# Patient Record
Sex: FEMALE | Race: WHITE | NOT HISPANIC OR LATINO | Employment: PART TIME | URBAN - METROPOLITAN AREA
[De-identification: names, ages, dates, MRNs, and addresses within clinical notes are randomized per-mention and may not be internally consistent; named-entity substitution may affect disease eponyms.]

---

## 2017-01-11 ENCOUNTER — ALLIED HEALTH/NURSE VISIT (OUTPATIENT)
Dept: NURSING | Facility: CLINIC | Age: 27
End: 2017-01-11
Payer: COMMERCIAL

## 2017-01-11 VITALS — HEART RATE: 72 BPM | HEIGHT: 67 IN | SYSTOLIC BLOOD PRESSURE: 114 MMHG | DIASTOLIC BLOOD PRESSURE: 68 MMHG

## 2017-01-11 DIAGNOSIS — M54.50 ACUTE MIDLINE LOW BACK PAIN WITHOUT SCIATICA: Primary | ICD-10-CM

## 2017-01-11 DIAGNOSIS — G89.4 CHRONIC PAIN SYNDROME: ICD-10-CM

## 2017-01-11 DIAGNOSIS — Z30.9 CONTRACEPTIVE MANAGEMENT: Primary | ICD-10-CM

## 2017-01-11 LAB — HCG, QUAL URINE: NEGATIVE

## 2017-01-11 PROCEDURE — 96372 THER/PROPH/DIAG INJ SC/IM: CPT

## 2017-01-11 PROCEDURE — 84703 CHORIONIC GONADOTROPIN ASSAY: CPT

## 2017-01-11 NOTE — PROGRESS NOTES
Follow Up Injection    Patient returning during stated date range given at previous visit: No, urine pregnancy test performed, results (neg - injection administered, positive - injection deferred)      If here at the correct interval:   BP Readings from Last 1 Encounters:   01/11/17 114/68     Wt Readings from Last 1 Encounters:   11/08/16 259 lb (117.482 kg)       Last Pap/exam date: 8/2014      Side effects or problems with last injection?  No.  Date range is given to patient for next dose: 3/29/17-4/12/17      See Medication Note for administration information    Staff Sig: Angela Cao MA

## 2017-01-11 NOTE — TELEPHONE ENCOUNTER
Reason for Call:  Medication or medication refill:    Do you use a [facility]?  Name of the pharmacy and phone number for the current request:  PT  [facility]    Name of the medication requested: oxyCODONE-acetaminophen (PERCOCET) 5-325 MG per tablet    Other request: none    Can we leave a detailed message on this number? YES    Phone number patient can be reached at: Home number on file [phone] (home)    Best Time: any    Call taken on 1/11/2017 at 2:13 PM by PAULA CHO

## 2017-01-12 NOTE — TELEPHONE ENCOUNTER
Controlled Substance Refill Request for oxyCODONE-acetaminophen (PERCOCET) 5-325 MG per tablet  Problem List Complete:  No     PROVIDER TO CONSIDER COMPLETION OF PROBLEM LIST AND OVERVIEW/CONTROLLED SUBSTANCE AGREEMENT    Last Written Prescription Date:  12/22/2016  Last Fill Quantity: 60,   # refills: 0    Last Office Visit with [facility] [place]: 11/08/2016    Future Office visit:     Controlled substance agreement on file: Yes:  Date 10/10/2016.     Processing:  Patient will  in clinic   checked in past 6 months?  Yes 08/30/2016

## 2017-01-19 RX ORDER — OXYCODONE AND ACETAMINOPHEN 5; 325 MG/1; MG/1
TABLET ORAL
Qty: 60 TABLET | Refills: 0 | OUTPATIENT
Start: 2017-01-19

## 2017-02-07 ENCOUNTER — OFFICE VISIT (OUTPATIENT)
Dept: FAMILY MEDICINE | Facility: CLINIC | Age: 27
End: 2017-02-07
Payer: COMMERCIAL

## 2017-02-07 VITALS
OXYGEN SATURATION: 98 % | DIASTOLIC BLOOD PRESSURE: 80 MMHG | BODY MASS INDEX: 40.09 KG/M2 | TEMPERATURE: 97.9 F | WEIGHT: 256 LBS | SYSTOLIC BLOOD PRESSURE: 120 MMHG | RESPIRATION RATE: 16 BRPM | HEART RATE: 91 BPM

## 2017-02-07 DIAGNOSIS — G89.4 CHRONIC PAIN SYNDROME: ICD-10-CM

## 2017-02-07 DIAGNOSIS — G89.29 CHRONIC MIDLINE LOW BACK PAIN WITHOUT SCIATICA: Primary | ICD-10-CM

## 2017-02-07 DIAGNOSIS — M54.50 ACUTE MIDLINE LOW BACK PAIN WITHOUT SCIATICA: ICD-10-CM

## 2017-02-07 DIAGNOSIS — M54.50 CHRONIC MIDLINE LOW BACK PAIN WITHOUT SCIATICA: Primary | ICD-10-CM

## 2017-02-07 PROCEDURE — 80307 DRUG TEST PRSMV CHEM ANLYZR: CPT | Mod: 90 | Performed by: FAMILY MEDICINE

## 2017-02-07 PROCEDURE — 99213 OFFICE O/P EST LOW 20 MIN: CPT | Performed by: FAMILY MEDICINE

## 2017-02-07 PROCEDURE — 99000 SPECIMEN HANDLING OFFICE-LAB: CPT | Performed by: FAMILY MEDICINE

## 2017-02-07 RX ORDER — OXYCODONE AND ACETAMINOPHEN 5; 325 MG/1; MG/1
TABLET ORAL
Qty: 60 TABLET | Refills: 0 | Status: SHIPPED | OUTPATIENT
Start: 2017-02-07 | End: 2017-02-22

## 2017-02-07 NOTE — PROGRESS NOTES
SUBJECTIVE:                                                    Erica Gordon is a 26 year old female who presents to clinic today for the following health issues:    Health Maintenance Due   Topic Date Due     URINE DRUG SCREEN Q1 YR  08/09/2003     INFLUENZA VACCINE (SYSTEM ASSIGNED)  09/01/2016     ASTHMA CONTROL TEST Q6 MOS (NO INBASKET)  01/14/2017     Health Maintenance reviewed at today's visit patient asked to schedule/complete:   Asthma:  Completed at today's visit.  chronic pain        Medication Followup of pain meds    Taking Medication as prescribed: yes    Side Effects:  None    Medication Helping Symptoms:  Yes    Pt has an appointment in April at pain clinic.  This was the earliest she could get in.       PROBLEMS TO ADD ON...    Problem list and histories reviewed & adjusted, as indicated.  Additional history: as documented  She is here with her beautiful baby girl, almost age 1.    Cannot   Get into pain clinic until April. She  Was in pt and does the exercises. Is now working part time at holiday. Is supposed to be 28 hours a week, but some times will work 50 hours a week.      Patient Active Problem List   Diagnosis     Fibromyalgia     Depression     CARDIOVASCULAR SCREENING; LDL GOAL LESS THAN 160     Low back pain     Obesity     GERD (gastroesophageal reflux disease)     Health Care Home     Mild persistent asthma     Encounter for routine gynecological examination     Back pain     Blood type A+     Encounter for triage in pregnant patient     Labor and delivery indication for care or intervention     Chronic pain syndrome     Past Surgical History   Procedure Laterality Date     Pe tubes  1993       Social History   Substance Use Topics     Smoking status: Current Every Day Smoker -- 0.25 packs/day for 4 years     Types: Cigarettes     Smokeless tobacco: Never Used      Comment: 1/2 ppd for 13 years     Alcohol Use: No      Comment: occasionally     Family History   Problem Relation Age  of Onset     Alcohol/Drug Mother      Depression Mother      Hypertension Mother      Depression Maternal Grandmother      Respiratory Other      Mat. cousin- cystic fibrosis     Unknown/Adopted Father      Unknown/Adopted Paternal Grandmother      Unknown/Adopted Paternal Grandfather            ROS:  CONSTITUTIONAL:NEGATIVE for fever, chills, change in weight  RESP:NEGATIVE for significant cough or SOB  CV: NEGATIVE for chest pain, palpitations or peripheral edema  MUSCULOSKELETAL: back pain; medicine does ot see mto relieve plain completleyl. Discussed stretches and rom exercises.   NEURO: NEGATIVE for weakness, dizziness or paresthesias    OBJECTIVE:                                                    /80 mmHg  Pulse 91  Temp(Src) 97.9  F (36.6  C)  Resp 16  Wt 256 lb (116.121 kg)  SpO2 98%  Body mass index is 40.09 kg/(m^2).  GENERAL APPEARANCE: healthy, alert and moderate distress  EYES: Eyes grossly normal to inspection, PERRL and conjunctivae and sclerae normal  MS: extremities normal- no gross deformities noted and decreased range of motion of lower back.   SKIN: no suspicious lesions or rashes  Psych-    Dysphoric.     Diagnostic test results:  Diagnostic Test Results:  Do the urine drug test.      ASSESSMENT/PLAN:                                                    1. Chronic midline low back pain without sciatica    - Drug Screen Comprehensive , Urine with Reported Meds (Pain Care Package)    2. Acute midline low back pain without sciatica    - oxyCODONE-acetaminophen (PERCOCET) 5-325 MG per tablet; one tab three times a day.  Dispense: 60 tablet; Refill: 0    3. Chronic pain syndrome    - oxyCODONE-acetaminophen (PERCOCET) 5-325 MG per tablet; one tab three times a day.  Dispense: 60 tablet; Refill: 0      discussed doing stretches before and after work. Discussed get urine test today congratualated on her child.   Follow up with Provider - 2-4 weeks do stretches.      Omar Izaguirre,  MD  [facility]

## 2017-02-07 NOTE — NURSING NOTE
"Chief Complaint   Patient presents with     Recheck Medication       Initial /80 mmHg  Pulse 91  Temp(Src) 97.9  F (36.6  C)  Resp 16  Wt 256 lb (116.121 kg)  SpO2 98% Estimated body mass index is 40.09 kg/(m^2) as calculated from the following:    Height as of 1/11/17: 5' 7\" (1.702 m).    Weight as of this encounter: 256 lb (116.121 kg).  Medication Reconciliation: complete   NISHA Cabrera CMA      "

## 2017-02-07 NOTE — MR AVS SNAPSHOT
"              After Visit Summary   2017    Erica Gordon    MRN: [id number]           Patient Information     Date Of Birth          1990        Visit Information        Provider Department      2017 11:15 AM Omar Izaguirre MD [facility]        Today's Diagnoses     Chronic midline low back pain without sciatica    -  1     Acute midline low back pain without sciatica         Chronic pain syndrome            Follow-ups after your visit        Who to contact     If you have questions or need follow up information about today's clinic visit or your schedule please contact [facility] directly at [phone].  Normal or non-critical lab and imaging results will be communicated to you by MyChart, letter or phone within 4 business days after the clinic has received the results. If you do not hear from us within 7 days, please contact the clinic through [facility] or phone. If you have a critical or abnormal lab result, we will notify you by phone as soon as possible.  Submit refill requests through [employer] or call your pharmacy and they will forward the refill request to us. Please allow 3 business days for your refill to be completed.          Additional Information About Your Visit        MyChart Information     [employer] lets you send messages to your doctor, view your test results, renew your prescriptions, schedule appointments and more. To sign up, go to [email or web address] . Click on \"Log in\" on the left side of the screen, which will take you to the Welcome page. Then click on \"Sign up Now\" on the right side of the page.     You will be asked to enter the access code listed below, as well as some personal information. Please follow the directions to create your username and password.     Your access code is: [email or web address]  Expires: 2017  4:58 PM     Your access code will  in 90 days. If you need help or a new " code, please call your [facility] clinic or [phone].        Care EveryWhere ID     This is your Care EveryWhere ID. This could be used by other organizations to access your [facility] medical records  [id number]        Your Vitals Were     Pulse Temperature Respirations Pulse Oximetry          91 97.9  F (36.6  C) 16 98%         Blood Pressure from Last 3 Encounters:   02/07/17 120/80   01/11/17 114/68   11/08/16 120/70    Weight from Last 3 Encounters:   02/07/17 256 lb (116.121 kg)   11/08/16 259 lb (117.482 kg)   10/10/16 265 lb (120.203 kg)              We Performed the Following     Drug Screen Comprehensive , Urine with Reported Meds (Pain Care Package)          Where to get your medicines      Some of these will need a paper prescription and others can be bought over the counter.  Ask your nurse if you have questions.     Bring a paper prescription for each of these medications    - oxyCODONE-acetaminophen 5-325 MG per tablet       Primary Care Provider Office Phone # Fax #    Omar Izaguirre [phone]       [facility] [address] [facility][1] [id number]        Thank you!     Thank you for choosing [facility]  for your care. Our goal is always to provide you with excellent care. Hearing back from our patients is one way we can continue to improve our services. Please take a few minutes to complete the written survey that you may receive in the mail after your visit with us. Thank you!             Your Updated Medication List - Protect others around you: Learn how to safely use, store and throw away your medicines at www.disposemymeds.org.          This list is accurate as of: 2/7/17  4:58 PM.  Always use your most recent med list.                   Brand Name Dispense Instructions for use    albuterol 108 (90 BASE) MCG/ACT Inhaler    PROAIR HFA/PROVENTIL HFA/VENTOLIN HFA    1 Inhaler    Inhale 2 puffs into the lungs 4 times daily  as needed for shortness of breath / dyspnea       DULoxetine 20 MG EC capsule    CYMBALTA    60 capsule    Take 1 capsule (20 mg) by mouth 2 times daily       escitalopram 10 MG tablet    LEXAPRO    30 tablet    Take 1 tablet (10 mg) by mouth daily       ibuprofen 600 MG tablet    ADVIL/MOTRIN    20 tablet    Take 1 tablet (600 mg) by mouth every 6 hours as needed for moderate pain       medroxyPROGESTERone 150 MG/ML injection    DEPO-PROVERA    1 mL    Inject 1 mL (150 mg) into the muscle every 3 months       oxyCODONE-acetaminophen 5-325 MG per tablet    PERCOCET    60 tablet    one tab three times a day.       tiZANidine 2 MG tablet    ZANAFLEX    20 tablet    Take 1 tablet (2 mg) by mouth 3 times daily as needed for muscle spasms       TYLENOL PO      Take 1,000 mg by mouth as needed for mild pain or fever

## 2017-02-08 ASSESSMENT — ASTHMA QUESTIONNAIRES: ACT_TOTALSCORE: 20

## 2017-02-08 ASSESSMENT — PATIENT HEALTH QUESTIONNAIRE - PHQ9: SUM OF ALL RESPONSES TO PHQ QUESTIONS 1-9: 9

## 2017-02-09 LAB — PAIN DRUG SCR UR W RPTD MEDS: NORMAL

## 2017-02-22 DIAGNOSIS — G89.4 CHRONIC PAIN SYNDROME: ICD-10-CM

## 2017-02-22 DIAGNOSIS — M54.50 ACUTE MIDLINE LOW BACK PAIN WITHOUT SCIATICA: ICD-10-CM

## 2017-02-22 NOTE — TELEPHONE ENCOUNTER
oxyCODONE-acetaminophen (PERCOCET) 5-325 MG per tablet  CSA on file dated 10/10/16    Last  2/22/2016    Problem List Complete:  No     PROVIDER TO CONSIDER COMPLETION OF PROBLEM LIST AND OVERVIEW/CONTROLLED SUBSTANCE AGREEMENT    Last Written Prescription Date:  2/7/2017  Last Fill Quantity: 60,   # refills: 0    [place] Visit [place] [facility] [place]: 2/7/2017    Future Office visit:     Controlled substance agreement on file: Yes:  Date 10/10/2016.     Processing:  Patient will  in clinic  [phone]- Can  at either clinic   checked in past 6 months?  Yes 2/22/2017

## 2017-02-22 NOTE — TELEPHONE ENCOUNTER
Reason for Call:  Medication or medication refill:    Do you use a [facility]?  Name of the pharmacy and phone number for the current request:   either site.  Please specify to pt when u call her.     Name of the medication requested: Oxycodone    Other request: na    Can we leave a detailed message on this number? YES    Phone number patient can be reached at: Home number on file [phone] (home)    Best Time: any    Call taken on 2/22/2017 at 11:42 AM by PAULA CHO

## 2017-02-23 RX ORDER — OXYCODONE AND ACETAMINOPHEN 5; 325 MG/1; MG/1
TABLET ORAL
Qty: 20 TABLET | Refills: 0 | Status: SHIPPED | OUTPATIENT
Start: 2017-02-23 | End: 2017-03-14

## 2017-02-23 NOTE — TELEPHONE ENCOUNTER
Patient is at clinic now for Percocet Rx. Reports she will be out of town and needs short supply at least 15-20 tablets and will schedule appt with PCP. Please advice.

## 2017-02-27 NOTE — TELEPHONE ENCOUNTER
Prolonged recovery on back pain; needs to be seen. Has been referred to pain clinic.     Omar Izaguirre MD

## 2017-03-14 ENCOUNTER — OFFICE VISIT (OUTPATIENT)
Dept: FAMILY MEDICINE | Facility: CLINIC | Age: 27
End: 2017-03-14
Payer: COMMERCIAL

## 2017-03-14 VITALS
TEMPERATURE: 98.3 F | DIASTOLIC BLOOD PRESSURE: 74 MMHG | WEIGHT: 260 LBS | BODY MASS INDEX: 40.72 KG/M2 | SYSTOLIC BLOOD PRESSURE: 120 MMHG | OXYGEN SATURATION: 99 % | RESPIRATION RATE: 18 BRPM | HEART RATE: 96 BPM

## 2017-03-14 DIAGNOSIS — M54.50 ACUTE MIDLINE LOW BACK PAIN WITHOUT SCIATICA: ICD-10-CM

## 2017-03-14 DIAGNOSIS — G89.4 CHRONIC PAIN SYNDROME: ICD-10-CM

## 2017-03-14 PROCEDURE — 99213 OFFICE O/P EST LOW 20 MIN: CPT | Performed by: FAMILY MEDICINE

## 2017-03-14 RX ORDER — OXYCODONE AND ACETAMINOPHEN 5; 325 MG/1; MG/1
TABLET ORAL
Qty: 35 TABLET | Refills: 0 | Status: SHIPPED | OUTPATIENT
Start: 2017-03-14 | End: 2018-01-08

## 2017-03-14 NOTE — LETTER
My Depression Action Plan  Name: Erica Gordon   Date of Birth 1990  Date: 3/14/2017    My doctor: Omar Izaguirre   My clinic: [address]  [facility] [address] [phone]          GREEN    ZONE   Good Control    What it looks like:     Things are going generally well. You have normal up s and down s. You may even feel depressed from time to time, but bad moods usually last less than a day.   What you need to do:  1. Continue to care for yourself (see self care plan)  2. Check your depression survival kit and update it as needed  3. Follow your physician s recommendations including any medication.  4. Do not stop taking medication unless you consult with your physician first.           YELLOW         ZONE Getting Worse    What it looks like:     Depression is starting to interfere with your life.     It may be hard to get out of bed; you may be starting to isolate yourself from others.    Symptoms of depression are starting to last most all day and this has happened for several days.     You may have suicidal thoughts but they are not constant.   What you need to do:     1. Call your care team, your response to treatment will improve if you keep your care team informed of your progress. Yellow periods are signs an adjustment may need to be made.     2. Continue your self-care, even if you have to fake it!    3. Talk to someone in your support network    4. Open up your depression survival kit           RED    ZONE Medical Alert - Get Help    What it looks like:     Depression is seriously interfering with your life.     You may experience these or other symptoms: You can t get out of bed most days, can t work or engage in other necessary activities, you have trouble taking care of basic hygiene, or basic responsibilities, thoughts of suicide or death that will not go away, self-injurious behavior.     What you need  to do:  1. Call your care team and request a same-day appointment. If they are not available (weekends or after hours) call your local crisis line, emergency room or 911.      Electronically signed by: Imelda Cabrera, March 14, 2017    Depression Self Care Plan / Survival Kit    Self-Care for Depression  Here s the deal. Your body and mind are really not as separate as most people think.  What you do and think affects how you feel and how you feel influences what you do and think. This means if you do things that people who feel good do, it will help you feel better.  Sometimes this is all it takes.  There is also a place for medication and therapy depending on how severe your depression is, so be sure to consult with your medical provider and/ or Behavioral Health Consultant if your symptoms are worsening or not improving.     In order to better manage my stress, I will:    Exercise  Get some form of exercise, every day. This will help reduce pain and release endorphins, the  feel good  chemicals in your brain. This is almost as good as taking antidepressants!  This is not the same as joining a gym and then never going! (they count on that by the way ) It can be as simple as just going for a walk or doing some gardening, anything that will get you moving.      Hygiene   Maintain good hygiene (Get out of bed in the morning, Make your bed, Brush your teeth, Take a shower, and Get dressed like you were going to work, even if you are unemployed).  If your clothes don't fit try to get ones that do.    Diet  I will strive to eat foods that are good for me, drink plenty of water, and avoid excessive sugar, caffeine, alcohol, and other mood-altering substances.  Some foods that are helpful in depression are: complex carbohydrates, B vitamins, flaxseed, fish or fish oil, fresh fruits and vegetables.    Psychotherapy  I agree to participate in Individual Therapy (if recommended).    Medication  If prescribed medications, I  agree to take them.  Missing doses can result in serious side effects.  I understand that drinking alcohol, or other illicit drug use, may cause potential side effects.  I will not stop my medication abruptly without first discussing it with my provider.    Staying Connected With Others  I will stay in touch with my friends, family members, and my primary care provider/team.    Use your imagination  Be creative.  We all have a creative side; it doesn t matter if it s oil painting, sand castles, or mud pies! This will also kick up the endorphins.    Witness Beauty  (AKA stop and smell the roses) Take a look outside, even in mid-winter. Notice colors, textures. Watch the squirrels and birds.     Service to others  Be of service to others.  There is always someone else in need.  By helping others we can  get out of ourselves  and remember the really important things.  This also provides opportunities for practicing all the other parts of the program.    Humor  Laugh and be silly!  Adjust your TV habits for less news and crime-drama and more comedy.    Control your stress  Try breathing deep, massage therapy, biofeedback, and meditation. Find time to relax each day.     My support system    Clinic Contact:  Phone number:    Contact 1:  Phone number:    Contact 2:  Phone number:    [place]/:  Phone number:    Therapist:  Phone number:    Local crisis center:    Phone number:    Other community support:  Phone number:

## 2017-03-14 NOTE — LETTER
My Asthma Action Plan  Name: Erica Gordon   YOB: 1990  Date: 3/14/2017   My doctor: Omar Izaguirre MD   My clinic: [facility]        My Control Medicine: { :143136}  My Rescue Medicine: { :654453}  {AAP include Oral Steroid:951241} My Asthma Severity: { :900373}  Avoid your asthma triggers: { :661612}        {Is patient a child or adult?:254017:::0}       GREEN ZONE     Good Control    I feel good    No cough or wheeze    Can work, sleep and play without asthma symptoms       Take your asthma control medicine every day.     1. If exercise triggers your asthma, take your rescue medication    15 minutes before exercise or sports, and    During exercise if you have asthma symptoms  2. Spacer to use with inhaler: If you have a spacer, make sure to use it with your inhaler             YELLOW ZONE     Getting Worse  I have ANY of these:    I do not feel good    Cough or wheeze    Chest feels tight    Wake up at night   1. Keep taking your Green Zone medications  2. Start taking your rescue medicine:    every 20 minutes for up to 1 hour. Then every 4 hours for 24-48 hours.  3. If you stay in the Yellow Zone for more than 12-24 hours, contact your doctor.  4. If you do not return to the Green Zone in 12-24 hours or you get worse, start taking your oral steroid medicine if prescribed by your provider.           RED ZONE     Medical Alert - Get Help  I have ANY of these:    I feel awful    Medicine is not helping    Breathing getting harder    Trouble walking or talking    Nose opens wide to breathe       1. Take your rescue medicine NOW  2. If your provider has prescribed an oral steroid medicine, start taking it NOW  3. Call your doctor NOW  4. If you are still in the Red Zone after 20 minutes and you have not reached your doctor:    Take your rescue medicine again and    Call 911 or go to the emergency room right away    See your regular doctor within 2 weeks of  an Emergency Room or Urgent Care visit for follow-up treatment.        Electronically signed by: Imelda Cabrera, March 14, 2017    Annual Reminders:  Meet with Asthma Educator,  Flu Shot in the Fall, consider Pneumonia Vaccination for patients with asthma (aged 19 and older).    Pharmacy: [facility] [address] [place] [address] [id number] AT [facility] [address]                    Asthma Triggers  How To Control Things That Make Your Asthma Worse    Triggers are things that make your asthma worse.  Look at the list below to help you find your triggers and what you can do about them.  You can help prevent asthma flare-ups by staying away from your triggers.      Trigger                                                          What you can do   Cigarette Smoke  Tobacco smoke can make asthma worse. Do not allow smoking in your home, car or around you.  Be sure no one smokes at a child s day care or school.  If you smoke, ask your health care provider for ways to help you quit.  Ask family members to quit too.  Ask your health care provider for a referral to Quit Plan to help you quit smoking, or call 4-006-233-PLAN.     Colds, Flu, Bronchitis  These are common triggers of asthma. Wash your hands often.  Don t touch your eyes, nose or mouth.  Get a flu shot every year.     Dust Mites  These are tiny bugs that live in cloth or carpet. They are too small to see. Wash sheets and blankets in hot water every week.   Encase pillows and mattress in dust mite proof covers.  Avoid having carpet if you can. If you have carpet, vacuum weekly.   Use a dust mask and HEPA vacuum.   Pollen and Outdoor Mold  Some people are allergic to trees, grass, or weed pollen, or molds. Try to keep your windows closed.  Limit time out doors when pollen count is high.   Ask you health care provider about taking medicine during allergy season.     Animal Dander  Some people are allergic to skin flakes, urine or saliva from pets with fur  or feathers. Keep pets with fur or feathers out of your home.    If you can t keep the pet outdoors, then keep the pet out of your bedroom.  Keep the bedroom door closed.  Keep pets off cloth furniture and away from stuffed toys.     Mice, Rats, and Cockroaches  Some people are allergic to the waste from these pests.   Cover food and garbage.  Clean up spills and food crumbs.  Store grease in the refrigerator.   Keep food out of the bedroom.   Indoor Mold  This can be a trigger if your home has high moisture. Fix leaking faucets, pipes, or other sources of water.   Clean moldy surfaces.  Dehumidify basement if it is damp and smelly.   Smoke, Strong Odors, and Sprays  These can reduce air quality. Stay away from strong odors and sprays, such as perfume, powder, hair spray, paints, smoke incense, paint, cleaning products, candles and new carpet.   Exercise or Sports  Some people with asthma have this trigger. Be active!  Ask your doctor about taking medicine before sports or exercise to prevent symptoms.    Warm up for 5-10 minutes before and after sports or exercise.     Other Triggers of Asthma  Cold air:  Cover your nose and mouth with a scarf.  Sometimes laughing or crying can be a trigger.  Some medicines and food can trigger asthma.

## 2017-03-14 NOTE — PROGRESS NOTES
SUBJECTIVE:                                                    Erica Gordon is a 26 year old female who presents to clinic today for the following health issues:    Health Maintenance Due   Topic Date Due     ASTHMA ACTION PLAN Q1 YR (NO INBASKET)  04/14/2017     DEPRESSION ACTION PLAN Q1 YR (NO INBASKET)  04/14/2017     Health Maintenance reviewed at today's visit patient asked to schedule/complete:   Asthma:  Completed at today's visit.  Depression:  Completed at today's visit.        Medication Followup of pain meds    Taking Medication as prescribed: yes    Side Effects:  None    Medication Helping Symptoms:  Yes    Pt was referred to pain clinic but hasnt made an appointment yet       PROBLEMS TO ADD ON...    Problem list and histories reviewed & adjusted, as indicated.  Additional history: as documented  folow up of back pain has not yet been seen in pain lcinic states does have an appointment. Is working at [employer] and states this is going well. Not missing work with the back pain.     Patient Active Problem List   Diagnosis     Fibromyalgia     Depression     CARDIOVASCULAR SCREENING; LDL GOAL LESS THAN 160     Low back pain     Obesity     GERD (gastroesophageal reflux disease)     Health Care Home     Mild persistent asthma     Encounter for routine gynecological examination     Back pain     Blood type A+     Encounter for triage in pregnant patient     Labor and delivery indication for care or intervention     Chronic pain syndrome     Past Surgical History   Procedure Laterality Date     Pe tubes  1993       Social History   Substance Use Topics     Smoking status: Current Every Day Smoker     Packs/day: 0.25     Years: 4.00     Types: Cigarettes     Smokeless tobacco: Never Used      Comment: 1/2 ppd for 13 years     Alcohol use No      Comment: occasionally     Family History   Problem Relation Age of Onset     Alcohol/Drug Mother      Depression Mother      Hypertension Mother      Depression  Maternal Grandmother      Respiratory Other      Mat. cousin- cystic fibrosis     Unknown/Adopted Father      Unknown/Adopted Paternal Grandmother      Unknown/Adopted Paternal Grandfather            Reviewed and updated as needed this visit by clinical staff       Reviewed and updated as needed this visit by Provider         ROS:  CONSTITUTIONAL:NEGATIVE for fever, chills, change in weight  INTEGUMENTARY/SKIN: NEGATIVE for worrisome rashes, moles or lesions  MUSCULOSKELETAL: back pain  NEURO: NEGATIVE for weakness, dizziness or paresthesias    OBJECTIVE:                                                    /74  Pulse 96  Temp 98.3  F (36.8  C)  Resp 18  Wt 260 lb (117.9 kg)  SpO2 99%  BMI 40.72 kg/m2  Body mass index is 40.72 kg/(m^2).  GENERAL APPEARANCE: healthy, alert and mild distress  EYES: Eyes grossly normal to inspection, PERRL and conjunctivae and sclerae normal  PSYCH: mentation appears normal and affect normal/bright    Diagnostic test results:  Diagnostic Test Results:  none      ASSESSMENT/PLAN:                                                    1. Acute midline low back pain without sciatica    - oxyCODONE-acetaminophen (PERCOCET) 5-325 MG per tablet; one tab three times a day.  Dispense: 35 tablet; Refill: 0    2. Chronic pain syndrome    - oxyCODONE-acetaminophen (PERCOCET) 5-325 MG per tablet; one tab three times a day.  Dispense: 35 tablet; Refill: 0    Encouraged to do rang eof motin iexercises inlcuding left and right tilting. And twist at the waist.   Follow up with Provider - 2-6 weeks also discussed check to move up appointment at the pain clinic.      Omar Izaguirre MD  [facility] [place]

## 2017-03-14 NOTE — MR AVS SNAPSHOT
"              After Visit Summary   3/14/2017    Erica Gordon    MRN: [id number]           Patient Information     Date Of Birth          1990        Visit Information        Provider Department      3/14/2017 9:15 AM Omar Izaguirre MD [facility]        Today's Diagnoses     Acute midline low back pain without sciatica        Chronic pain syndrome           Follow-ups after your visit        Who to contact     If you have questions or need follow up information about today's clinic visit or your schedule please contact [facility] directly at [phone].  Normal or non-critical lab and imaging results will be communicated to you by [employer], letter or phone within 4 business days after the clinic has received the results. If you do not hear from us within 7 days, please contact the clinic through [employer] or phone. If you have a critical or abnormal lab result, we will notify you by phone as soon as possible.  Submit refill requests through [employer] or call your pharmacy and they will forward the refill request to us. Please allow 3 business days for your refill to be completed.          Additional Information About Your Visit        MyChart Information     [employer] lets you send messages to your doctor, view your test results, renew your prescriptions, schedule appointments and more. To sign up, go to [email or web address] . Click on \"Log in\" on the left side of the screen, which will take you to the Welcome page. Then click on \"Sign up Now\" on the right side of the page.     You will be asked to enter the access code listed below, as well as some personal information. Please follow the directions to create your username and password.     Your access code is: [email or web address]  Expires: 2017  5:58 PM     Your access code will  in 90 days. If you need help or a new code, please call your [facility] or [phone].      "   Care EveryWhere ID     This is your Care EveryWhere ID. This could be used by other organizations to access your [place] medical records  [id number]        Your Vitals Were     Pulse Temperature Respirations Pulse Oximetry BMI (Body Mass Index)       96 98.3  F (36.8  C) 18 99% 40.72 kg/m2        Blood Pressure from Last 3 Encounters:   03/14/17 120/74   02/07/17 120/80   01/11/17 114/68    Weight from Last 3 Encounters:   03/14/17 260 lb (117.9 kg)   02/07/17 256 lb (116.1 kg)   11/08/16 259 lb (117.5 kg)              We Performed the Following     Asthma Action Plan (AAP)     DEPRESSION ACTION PLAN (DAP)          Where to get your medicines      Some of these will need a paper prescription and others can be bought over the counter.  Ask your nurse if you have questions.     Bring a paper prescription for each of these medications     oxyCODONE-acetaminophen 5-325 MG per tablet          Primary Care Provider Office Phone # Fax #    Omar Izaguirre [phone]       [facility] [address] [address] [facility] [address]        Thank you!     Thank you for choosing [facility]  for your care. Our goal is always to provide you with excellent care. Hearing back from our patients is one way we can continue to improve our services. Please take a few minutes to complete the written survey that you may receive in the mail after your visit with us. Thank you!             Your Updated Medication List - Protect others around you: Learn how to safely use, store and throw away your medicines at www.disposemymeds.org.          This list is accurate as of: 3/14/17 10:03 AM.  Always use your most recent med list.                   Brand Name Dispense Instructions for use    albuterol 108 (90 BASE) MCG/ACT Inhaler    PROAIR HFA/PROVENTIL HFA/VENTOLIN HFA    1 Inhaler    Inhale 2 puffs into the lungs 4 times daily as needed for shortness of breath / dyspnea        DULoxetine 20 MG EC capsule    CYMBALTA    60 capsule    Take 1 capsule (20 mg) by mouth 2 times daily       escitalopram 10 MG tablet    LEXAPRO    30 tablet    Take 1 tablet (10 mg) by mouth daily       ibuprofen 600 MG tablet    ADVIL/MOTRIN    20 tablet    Take 1 tablet (600 mg) by mouth every 6 hours as needed for moderate pain       medroxyPROGESTERone 150 MG/ML injection    DEPO-PROVERA    1 mL    Inject 1 mL (150 mg) into the muscle every 3 months       oxyCODONE-acetaminophen 5-325 MG per tablet    PERCOCET    35 tablet    one tab three times a day.       tiZANidine 2 MG tablet    ZANAFLEX    20 tablet    Take 1 tablet (2 mg) by mouth 3 times daily as needed for muscle spasms       TYLENOL PO      Take 1,000 mg by mouth as needed for mild pain or fever

## 2017-03-14 NOTE — NURSING NOTE
"Chief Complaint   Patient presents with     Recheck Medication       Initial /74  Pulse 96  Temp 98.3  F (36.8  C)  Resp 18  Wt 260 lb (117.9 kg)  SpO2 99%  BMI 40.72 kg/m2 Estimated body mass index is 40.72 kg/(m^2) as calculated from the following:    Height as of 1/11/17: 5' 7\" (1.702 m).    Weight as of this encounter: 260 lb (117.9 kg).  Medication Reconciliation: johnson Cabrera CMA      "

## 2017-03-15 ASSESSMENT — PATIENT HEALTH QUESTIONNAIRE - PHQ9: SUM OF ALL RESPONSES TO PHQ QUESTIONS 1-9: 10

## 2017-04-11 ENCOUNTER — ALLIED HEALTH/NURSE VISIT (OUTPATIENT)
Dept: NURSING | Facility: CLINIC | Age: 27
End: 2017-04-11
Payer: COMMERCIAL

## 2017-04-11 VITALS — HEART RATE: 80 BPM | BODY MASS INDEX: 39.7 KG/M2 | WEIGHT: 253.5 LBS

## 2017-04-11 PROCEDURE — 96372 THER/PROPH/DIAG INJ SC/IM: CPT

## 2017-04-13 DIAGNOSIS — M54.50 ACUTE MIDLINE LOW BACK PAIN WITHOUT SCIATICA: ICD-10-CM

## 2017-04-13 DIAGNOSIS — G89.4 CHRONIC PAIN SYNDROME: ICD-10-CM

## 2017-04-13 RX ORDER — OXYCODONE AND ACETAMINOPHEN 5; 325 MG/1; MG/1
TABLET ORAL
Qty: 35 TABLET | Refills: 0 | OUTPATIENT
Start: 2017-04-13

## 2017-04-13 NOTE — TELEPHONE ENCOUNTER
Controlled Substance Refill Request for oxyCODONE-acetaminophen (PERCOCET) 5-325 MG per tablet  Problem List Complete:  No     PROVIDER TO CONSIDER COMPLETION OF PROBLEM LIST AND OVERVIEW/CONTROLLED SUBSTANCE AGREEMENT    Last Written Prescription Date:  03/14/2017  Last Fill Quantity: 35,   # refills: 0    Last Office Visit with [facility] care provider: 02/07/2017    Future Office visit:     Controlled substance agreement on file: No.     Processing:  Patient will  in clinic   checked in past 6 months?  Yes 04/13/2017   [place] reviewed:  reviewed- no concerns    [place]  [email or web address]

## 2017-04-13 NOTE — TELEPHONE ENCOUNTER
Reason for Call:  Medication or medication refill:    Do you use a [facility]?  Name of the pharmacy and phone number for the current request:  [facility]     Name of the medication requested: Percocet 5-325 mg    Other request: Please call when ready.  [place]    Can we leave a detailed message on this number? YES    Phone number patient can be reached at: Home number on file [phone] (home)    Best Time: Any    Call taken on 4/13/2017 at 9:50 AM by KENDRA OCHOA

## 2017-06-26 ENCOUNTER — ALLIED HEALTH/NURSE VISIT (OUTPATIENT)
Dept: NURSING | Facility: CLINIC | Age: 27
End: 2017-06-26
Payer: COMMERCIAL

## 2017-06-26 VITALS
SYSTOLIC BLOOD PRESSURE: 118 MMHG | BODY MASS INDEX: 38.37 KG/M2 | HEART RATE: 86 BPM | DIASTOLIC BLOOD PRESSURE: 68 MMHG | WEIGHT: 245 LBS | OXYGEN SATURATION: 98 %

## 2017-06-26 PROCEDURE — 96372 THER/PROPH/DIAG INJ SC/IM: CPT

## 2017-06-26 NOTE — NURSING NOTE
"Chief Complaint   Patient presents with     Imm/Inj       Initial /68  Pulse 86  Wt 245 lb (111.1 kg)  SpO2 98%  BMI 38.37 kg/m2 Estimated body mass index is 38.37 kg/(m^2) as calculated from the following:    Height as of 17: 5' 7\" (1.702 m).    Weight as of this encounter: 245 lb (111.1 kg).  BP completed using cuff size: regular        The following HM Due: NONE      The following patient reported/Care Every where data was sent to:  P ABSTRACT QUALITY INITIATIVES [27801]       Next inj due     Luh Fay CMA                "

## 2017-06-26 NOTE — MR AVS SNAPSHOT
"              After Visit Summary   2017    Erica Gordon    MRN: [id number]           Patient Information     Date Of Birth          1990        Visit Information        Provider Department      2017 9:45 AM [facility]        Today's Diagnoses     Contraception    -  1       Follow-ups after your visit        Who to contact     If you have questions or need follow up information about today's clinic visit or your schedule please contact [facility] directly at [phone].  Normal or non-critical lab and imaging results will be communicated to you by [employer], letter or phone within 4 business days after the clinic has received the results. If you do not hear from us within 7 days, please contact the clinic through [employer] or phone. If you have a critical or abnormal lab result, we will notify you by phone as soon as possible.  Submit refill requests through [employer] or call your pharmacy and they will forward the refill request to us. Please allow 3 business days for your refill to be completed.          Additional Information About Your Visit        MyChart Information     [employer] lets you send messages to your doctor, view your test results, renew your prescriptions, schedule appointments and more. To sign up, go to [email or web address] . Click on \"Log in\" on the left side of the screen, which will take you to the Welcome page. Then click on \"Sign up Now\" on the right side of the page.     You will be asked to enter the access code listed below, as well as some personal information. Please follow the directions to create your username and password.     Your access code is: [email or web address]  Expires: 2017  1:27 PM     Your access code will  in 90 days. If you need help or a new code, please call your [facility] or [phone].        Care EveryWhere ID     This is your Care EveryWhere ID. This could be used by other " organizations to access your [place] medical records  [id number]        Your Vitals Were     Pulse Pulse Oximetry BMI (Body Mass Index)             86 98% 38.37 kg/m2          Blood Pressure from Last 3 Encounters:   06/26/17 118/68   03/14/17 120/74   02/07/17 120/80    Weight from Last 3 Encounters:   06/26/17 245 lb (111.1 kg)   04/11/17 253 lb 8 oz (115 kg)   03/14/17 260 lb (117.9 kg)              We Performed the Following     THER/PROPH/DIAG INJ, SC/IM        Primary Care Provider Office Phone # Fax #    Omar Izaguirre [phone]       [facility] [address]  [facility] [address]        Equal Access to Services     CYRUS ZAMORA : Hadii aad ku hadasho Soomaali, waaxda luqadaha, qaybta kaalmada adeegyada, waxay idiin hayaan josemanuel kharajeffrey gonzales . So [facility] [phone].    ATENCIÓN: Si habla español, tiene a bassett disposición servicios gratuitos de asistencia lingüística. Llame al [phone].    We comply with applicable federal civil rights laws and [place] laws. We do not discriminate on the basis of race, color, national origin, age, disability sex, sexual orientation or gender identity.            Thank you!     Thank you for choosing [facility]  for your care. Our goal is always to provide you with excellent care. Hearing back from our patients is one way we can continue to improve our services. Please take a few minutes to complete the written survey that you may receive in the mail after your visit with us. Thank you!             Your Updated Medication List - Protect others around you: Learn how to safely use, store and throw away your medicines at www.disposemymeds.org.          This list is accurate as of: 6/26/17  1:27 PM.  Always use your most recent med list.                   Brand Name Dispense Instructions for use Diagnosis    albuterol 108 (90 BASE) MCG/ACT Inhaler    PROAIR HFA/PROVENTIL HFA/VENTOLIN HFA    1 Inhaler     Inhale 2 puffs into the lungs 4 times daily as needed for shortness of breath / dyspnea    Intermittent asthma, uncomplicated       DULoxetine 20 MG EC capsule    CYMBALTA    60 capsule    Take 1 capsule (20 mg) by mouth 2 times daily    PTSD (post-traumatic stress disorder)       escitalopram 10 MG tablet    LEXAPRO    30 tablet    Take 1 tablet (10 mg) by mouth daily    Adjustment disorder with depressed mood       ibuprofen 600 MG tablet    ADVIL/MOTRIN    20 tablet    Take 1 tablet (600 mg) by mouth every 6 hours as needed for moderate pain        medroxyPROGESTERone 150 MG/ML injection    DEPO-PROVERA    1 mL    Inject 1 mL (150 mg) into the muscle every 3 months        oxyCODONE-acetaminophen 5-325 MG per tablet    PERCOCET    35 tablet    one tab three times a day.    Acute midline low back pain without sciatica, Chronic pain syndrome       tiZANidine 2 MG tablet    ZANAFLEX    20 tablet    Take 1 tablet (2 mg) by mouth 3 times daily as needed for muscle spasms    Right-sided low back pain without sciatica       TYLENOL PO      Take 1,000 mg by mouth as needed for mild pain or fever

## 2017-06-26 NOTE — PROGRESS NOTES
Follow Up Injection    Patient returning during stated date range given at previous visit: Yes      If here at the correct interval:   BP Readings from Last 1 Encounters:   06/26/17 118/68     Wt Readings from Last 1 Encounters:   06/26/17 245 lb (111.1 kg)       Last Pap/exam date: 8/2014      Side effects or problems with last injection?  No.  Date range is given to patient for next dose: September 11-25    See Medication Note for administration information    Staff Sig: Luh Fay CMA

## 2017-07-10 DIAGNOSIS — J45.20 INTERMITTENT ASTHMA, UNCOMPLICATED: ICD-10-CM

## 2017-07-10 NOTE — TELEPHONE ENCOUNTER
albuterol (PROAIR HFA, PROVENTIL HFA, VENTOLIN HFA) 108 (90 BASE) MCG/ACT inhaler       Last Written Prescription Date: 8/18/16  Last Fill Quantity: 1, # refills: 5    Last Office Visit with G, P or [facility] prescribing provider:  3/14/17   Future Office Visit:       Date of Last Asthma Action Plan Letter:   Asthma Action Plan Q1 Year    Topic Date Due     Asthma Action Plan - yearly  03/14/2018      Asthma Control Test:   ACT Total Scores 2/7/2017   ACT TOTAL SCORE (Goal Greater than or Equal to 20) 20   In the past 12 months, how many times did you visit the emergency room for your asthma without being admitted to the hospital? 0   In the past 12 months, how many times were you hospitalized overnight because of your asthma? 0       Date of Last Spirometry Test:   No results found for this or any previous visit.

## 2017-07-12 RX ORDER — ALBUTEROL SULFATE 90 UG/1
2 AEROSOL, METERED RESPIRATORY (INHALATION) 4 TIMES DAILY PRN
Qty: 1 INHALER | Refills: 2 | Status: SHIPPED | OUTPATIENT
Start: 2017-07-12 | End: 2017-09-21

## 2017-09-21 DIAGNOSIS — J45.20 INTERMITTENT ASTHMA, UNCOMPLICATED: ICD-10-CM

## 2017-09-21 RX ORDER — ALBUTEROL SULFATE 90 UG/1
2 AEROSOL, METERED RESPIRATORY (INHALATION) 4 TIMES DAILY PRN
Qty: 1 INHALER | Refills: 6 | Status: SHIPPED | OUTPATIENT
Start: 2017-09-21 | End: 2018-02-14

## 2017-09-21 NOTE — TELEPHONE ENCOUNTER
albuterol (PROAIR HFA/PROVENTIL HFA/VENTOLIN HFA) 108 (90 BASE) MCG/ACT Inhaler       Last Written Prescription Date: 7/12/17  Last Fill Quantity: 1, # refills: 2    Last Office Visit with G, P or [facility] prescribing provider:  3/14/17   Future Office Visit:    Next 5 appointments (look out 90 days)     Sep 27, 2017  3:30 PM CDT   Nurse Only with [facility] - NURSE   [facility]    [address] [id number] [phone]                   Date of Last Asthma Action Plan Letter:   Asthma Action Plan Q1 Year    Topic Date Due     Asthma Action Plan - yearly  03/14/2018      Asthma Control Test:   ACT Total Scores 2/7/2017   ACT TOTAL SCORE -   ASTHMA ER VISITS -   ASTHMA HOSPITALIZATIONS -   ACT TOTAL SCORE (Goal Greater than or Equal to 20) 20   In the past 12 months, how many times did you visit the emergency room for your asthma without being admitted to the hospital? 0   In the past 12 months, how many times were you hospitalized overnight because of your asthma? 0       Date of Last Spirometry Test:   No results found for this or any previous visit.

## 2017-09-27 ENCOUNTER — ALLIED HEALTH/NURSE VISIT (OUTPATIENT)
Dept: NURSING | Facility: CLINIC | Age: 27
End: 2017-09-27
Payer: COMMERCIAL

## 2017-09-27 VITALS — WEIGHT: 247.5 LBS | SYSTOLIC BLOOD PRESSURE: 120 MMHG | BODY MASS INDEX: 38.76 KG/M2 | DIASTOLIC BLOOD PRESSURE: 88 MMHG

## 2017-09-27 DIAGNOSIS — Z30.9 CONTRACEPTIVE MANAGEMENT: Primary | ICD-10-CM

## 2017-09-27 LAB — BETA HCG QUAL IFA URINE: NEGATIVE

## 2017-09-27 PROCEDURE — 99207 ZZC NO CHARGE NURSE ONLY: CPT

## 2017-09-27 PROCEDURE — 96372 THER/PROPH/DIAG INJ SC/IM: CPT

## 2017-09-27 PROCEDURE — 84703 CHORIONIC GONADOTROPIN ASSAY: CPT | Performed by: INTERNAL MEDICINE

## 2017-09-27 RX ORDER — MEDROXYPROGESTERONE ACETATE 150 MG/ML
150 INJECTION, SUSPENSION INTRAMUSCULAR
Qty: 1 ML | Refills: 0 | OUTPATIENT
Start: 2017-09-27 | End: 2018-01-08

## 2017-10-07 ENCOUNTER — HEALTH MAINTENANCE LETTER (OUTPATIENT)
Age: 27
End: 2017-10-07

## 2017-10-30 ENCOUNTER — TELEPHONE (OUTPATIENT)
Dept: FAMILY MEDICINE | Facility: CLINIC | Age: 27
End: 2017-10-30

## 2017-10-30 NOTE — TELEPHONE ENCOUNTER
Panel Management Review      Patient has the following on her problem list:     Depression / Dysthymia review    Measure:  Needs PHQ-9 score of 4 or less during index window.  Administer PHQ-9 and if score is 5 or more, send encounter to provider for next steps.    5 - 7 month window range:     PHQ-9 SCORE 10/10/2016 2/7/2017 3/14/2017   Total Score - - -   Total Score 20 9 10       If PHQ-9 recheck is 5 or more, route to provider for next steps.    Patient is due for:  PHQ9 and TANMAY 7    Asthma review     ACT Total Scores 2/7/2017   ACT TOTAL SCORE -   ASTHMA ER VISITS -   ASTHMA HOSPITALIZATIONS -   ACT TOTAL SCORE (Goal Greater than or Equal to 20) 20   In the past 12 months, how many times did you visit the emergency room for your asthma without being admitted to the hospital? 0   In the past 12 months, how many times were you hospitalized overnight because of your asthma? 0      1. Is Asthma diagnosis on the Problem List? Yes    2. Is Asthma listed on Health Maintenance? Yes    3. Patient is due for:  ACT          Composite cancer screening  Chart review shows that this patient is due/due soon for the following Pap Smear  Summary:    Patient is due/failing the following:   ACT, PAP, PHQ9 and PHYSICAL    Action needed:   Patient needs office visit for Physical and Pap.    Type of outreach:    Sent letter.    Questions for provider review:    None                                                                                                                                    Yenni Guzman LPN     Chart routed to Care Team .

## 2017-10-30 NOTE — LETTER
October 30, 2017    Erica Gordon  [address] [facility] [address]    Dear Elenita Maldonado cares about your health and your health plan.  I have reviewed your medical conditions, medication list and lab results, and am making recommendations based on this review to better manage your health.    You are in particular need of attention regarding:  -Asthma  -Cervical Cancer Screening  -Wellness (Physical) Visit     I am recommending that you:     -schedule a WELLNESS (Physical) APPOINTMENT with me.   I will check fasting labs the same day - nothing to eat except water and meds for 8-10 hours prior.    -schedule a PAP SMEAR EXAM which is due.  Please disregard this reminder if you have had this exam elsewhere within the last year.  It would be helpful for us to have a copy of your recent pap smear report in our file so that we can best coordinate your care.    If you are under/uninsured, we recommend you contact the [facility]. They offer pap smears at no charge or on a sliding fee charge. You can schedule with them at [phone]. Please have them send us the results.      Please call us at the [place] location:  [phone] or use [employer] to address the above recommendations.     Thank you for trusting [facility].  We appreciate the opportunity to serve you and look forward to supporting your healthcare in the future.    If you have (or plan to have) any of these tests done at a facility other than a [facility] or a [facility], please have the results sent to the [facility] location noted above.      Best Regards,    CHEMA Morataya

## 2018-01-08 ENCOUNTER — OFFICE VISIT (OUTPATIENT)
Dept: INTERNAL MEDICINE | Facility: CLINIC | Age: 28
End: 2018-01-08
Payer: COMMERCIAL

## 2018-01-08 VITALS
WEIGHT: 246.1 LBS | BODY MASS INDEX: 38.63 KG/M2 | DIASTOLIC BLOOD PRESSURE: 70 MMHG | HEIGHT: 67 IN | SYSTOLIC BLOOD PRESSURE: 122 MMHG | TEMPERATURE: 98.1 F | OXYGEN SATURATION: 100 % | HEART RATE: 89 BPM

## 2018-01-08 DIAGNOSIS — Z12.4 SCREENING FOR CERVICAL CANCER: ICD-10-CM

## 2018-01-08 DIAGNOSIS — Z76.0 ENCOUNTER FOR MEDICATION REFILL: ICD-10-CM

## 2018-01-08 DIAGNOSIS — F33.1 MODERATE EPISODE OF RECURRENT MAJOR DEPRESSIVE DISORDER (H): ICD-10-CM

## 2018-01-08 DIAGNOSIS — Z00.00 ROUTINE HISTORY AND PHYSICAL EXAMINATION OF ADULT: Primary | ICD-10-CM

## 2018-01-08 DIAGNOSIS — Z30.42 ENCOUNTER FOR SURVEILLANCE OF INJECTABLE CONTRACEPTIVE: ICD-10-CM

## 2018-01-08 PROCEDURE — 99395 PREV VISIT EST AGE 18-39: CPT | Performed by: INTERNAL MEDICINE

## 2018-01-08 PROCEDURE — G0145 SCR C/V CYTO,THINLAYER,RESCR: HCPCS | Performed by: INTERNAL MEDICINE

## 2018-01-08 RX ORDER — MEDROXYPROGESTERONE ACETATE 150 MG/ML
150 INJECTION, SUSPENSION INTRAMUSCULAR
Qty: 3 ML | Refills: 3
Start: 2018-01-08 | End: 2018-04-30 | Stop reason: ALTCHOICE

## 2018-01-08 ASSESSMENT — PATIENT HEALTH QUESTIONNAIRE - PHQ9: SUM OF ALL RESPONSES TO PHQ QUESTIONS 1-9: 16

## 2018-01-08 NOTE — PROGRESS NOTES
SUBJECTIVE:                                                      HPI: Erica Gordon is a pleasant 27 year old female who presents for a physical.    Patient would like to restart Prozac for depression.    She reports that she is uses in the past with success, though does not recall dose (and we do not have record of this medication in her system).    She also requests refill of Percocet because her prior provider, Dr. Izaguirre, has retired    ROS:  Constitutional: denies unintentional weight loss or gain; denies fevers, chills, or sweats     Cardiovascular: denies chest pain, palpitations, or edema  Respiratory: denies cough, wheezing, shortness of breath, or dyspnea on exertion  Gastrointestinal: denies nausea, vomiting, constipation, diarrhea, or abdominal pain  Genitourinary: denies urinary frequency, urgency, dysuria, or hematuria  Integumentary: denies rash or pruritus  Musculoskeletal: denies back pain, muscle pain, joint pain, or joint swelling  Neurologic: denies focal weakness, numbness, or tingling  Hematologic/Immunologic: denies history of anemia or blood transfusions  Endocrine: denies heat or cold intolerance; denies polyuria, polydipsia  Psychiatric: see PMH below    Past Medical History:   Diagnosis Date     Depression      Fibromyalgia      Mild intermittent asthma      Obesity (BMI 30-39.9)      Past Surgical History:   Procedure Laterality Date     NO HISTORY OF SURGERY       Family History   Problem Relation Age of Onset     Myocardial Infarction Maternal Grandmother      s/p PCI; later in life     Myocardial Infarction Maternal Grandfather      s/p CABG; later in life     DIABETES No family hx of      CEREBROVASCULAR DISEASE No family hx of      Coronary Artery Disease Early Onset No family hx of      Breast Cancer No family hx of      Ovarian Cancer No family hx of      Colon Cancer No family hx of      Social History     Social History     Marital status: Single     Spouse name: N/A      "Number of children: N/A     Years of education: N/A     Occupational History           Social History Main Topics     Smoking status: Current Every Day Smoker     Packs/day: 0.50     Years: 12.00     Types: Cigarettes     Smokeless tobacco: Never Used      Comment: 4-5 cigs/day as of 1/8/18     Alcohol use No     Drug use: No     Sexual activity: Yes     Partners: Male     Birth control/ protection: Injection     Social History Narrative    In a relationship.     3 kids (10, 8, and 2 as of 2018).     No formal exercise.      Allergies   Allergen Reactions     Rocephin [Ceftriaxone Sodium] Anaphylaxis     Penicillins Hives     Current Outpatient Prescriptions   Medication Sig     medroxyPROGESTERone (DEPO-PROVERA) 150 MG/ML injection Inject 1 mL (150 mg) into the muscle every 3 months     albuterol (PROAIR HFA/PROVENTIL HFA/VENTOLIN HFA) 108 (90 BASE) MCG/ACT Inhaler Inhale 2 puffs into the lungs 4 times daily as needed for shortness of breath / dyspnea     Immunization History   Administered Date(s) Administered     HepB 08/30/2000     Hib (PRP-T) 02/13/1990     Historical DTP/aP 1988, 1988, 02/07/1989, 02/13/1990, 08/24/1993     Influenza Vaccine IM 3yrs+ 4 Valent IIV4 09/28/2015     MMR 01/25/1990, 08/30/2000     OPV, trivalent, live 1988, 1988, 02/13/1990, 08/24/1993     TD (ADULT, 7+) 08/30/2000     TDAP Vaccine (Adacel) 02/09/2012     Tdap (Adacel,Boostrix) 05/11/2015     OBJECTIVE:                                                      /70 (BP Location: Left arm, Patient Position: Chair, Cuff Size: Adult Large)  Pulse 89  Temp 98.1  F (36.7  C) (Oral)  Ht 5' 6.9\" (1.699 m)  Wt 246 lb 1.6 oz (111.6 kg)  SpO2 100%  Breastfeeding? No  BMI 38.66 kg/m2  Constitutional: well-appearing  Eyes: normal conjunctivae and lids; pupils equal, round, and reactive to light  Ears, Nose, Mouth, and Throat: normal ears and nose; tympanic membranes visualized and normal; normal " lips, teeth, and gums; no oropharyngeal lesions or ulcers  Neck: supple and symmetric; no lymphadenopathy; no thyromegaly or masses  Respiratory: normal respiratory effort; clear to auscultation bilaterally  Cardiovascular: regular rate and rhythm; pedal pulses palpable; no edema  Breasts: normal appearance; no masses or skin retraction; no nipple discharge or bleeding; no axillary lymphadenopathy  Gastrointestinal: soft, non-tender, non-distended, and bowel sounds present; no organomegaly or masses  Genitourinary: external genitalia, urethral meatus, and vagina normal; cervix visualized and normal in appearance  Musculoskeletal: normal gait and station  Psych: normal judgment and insight; normal mood and affect; recent and remote memory intact; oriented to time, place, and person    PREVENTATIVE HEALTH                                                      Blood pressure: within normal limits   Breast CA screening: not medically indicated at this time   Cervical CA screening: DUE  Colon CA screening: not medically indicated at this time   Lung CA screening: not medically indicated at this time   Dexa: not medically indicated at this time   Screening HCV: n/a   Screening cholesterol: not medically indicated at this time   Screening diabetes: not medically indicated at this time   STD testing: no risk factors present  Depression screening: PHQ-2 assessment completed and reviewed - no intervention indicated at this time  Alcohol misuse screening: alcohol use reviewed - no intervention indicated at this time  Immunizations: reviewed; flu shot DUE - patient declines    ASSESSMENT/PLAN:                                                       (Z00.00) Routine history and physical examination of adult  (primary encounter diagnosis)  Comment: PMH, PSH, FH, SH, medications, allergies, immunizations, and preventative health measures reviewed.   Plan: see below for plans.    (Z12.4) Screening for cervical cancer  Plan: pap smear  obtained today.    (Z30.42) Encounter for surveillance of injectable contraceptive  Plan: Double shot given today.    (F33.1) Moderate episode of recurrent major depressive disorder (H)  Plan:    - RESTART Prozac 20 mg daily (10 mg daily for the first week)..   - follow-up in 6-8 weeks (earlier as needed).     (Z76.0) Encounter for medication refill  Plan:    - refill for Percocet declined (no need for this medication identified or discussed today).   - referral to pain clinic offered - patient declines.    The instructions on the AVS were discussed and explained to the patient. Patient expressed understanding of instructions.    (Chart documentation was completed, in part, with Re[employer] voice-recognition software. Even though reviewed, some grammatical, spelling, and word errors may remain.)    Charlene Culver MD   [address] [address]  T: [phone], F: [phone]

## 2018-01-08 NOTE — LETTER
January 16, 2018      Erica Gordon  [address] [place] [id number]    Dear ,      I am happy to inform you that your recent cervical cancer screening test (PAP smear) was normal.      Preventative screenings such as this help to ensure your health for years to come. You should repeat a pap smear in 3 years, unless otherwise directed.      You will still need to return to the clinic every year for your annual exam and other preventive tests.     Please contact the clinic at [phone] if you have further questions.       Sincerely,      Charlene Culver MD/patrice

## 2018-01-08 NOTE — MR AVS SNAPSHOT
"              After Visit Summary   1/8/2018    Erica Gordon    MRN: [id number]           Patient Information     Date Of Birth          1990        Visit Information        Provider Department      1/8/2018 10:30 AM Charlene Culver MD [facility]        Today's Diagnoses     Moderate episode of recurrent major depressive disorder (H)    -  1    Encounter for surveillance of injectable contraceptive        Screening for cervical cancer          Care Instructions    Depo shot today.    ---    Pap smear results take ~1 week to come back.    ---    START Prozac: 1/2 tabs daily x 1 week, then full tab daily after that.     Follow-up with me in ~6-8 weeks (earlier as needed).    ---    Re: percocet - I do not have much experience with this medication - can always refer to a pain specialist with advanced pain medication training.           Follow-ups after your visit        Who to contact     If you have questions or need follow up information about today's clinic visit or your schedule please contact [facility] directly at [phone].  Normal or non-critical lab and imaging results will be communicated to you by Moogihart, letter or phone within 4 business days after the clinic has received the results. If you do not hear from us within 7 days, please contact the clinic through "[employer]"t or phone. If you have a critical or abnormal lab result, we will notify you by phone as soon as possible.  Submit refill requests through ClientShow or call your pharmacy and they will forward the refill request to us. Please allow 3 business days for your refill to be completed.          Additional Information About Your Visit        MyChart Information     ClientShow lets you send messages to your doctor, view your test results, renew your prescriptions, schedule appointments and more. To sign up, go to [email or web address] . Click on \"Log in\" on the left side of the screen, which " "will take you to the Welcome page. Then click on \"Sign up Now\" on the right side of the page.     You will be asked to enter the access code listed below, as well as some personal information. Please follow the directions to create your username and password.     Your access code is: [email or web address]  Expires: 2018 11:06 AM     Your access code will  in 90 days. If you need help or a new code, please call your [facility] or [phone].        Care EveryWhere ID     This is your Care EveryWhere ID. This could be used by other organizations to access your [place] medical records  [id number]        Your Vitals Were     Pulse Temperature Height Pulse Oximetry Breastfeeding? BMI (Body Mass Index)    89 98.1  F (36.7  C) (Oral) 5' 6.9\" (1.699 m) 100% No 38.66 kg/m2       Blood Pressure from Last 3 Encounters:   18 122/70   17 120/88   17 118/68    Weight from Last 3 Encounters:   18 246 lb 1.6 oz (111.6 kg)   17 247 lb 8 oz (112.3 kg)   17 245 lb (111.1 kg)              We Performed the Following     Pap imaged thin layer screen reflex to HPV if ASCUS - recommend age 25 - 29          Today's Medication Changes          These changes are accurate as of: 18 11:06 AM.  If you have any questions, ask your nurse or doctor.               Start taking these medicines.        Dose/Directions    FLUoxetine 20 MG capsule   Commonly known as:  PROzac   Used for:  Moderate episode of recurrent major depressive disorder (H)   Started by:  Charlene Culver MD        Dose:  20 mg   Take 1 capsule (20 mg) by mouth daily   Quantity:  90 capsule   Refills:  0         These medicines have changed or have updated prescriptions.        Dose/Directions    * medroxyPROGESTERone 150 MG/ML injection   Commonly known as:  DEPO-PROVERA   This may have changed:  Another medication with the same name was added. Make sure you understand how and when to take each.   Used for:  Contraceptive " management        Dose:  150 mg   Inject 1 mL (150 mg) into the muscle every 3 months   Quantity:  1 mL   Refills:  0       * medroxyPROGESTERone 150 MG/ML injection   Commonly known as:  DEPO-PROVERA   This may have changed:  You were already taking a medication with the same name, and this prescription was added. Make sure you understand how and when to take each.   Used for:  Encounter for surveillance of injectable contraceptive   Changed by:  Charlene Culver MD        Dose:  150 mg   Inject 1 mL (150 mg) into the muscle every 3 months   Quantity:  3 mL   Refills:  3       * Notice:  This list has 2 medication(s) that are the same as other medications prescribed for you. Read the directions carefully, and ask your doctor or other care provider to review them with you.         Where to get your medicines      These medications were sent to [facility] [id number] [place] [address] [address] [facility] [address]  [address], [facility] [address]    Hours:  24-hours Phone:  [phone]     FLUoxetine 20 MG capsule         Some of these will need a paper prescription and others can be bought over the counter.  Ask your nurse if you have questions.     You don't need a prescription for these medications     medroxyPROGESTERone 150 MG/ML injection                Primary Care Provider    Omar Izaguirre MD       No address on file        Equal Access to Services     ESTELLA ZAMORA AH: Hadii kellie ortiz hadasho Sojovani, waaxda luqadaha, qaybta kaalmada adeegyada, moses gonzales . So [facility] [phone].    ATENCIÓN: Si habla español, tiene a bassett disposición servicios gratuitos de asistencia lingüística. Llame al [phone].    We comply with applicable federal civil rights laws and [place] laws. We do not discriminate on the basis of race, color, national origin, age, disability, sex, sexual orientation, or gender identity.            Thank you!     Thank you for  choosing [facility]  for your care. Our goal is always to provide you with excellent care. Hearing back from our patients is one way we can continue to improve our services. Please take a few minutes to complete the written survey that you may receive in the mail after your visit with us. Thank you!             Your Updated Medication List - Protect others around you: Learn how to safely use, store and throw away your medicines at www.disposemymeds.org.          This list is accurate as of: 1/8/18 11:06 AM.  Always use your most recent med list.                   Brand Name Dispense Instructions for use Diagnosis    albuterol 108 (90 BASE) MCG/ACT Inhaler    PROAIR HFA/PROVENTIL HFA/VENTOLIN HFA    1 Inhaler    Inhale 2 puffs into the lungs 4 times daily as needed for shortness of breath / dyspnea    Intermittent asthma, uncomplicated       FLUoxetine 20 MG capsule    PROzac    90 capsule    Take 1 capsule (20 mg) by mouth daily    Moderate episode of recurrent major depressive disorder (H)       * medroxyPROGESTERone 150 MG/ML injection    DEPO-PROVERA    1 mL    Inject 1 mL (150 mg) into the muscle every 3 months    Contraceptive management       * medroxyPROGESTERone 150 MG/ML injection    DEPO-PROVERA    3 mL    Inject 1 mL (150 mg) into the muscle every 3 months    Encounter for surveillance of injectable contraceptive       TYLENOL PO      Take 1,000 mg by mouth as needed for mild pain or fever        * Notice:  This list has 2 medication(s) that are the same as other medications prescribed for you. Read the directions carefully, and ask your doctor or other care provider to review them with you.

## 2018-01-08 NOTE — PATIENT INSTRUCTIONS
Depo shot today.    ---    Pap smear results take ~1 week to come back.    ---    START Prozac: 1/2 tabs daily x 1 week, then full tab daily after that.     Follow-up with me in ~6-8 weeks (earlier as needed).    ---    Re: percocet - I do not have much experience with this medication - can always refer to a pain specialist with advanced pain medication training.

## 2018-01-10 LAB
COPATH REPORT: NORMAL
PAP: NORMAL

## 2018-03-22 DIAGNOSIS — F33.1 MODERATE EPISODE OF RECURRENT MAJOR DEPRESSIVE DISORDER (H): ICD-10-CM

## 2018-03-22 NOTE — TELEPHONE ENCOUNTER
"Requested Prescriptions   Pending Prescriptions Disp Refills     FLUoxetine (PROZAC) 20 MG capsule [Pharmacy Med Name: FLUOXETINE 20MG CAPSULES] 90 capsule 0     Sig: TAKE 1 CAPSULE(20 MG) BY MOUTH DAILY    SSRIs Protocol Failed    3/22/2018  3:17 PM       Failed - PHQ-9 score less than 5 in past 6 months    Please review last PHQ-9 score.          Passed - Patient is age 18 or older       Passed - No active pregnancy on record       Passed - No positive pregnancy test in last 12 months       Passed - Recent (6 mo) or future (30 days) visit within the authorizing provider's specialty    Patient had office visit in the last 6 months or has a visit in the next 30 days with authorizing provider or within the authorizing provider's specialty.  See \"Patient Info\" tab in inbasket, or \"Choose Columns\" in Meds & Orders section of the refill encounter.            Last Written Prescription Date:  1/8/18  Last Fill Quantity: 90,  # refills: 0   Last office visit: 1/8/2018 with prescribing provider:  1/8/18   Future Office Visit:    PHQ-9 SCORE 2/7/2017 3/14/2017 1/8/2018   Total Score - - -   Total Score 9 10 16       "

## 2018-03-22 NOTE — TELEPHONE ENCOUNTER
Routing refill request to provider for review/approval because:  Patient needs to be seen because:  Overdue for f/u appt  Elevated PHQ-9 score

## 2018-03-26 DIAGNOSIS — J45.20 INTERMITTENT ASTHMA, UNCOMPLICATED: ICD-10-CM

## 2018-03-26 NOTE — TELEPHONE ENCOUNTER
Spoke with Erica and she stated that she needed to make 3 appointments but she was driving and was unable to make the appointments right now.  Pt stated that she was aware that she needed an appointment, but when she got back to the [facility] she would call and make an OV.     Heidi.GREGORIO Florentino ([facility])

## 2018-03-26 NOTE — TELEPHONE ENCOUNTER
"Requested Prescriptions   Pending Prescriptions Disp Refills     VENTOLIN  (90 BASE) MCG/ACT Inhaler [Pharmacy Med Name: VENTOLIN HFA INH W/DOS CTR 200PUFFS]  Last Written Prescription Date:  2/15/18  Last Fill Quantity: 18 g,  # refills: 1   Last office visit: 3/14/2017 with prescribing provider:  Dmitriy   Future Office Visit:     18 g 0     Sig: INHALE 2 PUFFS INTO THE LUNGS FOUR TIMES DAILY AS NEEDED FOR SHORTNESS OF BREATH OR DIFFICULTY BREATHING    Asthma Maintenance Inhalers - Anticholinergics Failed    3/26/2018  3:15 PM       Failed - Asthma control assessment score within normal limits in last 6 months    Please review ACT score.          Failed - Recent (6 mo) or future (30 days) visit within the authorizing provider's specialty    Patient had office visit in the last 6 months or has a visit in the next 30 days with authorizing provider or within the authorizing provider's specialty.  See \"Patient Info\" tab in inbasket, or \"Choose Columns\" in Meds & Orders section of the refill encounter.           Passed - Patient is age 12 years or older          "

## 2018-03-28 RX ORDER — ALBUTEROL SULFATE 90 UG/1
AEROSOL, METERED RESPIRATORY (INHALATION)
Qty: 18 G | Refills: 0 | Status: SHIPPED | OUTPATIENT
Start: 2018-03-28 | End: 2018-04-05

## 2018-03-28 NOTE — TELEPHONE ENCOUNTER
Routing refill request to provider for review/approval because:  ACT score  1-8-2018=16  Routing refill request to provider for review/approval because:  Patient needs to be seen because it has been more than 1 year since last office visit at [facility].  Will forward request to Dr Culver.

## 2018-04-03 ENCOUNTER — OFFICE VISIT (OUTPATIENT)
Dept: URGENT CARE | Facility: URGENT CARE | Age: 28
End: 2018-04-03
Payer: COMMERCIAL

## 2018-04-03 VITALS
DIASTOLIC BLOOD PRESSURE: 68 MMHG | OXYGEN SATURATION: 96 % | RESPIRATION RATE: 16 BRPM | TEMPERATURE: 98.8 F | SYSTOLIC BLOOD PRESSURE: 98 MMHG | HEART RATE: 73 BPM

## 2018-04-03 DIAGNOSIS — R07.0 THROAT PAIN: Primary | ICD-10-CM

## 2018-04-03 DIAGNOSIS — R09.81 NASAL CONGESTION: ICD-10-CM

## 2018-04-03 DIAGNOSIS — R22.1 THROAT SWELLING: ICD-10-CM

## 2018-04-03 LAB
DEPRECATED S PYO AG THROAT QL EIA: NORMAL
SPECIMEN SOURCE: NORMAL

## 2018-04-03 PROCEDURE — 87880 STREP A ASSAY W/OPTIC: CPT | Performed by: PHYSICIAN ASSISTANT

## 2018-04-03 PROCEDURE — 99214 OFFICE O/P EST MOD 30 MIN: CPT | Performed by: PHYSICIAN ASSISTANT

## 2018-04-03 PROCEDURE — 87081 CULTURE SCREEN ONLY: CPT | Performed by: PHYSICIAN ASSISTANT

## 2018-04-03 RX ORDER — PREDNISONE 20 MG/1
20 TABLET ORAL 2 TIMES DAILY
Qty: 10 TABLET | Refills: 0 | Status: SHIPPED | OUTPATIENT
Start: 2018-04-03 | End: 2018-04-08

## 2018-04-03 RX ORDER — AZITHROMYCIN 250 MG/1
TABLET, FILM COATED ORAL
Qty: 6 TABLET | Refills: 0 | Status: SHIPPED | OUTPATIENT
Start: 2018-04-03 | End: 2018-04-25

## 2018-04-03 NOTE — LETTER
[facility] [place] [facility]  [address] [address]  [phone]      April 3, 2018    RE:  Erica Gordon                                                                                                                                                       [address] [facility] [address]            To whom it may concern:    Erica Gordon is under my professional care for an illness.  She will miss work today.         Sincerely,        Sylvester Castellon [facility]

## 2018-04-03 NOTE — NURSING NOTE
"Chief Complaint   Patient presents with     Urgent Care     Pt c/o dry and sore throat.        Initial BP 98/68  Pulse 73  Temp 98.8  F (37.1  C) (Oral)  Resp 16  SpO2 96% Estimated body mass index is 38.66 kg/(m^2) as calculated from the following:    Height as of 1/8/18: 5' 6.9\" (1.699 m).    Weight as of 1/8/18: 246 lb 1.6 oz (111.6 kg).  Medication Reconciliation: unable or not appropriate to perform    Boo Plascencia CMA  "

## 2018-04-03 NOTE — PROGRESS NOTES
SUBJECTIVE:  Erica Gordon is a 27 year old female with a chief complaint of sore throat.  Onset of symptoms was 3 day(s) ago.    Course of illness: still present.  Severity mild and moderate  Current and Associated symptoms: throat pain, throat swelling and sinus congestion  Treatment measures tried include OTC medications.  Predisposing factors include recent illness.    Past Medical History:   Diagnosis Date     Depression      Fibromyalgia      Mild intermittent asthma      Obesity (BMI 30-39.9)      Allergies   Allergen Reactions     Rocephin [Ceftriaxone Sodium] Anaphylaxis     Penicillins Hives     Social History   Substance Use Topics     Smoking status: Current Every Day Smoker     Packs/day: 0.50     Years: 12.00     Types: Cigarettes     Smokeless tobacco: Never Used      Comment: 4-5 cigs/day as of 1/8/18     Alcohol use No       ROS:  CONSTITUTIONAL:NEGATIVE for fever, chills, change in weight  INTEGUMENTARY/SKIN: NEGATIVE for worrisome rashes, moles or lesions  EYES: NEGATIVE for vision changes or irritation  ENT/MOUTH: POSITIVE for throat pain, throat swelling  RESP:NEGATIVE for significant cough or SOB  CV: NEGATIVE for chest pain, palpitations or peripheral edema  GI: NEGATIVE for nausea, abdominal pain, heartburn, or change in bowel habits  MUSCULOSKELETAL: NEGATIVE for significant arthralgias or myalgia  NEURO: NEGATIVE for weakness, dizziness or paresthesias    OBJECTIVE:   BP 98/68  Pulse 73  Temp 98.8  F (37.1  C) (Oral)  Resp 16  SpO2 96%  GENERAL APPEARANCE: healthy, alert and no distress  EYES: EOMI,  PERRL, conjunctiva clear  HENT: TM's normal bilaterally and tonsillar erythema  NECK: no adenopathy  RESP: lungs clear to auscultation - no rales, rhonchi or wheezes  CV: regular rates and rhythm, normal S1 S2, no murmur noted  ABDOMEN:  soft, nontender, no HSM or masses and bowel sounds normal  SKIN: no suspicious lesions or rashes    Results for orders placed or performed in visit on  04/03/18   Strep, Rapid Screen   Result Value Ref Range    Specimen Description Throat     Rapid Strep A Screen       NEGATIVE: No Group A streptococcal antigen detected by immunoassay, await culture report.         ASSESSMENT:      Throat pain  Throat swelling  Nasal congestion    PLAN:   See orders in epic.   Symptomatic treat with gargles, lozenges, and OTC analgesic as needed. Follow-up with primary clinic if not improving.  Orders Placed This Encounter     azithromycin (ZITHROMAX) 250 MG tablet     predniSONE (DELTASONE) 20 MG tablet       Strep culture pending  Prednisone for throat swelling  zithromax for infection  Follow up with PCP as needed

## 2018-04-03 NOTE — MR AVS SNAPSHOT
"              After Visit Summary   4/3/2018    Erica Gordon    MRN: [id number]           Patient Information     Date Of Birth          1990        Visit Information        Provider Department      4/3/2018 12:15 PM Sylvester Castellon PA-C [facility]        Today's Diagnoses     Throat pain    -  1    Throat swelling        Nasal congestion           Follow-ups after your visit        Your next 10 appointments already scheduled     Apr 05, 2018  2:30 PM CDT   Office Visit with Charlene Culver MD   [facility]    [address] [id number] [phone]           Bring a current list of meds and any records pertaining to this visit. For Physicals, please bring immunization records and any forms needing to be filled out. Please arrive 10 minutes early to complete paperwork.              Who to contact     If you have questions or need follow up information about today's clinic visit or your schedule please contact [facility] directly at [phone].  Normal or non-critical lab and imaging results will be communicated to you by [facility], letter or phone within 4 business days after the clinic has received the results. If you do not hear from us within 7 days, please contact the clinic through [employer] or phone. If you have a critical or abnormal lab result, we will notify you by phone as soon as possible.  Submit refill requests through [facility] or call your pharmacy and they will forward the refill request to us. Please allow 3 business days for your refill to be completed.          Additional Information About Your Visit        MyChart Information     [facility] lets you send messages to your doctor, view your test results, renew your prescriptions, schedule appointments and more. To sign up, go to [email or web address] . Click on \"Log in\" on the left side of the screen, " "which will take you to the Welcome page. Then click on \"Sign up Now\" on the right side of the page.     You will be asked to enter the access code listed below, as well as some personal information. Please follow the directions to create your username and password.     Your access code is: [email or web address]  Expires: 2018 12:06 PM     Your access code will  in 90 days. If you need help or a new code, please call your [facility] or [phone].        Care EveryWhere ID     This is your Care EveryWhere ID. This could be used by other organizations to access your [place] medical records  [id number]        Your Vitals Were     Pulse Temperature Respirations Pulse Oximetry          73 98.8  F (37.1  C) (Oral) 16 96%         Blood Pressure from Last 3 Encounters:   18 98/68   18 122/70   17 120/88    Weight from Last 3 Encounters:   18 246 lb 1.6 oz (111.6 kg)   17 247 lb 8 oz (112.3 kg)   17 245 lb (111.1 kg)              We Performed the Following     Beta strep group A culture     Strep, Rapid Screen          Today's Medication Changes          These changes are accurate as of 4/3/18  1:57 PM.  If you have any questions, ask your nurse or doctor.               Start taking these medicines.        Dose/Directions    azithromycin 250 MG tablet   Commonly known as:  ZITHROMAX   Used for:  Throat swelling, Nasal congestion   Started by:  Sylvester Castellon PA-C        2 tabs po qd day 1, then 1 tab po qd days 2-5   Quantity:  6 tablet   Refills:  0       predniSONE 20 MG tablet   Commonly known as:  DELTASONE   Used for:  Throat pain, Throat swelling   Started by:  Sylvester Castellon PA-C        Dose:  20 mg   Take 1 tablet (20 mg) by mouth 2 times daily for 5 days   Quantity:  10 tablet   Refills:  0            Where to get your medicines      These medications were sent to [facility] - [place], [place] - [address] [place] [facility] [address] [facility] [address]    Hours:  24-hours Phone:  [phone]     azithromycin 250 MG tablet    predniSONE 20 MG tablet                Primary Care Provider Office Phone # Fax #    Charlene Culver [phone] [address] [facility] [address]        Equal Access to Services     [facility]: Hadii aad ku hadasho Soomaali, waaxda luqadaha, qaybta kaalmada adeegyada, waxay idiin hayaan adeeg kharash laJessicaaan . So [facility] [phone].    ATENCIÓN: Si habla español, tiene a bassett disposición servicios gratuitos de asistencia lingüística. Llame al [phone].    We comply with applicable federal civil rights laws and [place] laws. We do not discriminate on the basis of race, color, national origin, age, disability, sex, sexual orientation, or gender identity.            Thank you!     Thank you for choosing [facility]  for your care. Our goal is always to provide you with excellent care. Hearing back from our patients is one way we can continue to improve our services. Please take a few minutes to complete the written survey that you may receive in the mail after your visit with us. Thank you!             Your Updated Medication List - Protect others around you: Learn how to safely use, store and throw away your medicines at www.disposemymeds.org.          This list is accurate as of 4/3/18  1:57 PM.  Always use your most recent med list.                   Brand Name Dispense Instructions for use Diagnosis    azithromycin 250 MG tablet    ZITHROMAX    6 tablet    2 tabs po qd day 1, then 1 tab po qd days 2-5    Throat swelling, Nasal congestion       FLUoxetine 20 MG capsule    PROzac    30 capsule    Take 1 capsule (20 mg) by mouth daily    Moderate episode of recurrent major depressive disorder (H)       medroxyPROGESTERone 150 MG/ML injection    DEPO-PROVERA    3 mL    Inject 1 mL (150 mg) into the muscle every 3 months    Encounter for surveillance of  injectable contraceptive       predniSONE 20 MG tablet    DELTASONE    10 tablet    Take 1 tablet (20 mg) by mouth 2 times daily for 5 days    Throat pain, Throat swelling       VENTOLIN  (90 BASE) MCG/ACT Inhaler   Generic drug:  albuterol     18 g    INHALE 2 PUFFS INTO THE LUNGS FOUR TIMES DAILY AS NEEDED FOR SHORTNESS OF BREATH OR DIFFICULTY BREATHING    Intermittent asthma, uncomplicated

## 2018-04-04 LAB
BACTERIA SPEC CULT: NORMAL
SPECIMEN SOURCE: NORMAL

## 2018-04-05 ENCOUNTER — OFFICE VISIT (OUTPATIENT)
Dept: INTERNAL MEDICINE | Facility: CLINIC | Age: 28
End: 2018-04-05
Payer: COMMERCIAL

## 2018-04-05 VITALS
RESPIRATION RATE: 18 BRPM | TEMPERATURE: 99.1 F | HEIGHT: 67 IN | SYSTOLIC BLOOD PRESSURE: 124 MMHG | HEART RATE: 85 BPM | BODY MASS INDEX: 41.47 KG/M2 | OXYGEN SATURATION: 98 % | WEIGHT: 264.2 LBS | DIASTOLIC BLOOD PRESSURE: 60 MMHG

## 2018-04-05 DIAGNOSIS — F32.A ANXIETY AND DEPRESSION: Primary | ICD-10-CM

## 2018-04-05 DIAGNOSIS — E66.01 MORBID OBESITY (H): ICD-10-CM

## 2018-04-05 DIAGNOSIS — R63.5 UNINTENDED WEIGHT GAIN: ICD-10-CM

## 2018-04-05 DIAGNOSIS — F41.9 ANXIETY AND DEPRESSION: Primary | ICD-10-CM

## 2018-04-05 DIAGNOSIS — M79.7 FIBROMYALGIA: ICD-10-CM

## 2018-04-05 DIAGNOSIS — Z30.42 DEPO-PROVERA CONTRACEPTIVE STATUS: ICD-10-CM

## 2018-04-05 DIAGNOSIS — J45.30 MILD PERSISTENT ASTHMA WITHOUT COMPLICATION: ICD-10-CM

## 2018-04-05 DIAGNOSIS — Z76.89 REFERRAL OF PATIENT: ICD-10-CM

## 2018-04-05 PROCEDURE — 99214 OFFICE O/P EST MOD 30 MIN: CPT | Performed by: INTERNAL MEDICINE

## 2018-04-05 RX ORDER — DULOXETIN HYDROCHLORIDE 30 MG/1
30 CAPSULE, DELAYED RELEASE ORAL 2 TIMES DAILY
Qty: 90 CAPSULE | Refills: 0 | Status: SHIPPED | OUTPATIENT
Start: 2018-04-05 | End: 2018-04-30

## 2018-04-05 RX ORDER — ALBUTEROL SULFATE 90 UG/1
1-2 AEROSOL, METERED RESPIRATORY (INHALATION) EVERY 6 HOURS PRN
Qty: 18 G | Refills: 0 | Status: SHIPPED | OUTPATIENT
Start: 2018-04-05 | End: 2018-05-04

## 2018-04-05 ASSESSMENT — PAIN SCALES - GENERAL: PAINLEVEL: NO PAIN (0)

## 2018-04-05 NOTE — PATIENT INSTRUCTIONS
Recommend IUD (Mirena vs. Paraguard).    Referral to ob/gyn placed - please schedule and discuss coverage of IUD with insurance.     ---    Continue prozac 20mg daily.     START Cymbalta 30mg daily.    Follow-up with me in ~6-8 weeks if possible (earlier as needed).    ---    Refill of albuterol provided.

## 2018-04-05 NOTE — PROGRESS NOTES
SUBJECTIVE:                                                      HPI: Erica Gordon is a pleasant 27 year old female who presents for follow-up of anxiety and depression:    Was started on Prozac 20mg daily several months ago. Tolerating well - no adverse side effects.    Has noticed improvement in both anxiety and depression, but depression more so.    She is concerned that her anxiety continues to be significant. She is unwilling to increase the dose of Prozac at this time.    She has tried Buspar in the past with adverse side effects.     PMH significant for FM - not being treated currently.     Discussed adding Cymbalta to regimen to help address both mood and FM - patient agreeable.     ---    Patient is also concerned about recent, significant weight gain (>20lbs). She is now MO (BMI ~41).     This is likely due to her Depo shots, which she started receiving several months ago.    Patient is a current smoker, so estrogen containing contraception is NOT recommended.     Patient is interested in getting an IUD for birth control.  - discussed methods of insertion  - briefly discussed potential risks and benefits of both methods              - risks of IUD include, but not limited to:                          - pain/discomfort of placement    - irregular bleeding/spotting    - expulsion, uterine perforation    - increased risk of PID    - increased risk of extrauterine pregnancy              - benefits of include: no regular attention needed, high efficacy against pregnancy, and lighter periods  - discussed length of efficacy ( 3 v. 5. 10 years)  - discussed continued need for STD protection if that is a concern  - patient would like to proceed with IUD placement              - referred to ob/gyn    ---    Finally, she needs a refill of albuterol.     ---    The medication, allergy, and problem lists have been reviewed and updated as appropriate.       OBJECTIVE:                                                 "      /60 (BP Location: Left arm, Patient Position: Chair, Cuff Size: Adult Large)  Pulse 85  Temp 99.1  F (37.3  C) (Oral)  Resp 18  Ht 5' 6.9\" (1.699 m)  Wt 264 lb 3.2 oz (119.8 kg)  SpO2 98%  Breastfeeding? No  BMI 41.5 kg/m2  Constitutional: well-appearing  Psych: normal judgment and insight; normal mood and affect; recent and remote memory intact      ASSESSMENT/PLAN:                                                      (F41.8) Anxiety and depression  (primary encounter diagnosis)  (M79.7) Fibromyalgia  Comment:    - both anxiety and depression improved, but anxiety more so.    - patient does not want to increase dose of Prozac.  Plan:    - CONTINUE Prozac 20mg daily.   - START Cymbalta 30mg daily (this should also help with FM).    - follow-up in 6-8 weeks (earlier as needed).     (R63.5) Unintended weight gain  (E66.01) Morbid obesity (H)  (Z30.40) Depo-Provera contraceptive status  (Z76.89) Referral of patient  Comment:    - suspect significant, recent weight gain due to recently starting Depo.   - no estrogen containing contraception in the setting of current tobacco use.    - IUD discussed - see above.   Plan: referred to ob/gyn for IUD.     (J45.30) Mild persistent asthma without complication  Plan: refill of albuterol provided.     The instructions on the AVS were discussed and explained to the patient. Patient expressed understanding of instructions.    (Chart documentation was completed, in part, with Xtium voice-recognition software. Even though reviewed, some grammatical, spelling, and word errors may remain.)    Charlene Culver MD   [address] [address]  T: [phone], F: [phone]    "

## 2018-04-05 NOTE — MR AVS SNAPSHOT
After Visit Summary   4/5/2018    Erica Gordon    MRN: [id number]           Patient Information     Date Of Birth          1990        Visit Information        Provider Department      4/5/2018 2:30 PM Charlene Culver MD [facility]        Today's Diagnoses     Referral of patient    -  1    Fibromyalgia        Anxiety and depression        Moderate episode of recurrent major depressive disorder (H)        Intermittent asthma, uncomplicated          Care Instructions    Recommend IUD (Mirena vs. Paraguard).    Referral to ob/gyn placed - please schedule and discuss coverage of IUD with insurance.     ---    Continue prozac 20mg daily.     START Cymbalta 30mg daily.    Follow-up with me in ~6-8 weeks if possible (earlier as needed).    ---    Refill of albuterol provided.           Follow-ups after your visit        Additional Services     OB/GYN REFERRAL       Your provider has referred you to:  FMG: [facility] [phone]   [email or web address]  FMG: [facility] [phone]  [email or web address]    Please be aware that coverage of these services is subject to the terms and limitations of your health insurance plan.  Call member services at your health plan with any benefit or coverage questions.      Please bring the following with you to your appointment:    (1) Any X-Rays, CTs or MRIs which have been performed.  Contact the facility where they were done to arrange for  prior to your scheduled appointment.   (2) List of current medications   (3) This referral request   (4) Any documents/labs given to you for this referral                  Who to contact     If you have questions or need follow up information about today's clinic visit or your schedule please contact [facility] directly at [phone].  Normal or  "non-critical lab and imaging results will be communicated to you by MyChart, letter or phone within 4 business days after the clinic has received the results. If you do not hear from us within 7 days, please contact the clinic through [employer] or phone. If you have a critical or abnormal lab result, we will notify you by phone as soon as possible.  Submit refill requests through [employer] or call your pharmacy and they will forward the refill request to us. Please allow 3 business days for your refill to be completed.          Additional Information About Your Visit        [employer] Information     [employer] lets you send messages to your doctor, view your test results, renew your prescriptions, schedule appointments and more. To sign up, go to [email or web address] . Click on \"Log in\" on the left side of the screen, which will take you to the Welcome page. Then click on \"Sign up Now\" on the right side of the page.     You will be asked to enter the access code listed below, as well as some personal information. Please follow the directions to create your username and password.     Your access code is: [email or web address]  Expires: 2018 12:06 PM     Your access code will  in 90 days. If you need help or a new code, please call your [place] clinic or [phone].        Care EveryWhere ID     This is your Care EveryWhere ID. This could be used by other organizations to access your [place] medical records  [id number]        Your Vitals Were     Pulse Temperature Respirations Height Pulse Oximetry Breastfeeding?    85 99.1  F (37.3  C) (Oral) 18 5' 6.9\" (1.699 m) 98% No    BMI (Body Mass Index)                   41.5 kg/m2            Blood Pressure from Last 3 Encounters:   18 124/60   18 98/68   18 122/70    Weight from Last 3 Encounters:   18 264 lb 3.2 oz (119.8 kg)   18 246 lb 1.6 oz (111.6 kg)   17 247 lb 8 oz (112.3 kg)              We Performed the Following     OB/GYN " REFERRAL          Today's Medication Changes          These changes are accurate as of 4/5/18  2:56 PM.  If you have any questions, ask your nurse or doctor.               Start taking these medicines.        Dose/Directions    DULoxetine 30 MG EC capsule   Commonly known as:  CYMBALTA   Used for:  Fibromyalgia, Anxiety and depression   Started by:  Charlene Culver MD        Dose:  30 mg   Take 1 capsule (30 mg) by mouth 2 times daily   Quantity:  90 capsule   Refills:  0         These medicines have changed or have updated prescriptions.        Dose/Directions    albuterol 108 (90 BASE) MCG/ACT Inhaler   Commonly known as:  VENTOLIN HFA   This may have changed:  See the new instructions.   Used for:  Intermittent asthma, uncomplicated   Changed by:  Charlene Culver MD        Dose:  1-2 puff   Inhale 1-2 puffs into the lungs every 6 hours as needed for shortness of breath / dyspnea or wheezing   Quantity:  18 g   Refills:  0            Where to get your medicines      These medications were sent to [facility] [address] [place] [address] [address] [place] [address] [facility] [address]    Hours:  24-hours Phone:  [phone]     albuterol 108 (90 BASE) MCG/ACT Inhaler    DULoxetine 30 MG EC capsule    FLUoxetine 20 MG capsule                Primary Care Provider Office Phone # Fax #    Charlene Culver -[phone]       [address] [place] [address]        Equal Access to Services     [facility]: Hadii kellie ku hadasho Soomaali, waaxda luqadaha, qaybta kaalmada adeegyada, [facility]. So [facility] [phone].    ATENCIÓN: Si habla gabe, tiene a bassett disposición servicios gratuitos de asistencia lingüística. Llame al [phone].    We comply with applicable federal civil rights laws and [place] laws. We do not discriminate on the basis of race, color, national origin, age, disability, sex, sexual orientation,  or gender identity.            Thank you!     Thank you for choosing [facility]  for your care. Our goal is always to provide you with excellent care. Hearing back from our patients is one way we can continue to improve our services. Please take a few minutes to complete the written survey that you may receive in the mail after your visit with us. Thank you!             Your Updated Medication List - Protect others around you: Learn how to safely use, store and throw away your medicines at www.disposemymeds.org.          This list is accurate as of 4/5/18  2:56 PM.  Always use your most recent med list.                   Brand Name Dispense Instructions for use Diagnosis    albuterol 108 (90 BASE) MCG/ACT Inhaler    VENTOLIN HFA    18 g    Inhale 1-2 puffs into the lungs every 6 hours as needed for shortness of breath / dyspnea or wheezing    Intermittent asthma, uncomplicated       azithromycin 250 MG tablet    ZITHROMAX    6 tablet    2 tabs po qd day 1, then 1 tab po qd days 2-5    Throat swelling, Nasal congestion       DULoxetine 30 MG EC capsule    CYMBALTA    90 capsule    Take 1 capsule (30 mg) by mouth 2 times daily    Fibromyalgia, Anxiety and depression       FLUoxetine 20 MG capsule    PROzac    90 capsule    Take 1 capsule (20 mg) by mouth daily    Moderate episode of recurrent major depressive disorder (H)       medroxyPROGESTERone 150 MG/ML injection    DEPO-PROVERA    3 mL    Inject 1 mL (150 mg) into the muscle every 3 months    Encounter for surveillance of injectable contraceptive       predniSONE 20 MG tablet    DELTASONE    10 tablet    Take 1 tablet (20 mg) by mouth 2 times daily for 5 days    Throat pain, Throat swelling

## 2018-04-06 ASSESSMENT — ANXIETY QUESTIONNAIRES
1. FEELING NERVOUS, ANXIOUS, OR ON EDGE: NEARLY EVERY DAY
3. WORRYING TOO MUCH ABOUT DIFFERENT THINGS: MORE THAN HALF THE DAYS
5. BEING SO RESTLESS THAT IT IS HARD TO SIT STILL: NEARLY EVERY DAY
7. FEELING AFRAID AS IF SOMETHING AWFUL MIGHT HAPPEN: MORE THAN HALF THE DAYS
2. NOT BEING ABLE TO STOP OR CONTROL WORRYING: MORE THAN HALF THE DAYS
GAD7 TOTAL SCORE: 17
6. BECOMING EASILY ANNOYED OR IRRITABLE: MORE THAN HALF THE DAYS

## 2018-04-06 ASSESSMENT — PATIENT HEALTH QUESTIONNAIRE - PHQ9: 5. POOR APPETITE OR OVEREATING: NEARLY EVERY DAY

## 2018-04-07 ASSESSMENT — ANXIETY QUESTIONNAIRES: GAD7 TOTAL SCORE: 17

## 2018-04-07 ASSESSMENT — PATIENT HEALTH QUESTIONNAIRE - PHQ9: SUM OF ALL RESPONSES TO PHQ QUESTIONS 1-9: 19

## 2018-04-09 ENCOUNTER — TELEPHONE (OUTPATIENT)
Dept: INTERNAL MEDICINE | Facility: CLINIC | Age: 28
End: 2018-04-09

## 2018-04-09 NOTE — TELEPHONE ENCOUNTER
Pt called.  She stated that ins won't cover the Duloxetine.  She is wondering if there is another medication that she can try.

## 2018-04-09 NOTE — TELEPHONE ENCOUNTER
Prior Authorization Retail Medication Request    Medication/Dose: Duloxetine HCI  ICD code (if different than what is on RX):  F41.8, M79.7  Previously Tried and Failed:    Rationale:      Insurance Name:  [facility] # [phone]  Insurance ID:  [id number]      Pharmacy Information (if different than what is on RX)  Name:  [facility] [address] [place]  Phone:  [phone]

## 2018-04-12 NOTE — TELEPHONE ENCOUNTER
Central Prior Authorization Team   Phone: [phone]      PA Initiation    Medication: Duloxetine HCI-Initiated  Insurance Company: [employer] - Phone [phone] Fax [phone]  Pharmacy Filling the Rx: [facility] [address] [place], [place] - [address] [place] [facility] [address]  Filling Pharmacy Phone: [phone]  Filling Pharmacy Fax:    Start Date: 4/12/2018

## 2018-04-12 NOTE — TELEPHONE ENCOUNTER
Prior Authorization Approval    Authorization Effective Date: 4/11/2018  Authorization Expiration Date: 4/12/2019  Medication: Duloxetine HCI-APPROVED  Approved Dose/Quantity:   Reference #:     Insurance Company: [employer] - Phone [phone] Fax [phone]  Expected CoPay: $0.00     CoPay Card Available:      Foundation Assistance Needed:    Which Pharmacy is filling the prescription (Not needed for infusion/clinic administered): [facility] [address] [place] [address] [address] AT [facility] [place] [place] [address]  Pharmacy Notified: Yes  Patient Notified: Yes

## 2018-04-16 DIAGNOSIS — J45.20 INTERMITTENT ASTHMA, UNCOMPLICATED: ICD-10-CM

## 2018-04-17 RX ORDER — ALBUTEROL SULFATE 90 UG/1
AEROSOL, METERED RESPIRATORY (INHALATION)
Qty: 18 G | Refills: 0 | OUTPATIENT
Start: 2018-04-17

## 2018-04-17 NOTE — TELEPHONE ENCOUNTER
"Requested Prescriptions   Pending Prescriptions Disp Refills     VENTOLIN  (90 Base) MCG/ACT Inhaler [Pharmacy Med Name: VENTOLIN HFA INH W/DOS CTR 200PUFFS] 18 g 0     Sig: INHALE 2 PUFFS INTO THE LUNGS FOUR TIMES DAILY AS NEEDED FOR SHORTNESS OF BREATH OR DIFFICULTY BREATHING    Asthma Maintenance Inhalers - Anticholinergics Failed    4/16/2018  1:00 PM       Failed - Asthma control assessment score within normal limits in last 6 months    Please review ACT score.          Passed - Patient is age 12 years or older       Passed - Recent (6 mo) or future (30 days) visit within the authorizing provider's specialty    Patient had office visit in the last 6 months or has a visit in the next 30 days with authorizing provider or within the authorizing provider's specialty.  See \"Patient Info\" tab in inbasket, or \"Choose Columns\" in Meds & Orders section of the refill encounter.            Last Written Prescription Date:  4/5/18  Last Fill Quantity: 18,  # refills: 0   Last office visit: 3/14/2017 with prescribing provider:  3/14/17   Future Office Visit:      "

## 2018-04-25 ENCOUNTER — OFFICE VISIT (OUTPATIENT)
Dept: OBGYN | Facility: CLINIC | Age: 28
End: 2018-04-25
Payer: COMMERCIAL

## 2018-04-25 VITALS
SYSTOLIC BLOOD PRESSURE: 126 MMHG | HEIGHT: 67 IN | DIASTOLIC BLOOD PRESSURE: 86 MMHG | WEIGHT: 266 LBS | BODY MASS INDEX: 41.75 KG/M2

## 2018-04-25 DIAGNOSIS — Z30.430 ENCOUNTER FOR IUD INSERTION: Primary | ICD-10-CM

## 2018-04-25 LAB — BETA HCG QUAL IFA URINE: NEGATIVE

## 2018-04-25 PROCEDURE — 84703 CHORIONIC GONADOTROPIN ASSAY: CPT | Performed by: ADVANCED PRACTICE MIDWIFE

## 2018-04-25 PROCEDURE — 58300 INSERT INTRAUTERINE DEVICE: CPT | Performed by: ADVANCED PRACTICE MIDWIFE

## 2018-04-25 NOTE — MR AVS SNAPSHOT
"              After Visit Summary   4/25/2018    Erica Gordon    MRN: [id number]           Patient Information     Date Of Birth          1990        Visit Information        Provider Department      4/25/2018 1:30 PM Neisha Escoto APRN CNM [facility]        Today's Diagnoses     Encounter for IUD insertion    -  1       Follow-ups after your visit        Follow-up notes from your care team     Return in about 4 weeks (around 5/23/2018), or if symptoms worsen or fail to improve.      Your next 10 appointments already scheduled     Apr 30, 2018  3:00 PM CDT   SHORT with Charlene Culver MD   [facility] ([facility])    [address] [place] [id number] [phone]              Who to contact     If you have questions or need follow up information about today's clinic visit or your schedule please contact [facility] directly at [phone].  Normal or non-critical lab and imaging results will be communicated to you by [employer], letter or phone within 4 business days after the clinic has received the results. If you do not hear from us within 7 days, please contact the clinic through MyChart or phone. If you have a critical or abnormal lab result, we will notify you by phone as soon as possible.  Submit refill requests through [employer] or call your pharmacy and they will forward the refill request to us. Please allow 3 business days for your refill to be completed.          Additional Information About Your Visit        [employer] Information     [employer] lets you send messages to your doctor, view your test results, renew your prescriptions, schedule appointments and more. To sign up, go to [email or web address] . Click on \"Log in\" on the left side of the screen, which will take you to the Welcome page. Then click on \"Sign up Now\" on the right side of the page.     You will be asked to " "enter the access code listed below, as well as some personal information. Please follow the directions to create your username and password.     Your access code is: [email or web address]  Expires: 2018  2:42 PM     Your access code will  in 90 days. If you need help or a new code, please call your [place] clinic or [phone].        Care EveryWhere ID     This is your Care EveryWhere ID. This could be used by other organizations to access your [place] medical records  [id number]        Your Vitals Were     Height Last Period Breastfeeding? BMI (Body Mass Index)          5' 6.9\" (1.699 m) (LMP Unknown) No 41.79 kg/m2         Blood Pressure from Last 3 Encounters:   18 126/86   18 124/60   18 98/68    Weight from Last 3 Encounters:   18 266 lb (120.7 kg)   18 264 lb 3.2 oz (119.8 kg)   18 246 lb 1.6 oz (111.6 kg)              We Performed the Following     Beta HCG Qual, Urine - [facility] [place] ([id number])     HC LEVONORGESTREL IU 52MG 5 YR     INSERTION INTRAUTERINE DEVICE          Today's Medication Changes          These changes are accurate as of 18  2:42 PM.  If you have any questions, ask your nurse or doctor.               Start taking these medicines.        Dose/Directions    levonorgestrel 20 MCG/24HR IUD   Commonly known as:  MIRENA   Used for:  Encounter for IUD insertion   Started by:  Neisha Escoto APRN CNM        Dose:  1 each   1 each (20 mcg) by Intrauterine route continuous   Refills:  0            Where to get your medicines      Some of these will need a paper prescription and others can be bought over the counter.  Ask your nurse if you have questions.     You don't need a prescription for these medications     levonorgestrel 20 MCG/24HR IUD                Primary Care Provider Office Phone # Fax #    Charlene Culver [phone]       [address] [place] [address]        Equal Access to Services     CYRUS ZAMORA AH: " Hadii kellie ortiz haddennyo Soomaali, waaxda luqadaha, qaybta kaalmada felisha, moses teresitain hayaan mariacodey tanner lamehreennabeel elizabeth. So [facility] [phone].    ATENCIÓN: Si madison bernal, tiene a bassett disposición servicios gratuitos de asistencia lingüística. Llame al [phone].    We comply with applicable federal civil rights laws and [place] laws. We do not discriminate on the basis of race, color, national origin, age, disability, sex, sexual orientation, or gender identity.            Thank you!     Thank you for choosing [facility]  for your care. Our goal is always to provide you with excellent care. Hearing back from our patients is one way we can continue to improve our services. Please take a few minutes to complete the written survey that you may receive in the mail after your visit with us. Thank you!             Your Updated Medication List - Protect others around you: Learn how to safely use, store and throw away your medicines at www.disposemymeds.org.          This list is accurate as of 4/25/18  2:42 PM.  Always use your most recent med list.                   Brand Name Dispense Instructions for use Diagnosis    albuterol 108 (90 Base) MCG/ACT Inhaler    VENTOLIN HFA    18 g    Inhale 1-2 puffs into the lungs every 6 hours as needed for shortness of breath / dyspnea or wheezing    Mild persistent asthma without complication       DULoxetine 30 MG EC capsule    CYMBALTA    90 capsule    Take 1 capsule (30 mg) by mouth 2 times daily    Fibromyalgia, Anxiety and depression       FLUoxetine 20 MG capsule    PROzac    90 capsule    Take 1 capsule (20 mg) by mouth daily    Anxiety and depression       levonorgestrel 20 MCG/24HR IUD    MIRENA     1 each (20 mcg) by Intrauterine route continuous    Encounter for IUD insertion       medroxyPROGESTERone 150 MG/ML injection    DEPO-PROVERA    3 mL    Inject 1 mL (150 mg) into the muscle every 3 months    Encounter for surveillance of injectable  contraceptive

## 2018-04-25 NOTE — NURSING NOTE
"Chief Complaint   Patient presents with     Consult     Contraception       Initial /86  Ht 5' 6.9\" (1.699 m)  Wt 266 lb (120.7 kg)  LMP  (LMP Unknown)  Breastfeeding? No  BMI 41.79 kg/m2 Estimated body mass index is 41.79 kg/(m^2) as calculated from the following:    Height as of this encounter: 5' 6.9\" (1.699 m).    Weight as of this encounter: 266 lb (120.7 kg).  Medication Reconciliation: complete     Marija Soni, GREGORIO      "

## 2018-04-25 NOTE — PROGRESS NOTES
"SUBJECTIVE:  Erica Gordon is a 27 year old woman here for contraceptive management.  HPI: Was on depo, doesn't like side effects including weight gain.  Seen on  by Dr. Culver, reviewed IUD risks and benefits.      MENSTRUAL HISTORY  ==================    No LMP recorded (lmp unknown). Patient has had an injection.    Periods are rare on Depo,  Dysmenorrhea none. Cyclic symptoms include  weight gain. Additional symptoms include NONE    SEXUAL HISTORY  ==============  Current contraception: depoprovera  ACHES reviewed and WNL: yes  Number of partners in last year: 1  Pain with intercourse:No  Bleeding with intercourse:No  History of STD/ Vaginal infections:No STD history  Urinary symptoms:no    HISTORIES  =========  Medical, surgical, and family histories as well as medications and allergies reviewed and updated in this encounter.    EXAM  =========  /86  Ht 5' 6.9\" (1.699 m)  Wt 266 lb (120.7 kg)  LMP  (LMP Unknown)  Breastfeeding? No  BMI 41.79 kg/m2      INDICATIONS:                                                      Is a pregnancy test required: Yes.  Was it positive or negative?  Negative  Was a consent obtained?  Yes    Erica Gordon is a 27 year old female,   who presents for insertion of an IUD. The risks, benefits and alternatives of IUD insertion were discussed in detail previously. She also has reviewed the product brochure.  She has elected to go ahead with the insertion  today and her questions were answered. Consent was signed. Her LMP began on uncertain, no menses for 2 years and was normal in duration and amount of flow. A pregnancy test was performed today:  Yes    Today's PHQ-2 Score:   PHQ-2 (  [employer]) 2016   Q1: Little interest or pleasure in doing things 0   Q2: Feeling down, depressed or hopeless 0   PHQ-2 Score 0       PROCEDURE:                                                      The pelvic exam revealed normal external genitalia. On bimanual exam " the uterus was Anteverted and normal in size with no tenderness present. A speculum was inserted into the vagina and the cervix was visualized. The cervix was prepped with Betadine . The anterior lip of the cervix was grasped with a single toothed tenaculum. The uterus sounded to 7.5 cm. A Mirena IUD was then inserted without difficulty. The string was cut to 3 cm.  The patient experienced a mild amount of cramping.    POST PROCEDURE:                                                      She  tolerated the procedure well. There were no complications. Patient was discharged in stable condition.    Return to clinic in 5 weeks for IUD check.  Call if severe cramping, fever, abnormal bleeding, abnormal discharge or pelvic pain develop.  Patient was counseled about the chance of irregular bleeding.      ASSESSMENT/PLAN    (Z30.430) Encounter for IUD insertion  (primary encounter diagnosis)  Comment: tolerated well  Plan: Beta HCG Qual, Urine - [facility] [place]         ([id number]), HC LEVONORGESTREL IU 52MG 5 YR,         levonorgestrel (MIRENA) 20 MCG/24HR IUD,         INSERTION INTRAUTERINE DEVICE            PATIENT EDUCATION  =================  1.  Discussed with patient risks/ benefits and treatment options of prescribed medications or other treatment modalities.  2.  Discussed STD/ safe sex precautions.  3.  RTC in one year for annual exam or with concerns.        IRWIN Govea, ALEXEI

## 2018-04-30 ENCOUNTER — OFFICE VISIT (OUTPATIENT)
Dept: INTERNAL MEDICINE | Facility: CLINIC | Age: 28
End: 2018-04-30
Payer: COMMERCIAL

## 2018-04-30 VITALS
BODY MASS INDEX: 42.06 KG/M2 | HEIGHT: 67 IN | RESPIRATION RATE: 20 BRPM | TEMPERATURE: 99.1 F | WEIGHT: 268 LBS | OXYGEN SATURATION: 98 % | DIASTOLIC BLOOD PRESSURE: 66 MMHG | SYSTOLIC BLOOD PRESSURE: 122 MMHG | HEART RATE: 90 BPM

## 2018-04-30 DIAGNOSIS — F41.9 ANXIETY AND DEPRESSION: Primary | ICD-10-CM

## 2018-04-30 DIAGNOSIS — F32.A ANXIETY AND DEPRESSION: Primary | ICD-10-CM

## 2018-04-30 PROBLEM — E66.01 MORBID OBESITY (H): Status: ACTIVE | Noted: 2018-04-30

## 2018-04-30 PROCEDURE — 99213 OFFICE O/P EST LOW 20 MIN: CPT | Performed by: INTERNAL MEDICINE

## 2018-04-30 RX ORDER — FLUOXETINE 40 MG/1
40 CAPSULE ORAL DAILY
Qty: 30 CAPSULE | Refills: 1 | Status: SHIPPED | OUTPATIENT
Start: 2018-04-30 | End: 2018-06-05

## 2018-04-30 ASSESSMENT — ANXIETY QUESTIONNAIRES
1. FEELING NERVOUS, ANXIOUS, OR ON EDGE: NEARLY EVERY DAY
GAD7 TOTAL SCORE: 20
2. NOT BEING ABLE TO STOP OR CONTROL WORRYING: NEARLY EVERY DAY
5. BEING SO RESTLESS THAT IT IS HARD TO SIT STILL: NEARLY EVERY DAY
7. FEELING AFRAID AS IF SOMETHING AWFUL MIGHT HAPPEN: MORE THAN HALF THE DAYS
6. BECOMING EASILY ANNOYED OR IRRITABLE: NEARLY EVERY DAY
3. WORRYING TOO MUCH ABOUT DIFFERENT THINGS: NEARLY EVERY DAY

## 2018-04-30 ASSESSMENT — PAIN SCALES - GENERAL: PAINLEVEL: EXTREME PAIN (8)

## 2018-04-30 ASSESSMENT — PATIENT HEALTH QUESTIONNAIRE - PHQ9: 5. POOR APPETITE OR OVEREATING: NEARLY EVERY DAY

## 2018-04-30 NOTE — PATIENT INSTRUCTIONS
STOP Cymbalta.    INCREASE Prozac from 20 to 40mg.    If no improvement with this, we can consider adding Wellbutrin.    Follow-up with me in ~6 weeks (earlier as needed).

## 2018-04-30 NOTE — MR AVS SNAPSHOT
"              After Visit Summary   4/30/2018    Erica Gordon    MRN: [id number]           Patient Information     Date Of Birth          1990        Visit Information        Provider Department      4/30/2018 3:00 PM Charlene Culver MD [facility]        Today's Diagnoses     Anxiety and depression    -  1      Care Instructions    STOP Cymbalta.    INCREASE Prozac from 20 to 40mg.    If no improvement with this, we can consider adding Wellbutrin.    Follow-up with me in ~6 weeks (earlier as needed).           Follow-ups after your visit        Who to contact     If you have questions or need follow up information about today's clinic visit or your schedule please contact [facility] directly at [phone].  Normal or non-critical lab and imaging results will be communicated to you by MyChart, letter or phone within 4 business days after the clinic has received the results. If you do not hear from us within 7 days, please contact the clinic through MyChart or phone. If you have a critical or abnormal lab result, we will notify you by phone as soon as possible.  Submit refill requests through [employer] or call your pharmacy and they will forward the refill request to us. Please allow 3 business days for your refill to be completed.          Additional Information About Your Visit        MyChart Information     [employer] lets you send messages to your doctor, view your test results, renew your prescriptions, schedule appointments and more. To sign up, go to [email or web address] . Click on \"Log in\" on the left side of the screen, which will take you to the Welcome page. Then click on \"Sign up Now\" on the right side of the page.     You will be asked to enter the access code listed below, as well as some personal information. Please follow the directions to create your username and password.     Your access code is: [email or web address]  Expires: 7/24/2018  2:42 PM   " "  Your access code will  in 90 days. If you need help or a new code, please call your [place] clinic or [phone].        Care EveryWhere ID     This is your Care EveryWhere ID. This could be used by other organizations to access your [place] medical records  [id number]        Your Vitals Were     Pulse Temperature Respirations Height Last Period Pulse Oximetry    90 99.1  F (37.3  C) (Oral) 20 5' 6.9\" (1.699 m) (LMP Unknown) 98%    BMI (Body Mass Index)                   42.1 kg/m2            Blood Pressure from Last 3 Encounters:   18 122/66   18 126/86   18 124/60    Weight from Last 3 Encounters:   18 268 lb (121.6 kg)   18 266 lb (120.7 kg)   18 264 lb 3.2 oz (119.8 kg)              Today, you had the following     No orders found for display         Today's Medication Changes          These changes are accurate as of 18  3:14 PM.  If you have any questions, ask your nurse or doctor.               Start taking these medicines.        Dose/Directions    FLUoxetine 40 MG capsule   Commonly known as:  PROzac   Used for:  Anxiety and depression   Started by:  Charlene Culver MD        Dose:  40 mg   Take 1 capsule (40 mg) by mouth daily   Quantity:  30 capsule   Refills:  1         Stop taking these medicines if you haven't already. Please contact your care team if you have questions.     medroxyPROGESTERone 150 MG/ML injection   Commonly known as:  DEPO-PROVERA   Stopped by:  Charlene Culver MD                Where to get your medicines      These medications were sent to [id number] - [facility] [address] AT [facility] & [address], [facility] [address]     Phone:  [phone]     FLUoxetine 40 MG capsule                Primary Care Provider Office Phone # Fax #    Charlene Culver [phone]       [address]  [facility] [place] [address]        Equal Access to Services     CYRUS ZAMORA AH: " Hadii kellie Christian, waaxda luqadaha, qaybta kaalbriseyda baeza, moses trisha trudynabeel sifuentescodey eduardsmith lamehreennabeel elizabeth. So [facility] [phone].    ATENCIÓN: Si musala gabe, tiene a bassett disposición servicios gratuitos de asistencia lingüística. Llame al [phone].    We comply with applicable federal civil rights laws and [place] laws. We do not discriminate on the basis of race, color, national origin, age, disability, sex, sexual orientation, or gender identity.            Thank you!     Thank you for choosing [facility]  for your care. Our goal is always to provide you with excellent care. Hearing back from our patients is one way we can continue to improve our services. Please take a few minutes to complete the written survey that you may receive in the mail after your visit with us. Thank you!             Your Updated Medication List - Protect others around you: Learn how to safely use, store and throw away your medicines at www.disposemymeds.org.          This list is accurate as of 4/30/18  3:14 PM.  Always use your most recent med list.                   Brand Name Dispense Instructions for use Diagnosis    albuterol 108 (90 Base) MCG/ACT Inhaler    VENTOLIN HFA    18 g    Inhale 1-2 puffs into the lungs every 6 hours as needed for shortness of breath / dyspnea or wheezing    Mild persistent asthma without complication       FLUoxetine 40 MG capsule    PROzac    30 capsule    Take 1 capsule (40 mg) by mouth daily    Anxiety and depression       levonorgestrel 20 MCG/24HR IUD    MIRENA     1 each (20 mcg) by Intrauterine route continuous    Encounter for IUD insertion

## 2018-04-30 NOTE — PROGRESS NOTES
"  SUBJECTIVE:                                                      HPI: Erica Gordon is a pleasant 27 year old female who presents for follow-up:    Was seen earlier this month for depression and anxiety. At that time depression was well-controlled with Prozac 20mg daily but anxiety was suboptimally controlled. Patient did not want to increase Prozac at that time. Cymbalta 30mg was added to address anxiety as well as fibromyalgia, which she also has and which was going untreated.    Patient has tried Buspar before and not tolerated it.     Since starting the Cymbalta patient reports worsening anxiety and fibromyalgia pain.     She is not interested in meeting with a psychiatrist to discuss alternatives.    She is not interested in meeting with a therapist at this time.    Suggested increasing Prozac now that we have fewer options. Patient initially declines and requests a prescription for Xanax. Discussed the addictive and impairing nature of Xanax with patient. Also Xanax does address underlying chemistry as well as daily controller medications. Xanax prescription declined after which patient was agreeable to trying an increased dose of Prozac.     The medication, allergy, and problem lists have been reviewed and updated as appropriate.       OBJECTIVE:                                                      /66 (BP Location: Left arm, Patient Position: Chair, Cuff Size: Adult Large)  Pulse 90  Temp 99.1  F (37.3  C) (Oral)  Resp 20  Ht 5' 6.9\" (1.699 m)  Wt 268 lb (121.6 kg)  LMP  (LMP Unknown)  SpO2 98%  BMI 42.1 kg/m2  Constitutional: well-appearing  Psych: normal judgment and insight; anxious mood and affect; recent and remote memory intact      ASSESSMENT/PLAN:                                                      (F41.8) Anxiety and depression  (primary encounter diagnosis)  Comment: worsening anxiety and fibromyalgia pain with addition of Cymbalta several weeks ago.  Plan:    - STOP Cymbalta " (no taper needed as patient has only been on it for several weeks).   - INCREASE Prozac from 20 to 40 mg daily.   - follow-up in 6 weeks (earlier as needed).   - could consider addition of Wellbutrin to regimen if needed.     The instructions on the AVS were discussed and explained to the patient. Patient expressed understanding of instructions.    (Chart documentation was completed, in part, with [employer] voice-recognition software. Even though reviewed, some grammatical, spelling, and word errors may remain.)    Charlene Culver MD   [address] [address]  T: [phone], F: [phone]

## 2018-05-01 ASSESSMENT — ANXIETY QUESTIONNAIRES: GAD7 TOTAL SCORE: 20

## 2018-05-04 DIAGNOSIS — J45.30 MILD PERSISTENT ASTHMA WITHOUT COMPLICATION: ICD-10-CM

## 2018-05-04 NOTE — TELEPHONE ENCOUNTER
"Requested Prescriptions   Pending Prescriptions Disp Refills     VENTOLIN  (90 Base) MCG/ACT Inhaler [Pharmacy Med Name: VENTOLIN HFA INH W/DOS CTR 200PUFFS] 18 g 0     Sig: INHALE 1 TO 2 PUFFS INTO THE LUNGS EVERY 6 HOURS AS NEEDED FOR SHORTNESS OF BREATH OR DIFFICULT BREATHING OR WHEEZING    Asthma Maintenance Inhalers - Anticholinergics Failed    5/4/2018  1:43 PM       Failed - Asthma control assessment score within normal limits in last 6 months    Please review ACT score.          Passed - Patient is age 12 years or older       Passed - Recent (6 mo) or future (30 days) visit within the authorizing provider's specialty    Patient had office visit in the last 6 months or has a visit in the next 30 days with authorizing provider or within the authorizing provider's specialty.  See \"Patient Info\" tab in inbasket, or \"Choose Columns\" in Meds & Orders section of the refill encounter.            Last Written Prescription Date:  4/5/18  Last Fill Quantity: 18g,  # refills: 0   Last office visit: 4/30/2018 with prescribing provider:  4/30/18   Future Office Visit:      ACT Total Scores 4/14/2016 7/14/2016 2/7/2017   ACT TOTAL SCORE - - -   ASTHMA ER VISITS - - -   ASTHMA HOSPITALIZATIONS - - -   ACT TOTAL SCORE (Goal Greater than or Equal to 20) 19 20 20   In the past 12 months, how many times did you visit the emergency room for your asthma without being admitted to the hospital? 0 0 0   In the past 12 months, how many times were you hospitalized overnight because of your asthma? 0 0 0         "

## 2018-05-04 NOTE — TELEPHONE ENCOUNTER
Routing refill request to provider for review/approval because:  Last ACT done >1 year ago  Assess frequency of use

## 2018-05-05 RX ORDER — ALBUTEROL SULFATE 90 UG/1
AEROSOL, METERED RESPIRATORY (INHALATION)
Qty: 18 G | Refills: 0 | Status: SHIPPED | OUTPATIENT
Start: 2018-05-05 | End: 2018-05-23

## 2018-05-23 DIAGNOSIS — J45.30 MILD PERSISTENT ASTHMA WITHOUT COMPLICATION: ICD-10-CM

## 2018-05-23 RX ORDER — ALBUTEROL SULFATE 90 UG/1
AEROSOL, METERED RESPIRATORY (INHALATION)
Qty: 18 G | Refills: 0 | Status: SHIPPED | OUTPATIENT
Start: 2018-05-23 | End: 2018-06-21

## 2018-05-23 NOTE — TELEPHONE ENCOUNTER
ACT Total Scores 4/14/2016 7/14/2016 2/7/2017   ACT TOTAL SCORE - - -   ASTHMA ER VISITS - - -   ASTHMA HOSPITALIZATIONS - - -   ACT TOTAL SCORE (Goal Greater than or Equal to 20) 19 20 20   In the past 12 months, how many times did you visit the emergency room for your asthma without being admitted to the hospital? 0 0 0   In the past 12 months, how many times were you hospitalized overnight because of your asthma? 0 0 0     Routing refill request to provider for review/approval because:  not current:  ACT

## 2018-05-23 NOTE — TELEPHONE ENCOUNTER
"Requested Prescriptions   Pending Prescriptions Disp Refills     VENTOLIN  (90 Base) MCG/ACT Inhaler [Pharmacy Med Name: VENTOLIN HFA INH W/DOS CTR 200PUFFS]  Last Written Prescription Date:  05/05/2018  Last Fill Quantity: 18g ,  # refills: 0  Last office visit: 4/30/2018 with prescribing provider:    Future Office Visit:     18 g 0     Sig: INHALE 1 TO 2 PUFFS INTO THE LUNGS EVERY 6 HOURS AS NEEDED FOR SHORTNESS OF BREATH OR DIFFICULT BREATHING OR WHEEZING    Asthma Maintenance Inhalers - Anticholinergics Failed    5/23/2018 12:23 PM       Failed - Asthma control assessment score within normal limits in last 6 months    Please review ACT score.          Passed - Patient is age 12 years or older       Passed - Recent (6 mo) or future (30 days) visit within the authorizing provider's specialty    Patient had office visit in the last 6 months or has a visit in the next 30 days with authorizing provider or within the authorizing provider's specialty.  See \"Patient Info\" tab in inbasket, or \"Choose Columns\" in Meds & Orders section of the refill encounter.              "

## 2018-05-25 ENCOUNTER — TELEPHONE (OUTPATIENT)
Dept: INTERNAL MEDICINE | Facility: CLINIC | Age: 28
End: 2018-05-25

## 2018-06-05 DIAGNOSIS — F41.9 ANXIETY AND DEPRESSION: ICD-10-CM

## 2018-06-05 DIAGNOSIS — F32.A ANXIETY AND DEPRESSION: ICD-10-CM

## 2018-06-05 RX ORDER — FLUOXETINE 40 MG/1
CAPSULE ORAL
Qty: 30 CAPSULE | Refills: 0 | Status: SHIPPED | OUTPATIENT
Start: 2018-06-05 | End: 2018-06-25

## 2018-06-05 NOTE — TELEPHONE ENCOUNTER
"Requested Prescriptions   Pending Prescriptions Disp Refills     FLUoxetine (PROZAC) 40 MG capsule [Pharmacy Med Name: FLUOXETINE 40MG CAPSULES] 30 capsule 0     Sig: TAKE 1 CAPSULE(40 MG) BY MOUTH DAILY    SSRIs Protocol Passed    6/5/2018 11:14 AM       Passed - Recent (12 mo) or future (30 days) visit within the authorizing provider's specialty    Patient had office visit in the last 12 months or has a visit in the next 30 days with authorizing provider or within the authorizing provider's specialty.  See \"Patient Info\" tab in inbasket, or \"Choose Columns\" in Meds & Orders section of the refill encounter.           Passed - Patient is age 18 or older       Passed - No active pregnancy on record       Passed - No positive pregnancy test in last 12 months        PHQ-9 SCORE 3/14/2017 1/8/2018 4/6/2018   Total Score - - -   Total Score 10 16 19     Routing refill request to provider for review/approval because:   out of range:  phq9 >4        "

## 2018-06-15 DIAGNOSIS — J45.30 MILD PERSISTENT ASTHMA WITHOUT COMPLICATION: ICD-10-CM

## 2018-06-15 RX ORDER — ALBUTEROL SULFATE 90 UG/1
AEROSOL, METERED RESPIRATORY (INHALATION)
Qty: 18 G | Refills: 0 | OUTPATIENT
Start: 2018-06-15

## 2018-06-15 NOTE — TELEPHONE ENCOUNTER
"Requested Prescriptions   Pending Prescriptions Disp Refills     VENTOLIN  (90 Base) MCG/ACT Inhaler [Pharmacy Med Name: VENTOLIN HFA INH W/DOS CTR [id number]] 18 g 0          Last Written Prescription Date:  05/23/18  Last Fill Quantity: 18g,  # refills: 0   Last office visit: 4/30/2018 with prescribing provider:  04/30/18   Future Office Visit:  0  ACT Total Scores 4/14/2016 7/14/2016 2/7/2017   ACT TOTAL SCORE - - -   ASTHMA ER VISITS - - -   ASTHMA HOSPITALIZATIONS - - -   ACT TOTAL SCORE (Goal Greater than or Equal to 20) 19 20 20   In the past 12 months, how many times did you visit the emergency room for your asthma without being admitted to the hospital? 0 0 0   In the past 12 months, how many times were you hospitalized overnight because of your asthma? 0 0 0    Sig: INHALE 1 TO 2 PUFFS INTO THE LUNGS EVERY 6 HOURS AS NEEDED FOR SHORTNESS OF BREATH OR DIFFICULT BREATHING OR WHEEZING    Asthma Maintenance Inhalers - Anticholinergics Failed    6/15/2018  4:07 PM       Failed - Asthma control assessment score within normal limits in last 6 months    Please review ACT score.          Passed - Patient is age 12 years or older       Passed - Recent (6 mo) or future (30 days) visit within the authorizing provider's specialty    Patient had office visit in the last 6 months or has a visit in the next 30 days with authorizing provider or within the authorizing provider's specialty.  See \"Patient Info\" tab in inbasket, or \"Choose Columns\" in Meds & Orders section of the refill encounter.            "

## 2018-06-16 NOTE — TELEPHONE ENCOUNTER
Patient going through albuterol inhalers too quickly. Indicates poor control of asthma. We should discuss better treatment options.

## 2018-06-19 NOTE — TELEPHONE ENCOUNTER
Unfortunately, this frequency of use is still too often. Albuterol is only meant as a rescue medication - used, at most, once a week (and hopefully less than that).     OV still recommended.

## 2018-06-19 NOTE — TELEPHONE ENCOUNTER
Informed pt of msg below    Pt said that the most recent RF request came thru because she just lost her most recent inhaler. She uses the albuterol inhaler once to twice a day. Should pt still be seen? Please advise.

## 2018-06-21 RX ORDER — ALBUTEROL SULFATE 90 UG/1
1-2 AEROSOL, METERED RESPIRATORY (INHALATION) EVERY 4 HOURS PRN
Qty: 18 G | Refills: 0 | Status: SHIPPED | OUTPATIENT
Start: 2018-06-21 | End: 2019-04-04

## 2018-06-21 NOTE — TELEPHONE ENCOUNTER
Patient stated understanding.  Scheduled office visit with PCP on 6/25/18.  Denies SOB, breathing difficulty, or wheezing.  Requesting refill of inhaler for over weekend until seen on Monday.

## 2018-06-21 NOTE — TELEPHONE ENCOUNTER
Left message to call back as CTC is not valid.   Marija Kearns, GREGORIO on 6/21/2018 at 10:50 AM

## 2018-06-25 ENCOUNTER — OFFICE VISIT (OUTPATIENT)
Dept: INTERNAL MEDICINE | Facility: CLINIC | Age: 28
End: 2018-06-25
Payer: COMMERCIAL

## 2018-06-25 ENCOUNTER — OFFICE VISIT (OUTPATIENT)
Dept: OBGYN | Facility: CLINIC | Age: 28
End: 2018-06-25
Payer: COMMERCIAL

## 2018-06-25 ENCOUNTER — TELEPHONE (OUTPATIENT)
Dept: OBGYN | Facility: CLINIC | Age: 28
End: 2018-06-25

## 2018-06-25 VITALS
TEMPERATURE: 98.6 F | DIASTOLIC BLOOD PRESSURE: 84 MMHG | BODY MASS INDEX: 43.55 KG/M2 | HEART RATE: 78 BPM | RESPIRATION RATE: 20 BRPM | OXYGEN SATURATION: 98 % | WEIGHT: 277.2 LBS | SYSTOLIC BLOOD PRESSURE: 120 MMHG

## 2018-06-25 VITALS
HEIGHT: 67 IN | SYSTOLIC BLOOD PRESSURE: 130 MMHG | BODY MASS INDEX: 43.16 KG/M2 | WEIGHT: 275 LBS | DIASTOLIC BLOOD PRESSURE: 92 MMHG

## 2018-06-25 DIAGNOSIS — J45.40 MODERATE PERSISTENT ASTHMA WITHOUT COMPLICATION: Primary | ICD-10-CM

## 2018-06-25 DIAGNOSIS — F33.2 SEVERE EPISODE OF RECURRENT MAJOR DEPRESSIVE DISORDER, WITHOUT PSYCHOTIC FEATURES (H): ICD-10-CM

## 2018-06-25 DIAGNOSIS — F41.0 PANIC ATTACK: ICD-10-CM

## 2018-06-25 DIAGNOSIS — T83.32XA INTRAUTERINE CONTRACEPTIVE DEVICE THREADS LOST, INITIAL ENCOUNTER: Primary | ICD-10-CM

## 2018-06-25 PROCEDURE — 99213 OFFICE O/P EST LOW 20 MIN: CPT | Performed by: ADVANCED PRACTICE MIDWIFE

## 2018-06-25 PROCEDURE — 99214 OFFICE O/P EST MOD 30 MIN: CPT | Performed by: INTERNAL MEDICINE

## 2018-06-25 RX ORDER — ALPRAZOLAM 0.5 MG
0.5 TABLET ORAL DAILY PRN
Qty: 30 TABLET | Refills: 0 | Status: SHIPPED | OUTPATIENT
Start: 2018-06-25 | End: 2018-09-27

## 2018-06-25 RX ORDER — FLUOXETINE 40 MG/1
80 CAPSULE ORAL DAILY
Qty: 60 CAPSULE | Refills: 1 | Status: SHIPPED | OUTPATIENT
Start: 2018-06-25 | End: 2018-08-12

## 2018-06-25 RX ORDER — QUETIAPINE FUMARATE 25 MG/1
25 TABLET, FILM COATED ORAL
Qty: 30 TABLET | Refills: 1 | Status: SHIPPED | OUTPATIENT
Start: 2018-06-25 | End: 2018-08-12

## 2018-06-25 RX ORDER — ALPRAZOLAM 0.5 MG
0.5 TABLET ORAL DAILY PRN
Qty: 30 TABLET | Refills: 0 | Status: SHIPPED | OUTPATIENT
Start: 2018-06-25 | End: 2018-06-25

## 2018-06-25 NOTE — NURSING NOTE
"Chief Complaint   Patient presents with     Follow Up For     Recheck IUD     Unable to feel her IUD strings.    Initial BP (!) 130/92  Ht 5' 6.9\" (1.699 m)  Wt 275 lb (124.7 kg)  BMI 43.2 kg/m2 Estimated body mass index is 43.2 kg/(m^2) as calculated from the following:    Height as of this encounter: 5' 6.9\" (1.699 m).    Weight as of this encounter: 275 lb (124.7 kg).  BP completed using cuff size: large        Marija Soni CMA             "

## 2018-06-25 NOTE — PROGRESS NOTES
SUBJECTIVE:                                                      HPI: Erica Gordon is a pleasant 27 year old female who presents for asthma follow-up:    Patient was asked to come in because of her frequent refills of albuterol (~1 refill/month).    Her ACT score is 11/25 indicating poor control of asthma.    She has never been on a controller medication for her asthma.    Patient continues to smoke.    Patient also reports significantly worsening depression over the last several weeks. Has been crying a lot. Having difficulty getting out of bed. Under- or overeating.  Having difficulty sleeping.  No motivation to do anything. Not enjoying anything. Feeling guilty about not wanting to get out of bed, being a mother. Associated panic attacks.    PHQ-9 score is 26/27. Passive SI, but no active thoughts or plans.    Currently on Prozac 40 mg daily. Reports being on higher doses of Prozac in the past.    Reports history of suicidal thoughts with Wellbutrin. Grandmother is on Zoloft, but is still very goldman on this medication.    Patient has used Seroquel in the past for sleep with good effect.    Patient has used Xanax in the past for panic attacks with good effect.    Interested in seeing a therapist.    The medication, allergy, and problem lists have been reviewed and updated as appropriate.       OBJECTIVE:                                                      /84  Pulse 78  Temp 98.6  F (37  C) (Oral)  Resp 20  Wt 277 lb 3.2 oz (125.7 kg)  SpO2 98%  BMI 43.55 kg/m2  Constitutional: well-appearing  Psych: normal judgment and insight; depressed mood and flattened affect; recent and remote memory intact      ASSESSMENT/PLAN:                                                      (J45.40) Moderate persistent asthma without complication  (primary encounter diagnosis)  Plan:    - START beclomethasone 80mcg - 2 puffs twice a day EVERY DAY/CONSISTENTLY.   - albuterol to be used SPARINGLY as needed.   - as  always, patient encouraged to stop smoking.   - follow-up in 6-8 weeks (earlier as needed).     (F33.2) Severe episode of recurrent major depressive disorder, without psychotic features (H)  (F41.0) Panic attack  Plan:    - INCREASE Prozac from 40 to 80mg daily.   - START Seroquel 12.5-25mg qhs for sleep.   - Xanax 0.5mg SPARINGLY as needed for panic attacks.   - referred for counseling - will be contacted to schedule.    - follow-up in 6-8 weeks (earlier as needed).     The instructions on the AVS were discussed and explained to the patient. Patient expressed understanding of instructions.    (Chart documentation was completed, in part, with [employer] voice-recognition software. Even though reviewed, some grammatical, spelling, and word errors may remain.)    Charlene Culver MD   [address] [address]  T: [phone], F: [phone]

## 2018-06-25 NOTE — PATIENT INSTRUCTIONS
I referred you to therapy - they will call to schedule.    START higher dose of Prozac: 80mg daily (new prescription sent in)    START Seroquel 25mg at night (maybe start with a half tab to make sure you don't get too sleepy).    Xanax SPARINGLY as needed for extreme anxiety/panic attacks. Do not drive after taking this. Warning: this is habit-forming/addictive.    ---    For asthma:    Beclomethasone inhaler - 2 puffs TWICE a day EVERY DAY.    Use albuterol inhaler sparingly.    ---    Follow-up in 6-8 weeks (earlier as needed).

## 2018-06-25 NOTE — MR AVS SNAPSHOT
After Visit Summary   6/25/2018    Erica Gordon    MRN: [id number]           Patient Information     Date Of Birth          1990        Visit Information        Provider Department      6/25/2018 4:00 PM Kirstin Harry CNM [facility]        Today's Diagnoses     Intrauterine contraceptive device threads lost, initial encounter    -  1       Follow-ups after your visit        Your next 10 appointments already scheduled     Jul 06, 2018  2:00 PM CDT   US PELVIC COMPLETE W TRANSVAGINAL with [id number]   [facility] ([facility]    [address] [address] [phone]           Please bring a list of your medicines (including vitamins, minerals and over-the-counter drugs). Also, tell your doctor about any allergies you may have. Wear comfortable clothes and leave your valuables at home.  Adults: Drink six 8-ounce glasses of fluid one hour before your exam. Do NOT empty your bladder.  If you need to empty your bladder before your exam, try to release only a little bit of urine. Then, drink another 8oz glass of fluid.  Children: Children who are potty trained should drink at least 4 cups (32 oz) of liquid 45 minutes to one hour prior to the exam. The child s bladder must be full in order to achieve a diagnostic exam. If your child is very uncomfortable or has an urgent need to pee, please notify a technologist; they will try to find out how much longer the wait may be and provide instructions to help relieve the pressure. Occasionally it is medically necessary to insert a urinary catheter to fill the bladder.  Please call the Imaging Department at your exam site with any questions.              Future tests that were ordered for you today     Open Future Orders        Priority Expected Expires Ordered    US Pelvic Complete w Transvaginal Routine 6/27/2018 6/25/2019 6/25/2018            Who to  "contact     If you have questions or need follow up information about today's clinic visit or your schedule please contact [facility] directly at [phone].  Normal or non-critical lab and imaging results will be communicated to you by MyChart, letter or phone within 4 business days after the clinic has received the results. If you do not hear from us within 7 days, please contact the clinic through MyChart or phone. If you have a critical or abnormal lab result, we will notify you by phone as soon as possible.  Submit refill requests through [employer] or call your pharmacy and they will forward the refill request to us. Please allow 3 business days for your refill to be completed.          Additional Information About Your Visit        Care EveryWhere ID     This is your Care EveryWhere ID. This could be used by other organizations to access your [place] medical records  [id number]        Your Vitals Were     Height BMI (Body Mass Index)                5' 6.9\" (1.699 m) 43.2 kg/m2           Blood Pressure from Last 3 Encounters:   06/25/18 (!) 130/92   06/25/18 120/84   04/30/18 122/66    Weight from Last 3 Encounters:   06/25/18 275 lb (124.7 kg)   06/25/18 277 lb 3.2 oz (125.7 kg)   04/30/18 268 lb (121.6 kg)                 Today's Medication Changes          These changes are accurate as of 6/25/18  4:41 PM.  If you have any questions, ask your nurse or doctor.               Start taking these medicines.        Dose/Directions    ALPRAZolam 0.5 MG tablet   Commonly known as:  XANAX   Used for:  Panic attack   Started by:  Charlene Culver MD        Dose:  0.5 mg   Take 1 tablet (0.5 mg) by mouth daily as needed for anxiety   Quantity:  30 tablet   Refills:  0       beclomethasone HFA 80 MCG/ACT Aerb inhaler   Commonly known as:  QVAR REDIHALER   Used for:  Moderate persistent asthma without complication   Started by:  Charlene Culver MD        Dose:  2 puff   Inhale 2 puffs " into the lungs 2 times daily   Quantity:  1 Inhaler   Refills:  3       QUEtiapine 25 MG tablet   Commonly known as:  SEROQUEL   Used for:  Severe episode of recurrent major depressive disorder, without psychotic features (H)   Started by:  Charlene Culver MD        Dose:  25 mg   Take 1 tablet (25 mg) by mouth nightly as needed   Quantity:  30 tablet   Refills:  1         These medicines have changed or have updated prescriptions.        Dose/Directions    FLUoxetine 40 MG capsule   Commonly known as:  PROzac   This may have changed:    - medication strength  - how much to take  - how to take this  - when to take this   Used for:  Severe episode of recurrent major depressive disorder, without psychotic features (H)   Changed by:  Charlene Culver MD        Dose:  80 mg   Take 2 capsules (80 mg) by mouth daily   Quantity:  60 capsule   Refills:  1            Where to get your medicines      These medications were sent to [facility] [id number] [place] [address] [address] AT [place] [address], [facility] [address]     Phone:  [phone]     beclomethasone HFA 80 MCG/ACT Aerb inhaler    FLUoxetine 40 MG capsule    QUEtiapine 25 MG tablet         Some of these will need a paper prescription and others can be bought over the counter.  Ask your nurse if you have questions.     Bring a paper prescription for each of these medications     ALPRAZolam 0.5 MG tablet                Primary Care Provider Office Phone # Fax #    Charlene Culver [phone]       [address] [facility] [address]        Equal Access to Services     CYRUS ZAMORA AH: Hadii kellie ortiz hadasho Soomaali, waaxda luqadaha, qaybta kaalmada adeegyada, [facility]. So [facility] [phone].    ATENCIÓN: Si habla español, tiene a bassett disposición servicios gratuitos de asistencia lingüística. Llame al [phone].    We comply with applicable federal civil rights laws  and [place] laws. We do not discriminate on the basis of race, color, national origin, age, disability, sex, sexual orientation, or gender identity.            Thank you!     Thank you for choosing [facility]  for your care. Our goal is always to provide you with excellent care. Hearing back from our patients is one way we can continue to improve our services. Please take a few minutes to complete the written survey that you may receive in the mail after your visit with us. Thank you!             Your Updated Medication List - Protect others around you: Learn how to safely use, store and throw away your medicines at www.disposemymeds.org.          This list is accurate as of 6/25/18  4:41 PM.  Always use your most recent med list.                   Brand Name Dispense Instructions for use Diagnosis    albuterol 108 (90 Base) MCG/ACT Inhaler    VENTOLIN HFA    18 g    Inhale 1-2 puffs into the lungs every 4 hours as needed for shortness of breath / dyspnea or wheezing    Mild persistent asthma without complication       ALPRAZolam 0.5 MG tablet    XANAX    30 tablet    Take 1 tablet (0.5 mg) by mouth daily as needed for anxiety    Panic attack       beclomethasone HFA 80 MCG/ACT Aerb inhaler    QVAR REDIHALER    1 Inhaler    Inhale 2 puffs into the lungs 2 times daily    Moderate persistent asthma without complication       FLUoxetine 40 MG capsule    PROzac    60 capsule    Take 2 capsules (80 mg) by mouth daily    Severe episode of recurrent major depressive disorder, without psychotic features (H)       levonorgestrel 20 MCG/24HR IUD    MIRENA     1 each (20 mcg) by Intrauterine route continuous    Encounter for IUD insertion       QUEtiapine 25 MG tablet    SEROQUEL    30 tablet    Take 1 tablet (25 mg) by mouth nightly as needed    Severe episode of recurrent major depressive disorder, without psychotic features (H)

## 2018-06-25 NOTE — TELEPHONE ENCOUNTER
Called to instruct patient no unprotected intercourse until assured that IUD is in place. Left message to call back.  Kirstin Harry CNM, WHMOISES-BC

## 2018-06-25 NOTE — MR AVS SNAPSHOT
After Visit Summary   6/25/2018    Erica Gordon    MRN: [id number]           Patient Information     Date Of Birth          1990        Visit Information        Provider Department      6/25/2018 3:00 PM Charlene Culver MD [facility]        Today's Diagnoses     Moderate persistent asthma without complication    -  1    Severe episode of recurrent major depressive disorder, without psychotic features (H)        Panic attack          Care Instructions    I referred you to therapy - they will call to schedule.    START higher dose of Prozac: 80mg daily (new prescription sent in)    START Seroquel 25mg at night (maybe start with a half tab to make sure you don't get too sleepy).    Xanax SPARINGLY as needed for extreme anxiety/panic attacks. Do not drive after taking this. Warning: this is habit-forming/addictive.    ---    For asthma:    Beclomethasone inhaler - 2 puffs TWICE a day EVERY DAY.    Use albuterol inhaler sparingly.    ---    Follow-up in 6-8 weeks (earlier as needed).           Follow-ups after your visit        Additional Services     MENTAL HEALTH REFERRAL  - Adult; Outpatient Treatment; Individual/Couples/Family/Group Therapy/Health Psychology; FMG: [facility] [phone]; We will contact you to schedule the appointment or please call with any questions       All scheduling is subject to the client's specific insurance plan & benefits, provider/location availability, and provider clinical specialities.  Please arrive 15 minutes early for your first appointment and bring your completed paperwork.    Please be aware that coverage of these services is subject to the terms and limitations of your health insurance plan.  Call member services at your health plan with any benefit or coverage questions.                            Your next 10 appointments already scheduled     Jun 25, 2018  4:00 PM CDT   SHORT with Kirstin Harry,  ALEXEI   [facility] ([facility])    [address] [address] [phone]              Who to contact     If you have questions or need follow up information about today's clinic visit or your schedule please contact [facility] directly at [phone].  Normal or non-critical lab and imaging results will be communicated to you by MyChart, letter or phone within 4 business days after the clinic has received the results. If you do not hear from us within 7 days, please contact the clinic through MyChart or phone. If you have a critical or abnormal lab result, we will notify you by phone as soon as possible.  Submit refill requests through [employer] or call your pharmacy and they will forward the refill request to us. Please allow 3 business days for your refill to be completed.          Additional Information About Your Visit        Care EveryWhere ID     This is your Care EveryWhere ID. This could be used by other organizations to access your [place] medical records  [id number]        Your Vitals Were     Pulse Temperature Respirations Pulse Oximetry BMI (Body Mass Index)       78 98.6  F (37  C) (Oral) 20 98% 43.55 kg/m2        Blood Pressure from Last 3 Encounters:   06/25/18 120/84   04/30/18 122/66   04/25/18 126/86    Weight from Last 3 Encounters:   06/25/18 277 lb 3.2 oz (125.7 kg)   04/30/18 268 lb (121.6 kg)   04/25/18 266 lb (120.7 kg)              We Performed the Following     MENTAL HEALTH REFERRAL  - Adult; Outpatient Treatment; Individual/Couples/Family/Group Therapy/Health Psychology; FMG: [facility] [phone]; We will contact you to schedule the appointment or please call with any questions          Today's Medication Changes          These changes are accurate as of 6/25/18  3:18 PM.  If you have any questions, ask your nurse or doctor.               Start taking these medicines.         Dose/Directions    ALPRAZolam 0.5 MG tablet   Commonly known as:  XANAX   Used for:  Panic attack   Started by:  Charlene Culver MD        Dose:  0.5 mg   Take 1 tablet (0.5 mg) by mouth daily as needed for anxiety   Quantity:  30 tablet   Refills:  0       beclomethasone HFA 80 MCG/ACT Aerb inhaler   Commonly known as:  QVAR REDIHALER   Used for:  Moderate persistent asthma without complication   Started by:  Charlene Culver MD        Dose:  2 puff   Inhale 2 puffs into the lungs 2 times daily   Quantity:  1 Inhaler   Refills:  3       QUEtiapine 25 MG tablet   Commonly known as:  SEROQUEL   Used for:  Severe episode of recurrent major depressive disorder, without psychotic features (H)   Started by:  Charlene Culver MD        Dose:  25 mg   Take 1 tablet (25 mg) by mouth nightly as needed   Quantity:  30 tablet   Refills:  1         These medicines have changed or have updated prescriptions.        Dose/Directions    * FLUoxetine 40 MG capsule   Commonly known as:  PROzac   This may have changed:  Another medication with the same name was added. Make sure you understand how and when to take each.   Used for:  Anxiety and depression   Changed by:  Charlene Culver MD        TAKE 1 CAPSULE(40 MG) BY MOUTH DAILY   Quantity:  30 capsule   Refills:  0       * FLUoxetine 40 MG capsule   Commonly known as:  PROzac   This may have changed:  You were already taking a medication with the same name, and this prescription was added. Make sure you understand how and when to take each.   Used for:  Severe episode of recurrent major depressive disorder, without psychotic features (H)   Changed by:  Charlene Culver MD        Dose:  80 mg   Take 2 capsules (80 mg) by mouth daily   Quantity:  60 capsule   Refills:  1       * Notice:  This list has 2 medication(s) that are the same as other medications prescribed for you. Read the directions carefully, and ask your doctor or other care provider to review them  with you.         Where to get your medicines      These medications were sent to [id number] - [facility] [address] AT [facility] [address], [facility] [address]     Phone:  [phone]     beclomethasone HFA 80 MCG/ACT Aerb inhaler    FLUoxetine 40 MG capsule    QUEtiapine 25 MG tablet         Some of these will need a paper prescription and others can be bought over the counter.  Ask your nurse if you have questions.     Bring a paper prescription for each of these medications     ALPRAZolam 0.5 MG tablet                Primary Care Provider Office Phone # Fax #    Charlene Culver [phone]       [address] [facility] [address]        Equal Access to Services     CYRUS ZAMORA : Hadii kellie ortiz hadasho Soomaali, waaxda luqadaha, qaybta kaalmada adeegyada, [facility]. So [facility] [phone].    ATENCIÓN: Si habla español, tiene a bassett disposición servicios gratuitos de asistencia lingüística. Karlie al [phone].    We comply with applicable federal civil rights laws and [place] laws. We do not discriminate on the basis of race, color, national origin, age, disability, sex, sexual orientation, or gender identity.            Thank you!     Thank you for choosing [facility]  for your care. Our goal is always to provide you with excellent care. Hearing back from our patients is one way we can continue to improve our services. Please take a few minutes to complete the written survey that you may receive in the mail after your visit with us. Thank you!             Your Updated Medication List - Protect others around you: Learn how to safely use, store and throw away your medicines at www.disposemymeds.org.          This list is accurate as of 6/25/18  3:18 PM.  Always use your most recent med list.                   Brand Name Dispense Instructions for use Diagnosis    albuterol 108 (90  Base) MCG/ACT Inhaler    VENTOLIN HFA    18 g    Inhale 1-2 puffs into the lungs every 4 hours as needed for shortness of breath / dyspnea or wheezing    Mild persistent asthma without complication       ALPRAZolam 0.5 MG tablet    XANAX    30 tablet    Take 1 tablet (0.5 mg) by mouth daily as needed for anxiety    Panic attack       beclomethasone HFA 80 MCG/ACT Aerb inhaler    QVAR REDIHALER    1 Inhaler    Inhale 2 puffs into the lungs 2 times daily    Moderate persistent asthma without complication       * FLUoxetine 40 MG capsule    PROzac    30 capsule    TAKE 1 CAPSULE(40 MG) BY MOUTH DAILY    Anxiety and depression       * FLUoxetine 40 MG capsule    PROzac    60 capsule    Take 2 capsules (80 mg) by mouth daily    Severe episode of recurrent major depressive disorder, without psychotic features (H)       levonorgestrel 20 MCG/24HR IUD    MIRENA     1 each (20 mcg) by Intrauterine route continuous    Encounter for IUD insertion       QUEtiapine 25 MG tablet    SEROQUEL    30 tablet    Take 1 tablet (25 mg) by mouth nightly as needed    Severe episode of recurrent major depressive disorder, without psychotic features (H)       * Notice:  This list has 2 medication(s) that are the same as other medications prescribed for you. Read the directions carefully, and ask your doctor or other care provider to review them with you.

## 2018-06-25 NOTE — PROGRESS NOTES
"S: Erica Gordon   is a 27 year old  here for IUD check.  She denies problems with the IUD but is concerned because she has not been able to find strings.  Has not had bleeding since IUD placed.     O: BP (!) 130/92  Ht 5' 6.9\" (1.699 m)  Wt 275 lb (124.7 kg)  BMI 43.2 kg/m2  PELVIC EXAM:  Vulva: No external lesions, normal hair distribution, no adenopathy, BUS WNL  Vagina: Moist, pink, no abnormal discharge, well rugated, no lesions  Cervix: smooth, pink, no visible lesions, neg CMT  IUD strings not visualized, despite several attempts. Inserted swab 0.5 cm into cervical os to sweep for strings, without success. No strings or IUD palpated.   Uterus: Normal size, non-tender  Adnexae: WNL        ASSESSMENT/PLAN:   Encounter Diagnosis   Name Primary?     Intrauterine contraceptive device threads lost, initial encounter Yes     IUD strings not visualized or palpated on vaginal exam  Pelvic ultrasound for placement of IUD.   Will follow up with ultrasound results and plan    Kirstin Harry, ALEXEI, WHMOISES-BC    "

## 2018-06-26 ASSESSMENT — PATIENT HEALTH QUESTIONNAIRE - PHQ9: SUM OF ALL RESPONSES TO PHQ QUESTIONS 1-9: 26

## 2018-06-26 ASSESSMENT — ASTHMA QUESTIONNAIRES: ACT_TOTALSCORE: 11

## 2018-06-26 NOTE — TELEPHONE ENCOUNTER
Attempted to notify patient again of no unprotected intercourse prior to ensuring IUD correct placement. Left a message.    Kirstin Harry CNM, WHMOISES-BC

## 2018-06-29 NOTE — TELEPHONE ENCOUNTER
Left message to call back on voicemail on cell phone.  pts pelvic US is scheduled for 7/6/18.    JENNIFER Dent RN

## 2018-07-06 ENCOUNTER — RADIANT APPOINTMENT (OUTPATIENT)
Dept: ULTRASOUND IMAGING | Facility: CLINIC | Age: 28
End: 2018-07-06
Attending: ADVANCED PRACTICE MIDWIFE
Payer: COMMERCIAL

## 2018-07-06 DIAGNOSIS — T83.32XA INTRAUTERINE CONTRACEPTIVE DEVICE THREADS LOST, INITIAL ENCOUNTER: ICD-10-CM

## 2018-07-06 PROCEDURE — 76830 TRANSVAGINAL US NON-OB: CPT | Performed by: OBSTETRICS & GYNECOLOGY

## 2018-07-06 PROCEDURE — 76856 US EXAM PELVIC COMPLETE: CPT | Performed by: OBSTETRICS & GYNECOLOGY

## 2018-07-10 ENCOUNTER — TELEPHONE (OUTPATIENT)
Dept: OBGYN | Facility: CLINIC | Age: 28
End: 2018-07-10

## 2018-07-10 NOTE — TELEPHONE ENCOUNTER
Called patient to notify of ultrasound results. Left a message to call back. Patient should see OB-GYN for evaluation of ovarian cyst. IUD is in place.   Kirstin Harry CNM, DIEGO-BC

## 2018-07-11 NOTE — TELEPHONE ENCOUNTER
Patient notified of results. She will call back to make appt with OBGYN to discuss cyst.      Nel Sen RN

## 2018-08-12 DIAGNOSIS — F33.2 SEVERE EPISODE OF RECURRENT MAJOR DEPRESSIVE DISORDER, WITHOUT PSYCHOTIC FEATURES (H): ICD-10-CM

## 2018-08-12 NOTE — TELEPHONE ENCOUNTER
Requested Prescriptions   Pending Prescriptions Disp Refills     QUEtiapine (SEROQUEL) 25 MG tablet [Pharmacy Med Name: QUETIAPINE 25MG TABLETS] 30 tablet 0    Last Written Prescription Date:  6/25/2018  Last Fill Quantity: 30,  # refills: 1   Last office visit: 6/25/2018 with prescribing provider:  6/25/2018   Future Office Visit:     Sig: TAKE 1 TABLET(25 MG) BY MOUTH EVERY NIGHT AS NEEDED    Antipsychotic Medications Failed    8/12/2018 11:38 AM       Failed - Blood pressure under 140/90 in past 12 months    BP Readings from Last 3 Encounters:   06/25/18 (!) 130/92   06/25/18 120/84   04/30/18 122/66                Failed - Lipid panel on file within the past 12 months    No lab results found.           Failed - CBC on file in past 12 months    Recent Labs   Lab Test  02/28/16   0843   07/27/15   1105   WBC   --    --   10.3   RBC   --    --   4.05   HGB  10.2*   < >  13.0   HCT   --    --   38.9   PLT   --    --   314    < > = values in this interval not displayed.       For [facility] ONLY: [id number] = WBC, [id number] = RBC         Failed - A1c or Glucose on file in past 12 months    No lab results found.    Please review patients last 3 weights. If a weight gain of >10 lbs exists, you may refill the prescription once after instructing the patient to schedule an appointment within the next 30 days.    Wt Readings from Last 3 Encounters:   06/25/18 275 lb (124.7 kg)   06/25/18 277 lb 3.2 oz (125.7 kg)   04/30/18 268 lb (121.6 kg)            Passed - Patient is 12 years of age or older       Passed - Heart Rate on file within past 12 months    Pulse Readings from Last 3 Encounters:   06/25/18 78   04/30/18 90   04/05/18 85              Passed - Patient is not pregnant       Passed - No positve pregnancy test on file in past 12 months       Passed - Recent (6 mo) or future (30 days) visit within the authorizing provider's specialty    Patient had office visit in the last 6 months or has a visit in the next 30 days with  "authorizing provider or within the authorizing provider's specialty.  See \"Patient Info\" tab in inbasket, or \"Choose Columns\" in Meds & Orders section of the refill encounter.              "

## 2018-08-13 NOTE — TELEPHONE ENCOUNTER
"Requested Prescriptions   Pending Prescriptions Disp Refills     FLUoxetine (PROZAC) 40 MG capsule [Pharmacy Med Name: FLUOXETINE 40MG CAPSULES] 60 capsule 0    Last Written Prescription Date:  6/25/18  Last Fill Quantity: 60,  # refills: 1   Last office visit: 6/25/2018 with prescribing provider:  6/25/18   Future Office Visit:     Sig: TAKE 2 CAPSULES(80 MG) BY MOUTH DAILY    SSRIs Protocol Failed    8/12/2018  9:39 PM       Failed - PHQ-9 score less than 5 in past 6 months    Please review last PHQ-9 score.          Passed - Patient is age 18 or older       Passed - No active pregnancy on record       Passed - No positive pregnancy test in last 12 months       Passed - Recent (6 mo) or future (30 days) visit within the authorizing provider's specialty    Patient had office visit in the last 6 months or has a visit in the next 30 days with authorizing provider or within the authorizing provider's specialty.  See \"Patient Info\" tab in inbasket, or \"Choose Columns\" in Meds & Orders section of the refill encounter.            PHQ-9 SCORE 1/8/2018 4/6/2018 6/25/2018   Total Score - - -   Total Score 16 19 26       "

## 2018-08-14 RX ORDER — FLUOXETINE 40 MG/1
CAPSULE ORAL
Qty: 60 CAPSULE | Refills: 0 | Status: SHIPPED | OUTPATIENT
Start: 2018-08-14 | End: 2018-09-10

## 2018-08-14 RX ORDER — QUETIAPINE FUMARATE 25 MG/1
TABLET, FILM COATED ORAL
Qty: 30 TABLET | Refills: 0 | Status: SHIPPED | OUTPATIENT
Start: 2018-08-14 | End: 2018-09-16

## 2018-08-14 NOTE — TELEPHONE ENCOUNTER
Routing refill request to provider for review/approval because:   out of range:  blood pressure

## 2018-09-10 DIAGNOSIS — F33.2 SEVERE EPISODE OF RECURRENT MAJOR DEPRESSIVE DISORDER, WITHOUT PSYCHOTIC FEATURES (H): ICD-10-CM

## 2018-09-10 NOTE — LETTER
[facility]  [address] [id number] [phone]            Erica Gordon  [address] [facility] [address]        September 13, 2018    Dear Erica,    While refilling your prescription today, we noticed that you are due for an appointment with your provider.  We will refill your prescription for 30 days, but a follow-up appointment must be made before any additional refills can be approved.     Taking care of your health is important to us and we look forward to seeing you in the near future.  Please call us at [phone] or [phone] (or use [employer]) to schedule an appointment.     Please disregard this notice if you have already made an appointment.    Sincerely,        [facility]

## 2018-09-11 RX ORDER — FLUOXETINE 40 MG/1
CAPSULE ORAL
Qty: 60 CAPSULE | Refills: 0 | Status: SHIPPED | OUTPATIENT
Start: 2018-09-11 | End: 2018-10-07

## 2018-09-11 NOTE — TELEPHONE ENCOUNTER
"Requested Prescriptions   Pending Prescriptions Disp Refills     FLUoxetine (PROZAC) 40 MG capsule [Pharmacy Med Name: FLUOXETINE 40MG CAPSULES] 60 capsule 0     Sig: TAKE 2 CAPSULES(80 MG) BY MOUTH DAILY    SSRIs Protocol Failed    9/10/2018  2:54 PM       Failed - PHQ-9 score less than 5 in past 6 months    Please review last PHQ-9 score.          Passed - Patient is age 18 or older       Passed - No active pregnancy on record       Passed - No positive pregnancy test in last 12 months       Passed - Recent (6 mo) or future (30 days) visit within the authorizing provider's specialty    Patient had office visit in the last 6 months or has a visit in the next 30 days with authorizing provider or within the authorizing provider's specialty.  See \"Patient Info\" tab in inbasket, or \"Choose Columns\" in Meds & Orders section of the refill encounter.            Last Written Prescription Date:  8/14/2018  Last Fill Quantity: 60,  # refills: 0   Last office visit: 6/25/2018 with prescribing provider:  6/25/2018   Future Office Visit:      PHQ-9 SCORE 1/8/2018 4/6/2018 6/25/2018   Total Score - - -   Total Score 16 19 26       "

## 2018-09-12 NOTE — TELEPHONE ENCOUNTER
1 month refill provided.     Patient is overdue for follow-up. Please ask her to schedule an appointment with me when able.

## 2018-09-16 DIAGNOSIS — F33.2 SEVERE EPISODE OF RECURRENT MAJOR DEPRESSIVE DISORDER, WITHOUT PSYCHOTIC FEATURES (H): ICD-10-CM

## 2018-09-16 NOTE — TELEPHONE ENCOUNTER
Requested Prescriptions   Pending Prescriptions Disp Refills     QUEtiapine (SEROQUEL) 25 MG tablet [Pharmacy Med Name: QUETIAPINE 25MG TABLETS] 30 tablet 0    Last Written Prescription Date:  8/14/2018  Last Fill Quantity: 30,  # refills: 0   Last office visit: 6/25/2018 with prescribing provider:  6/25/2018   Future Office Visit:     Sig: TAKE 1 TABLET(25 MG) BY MOUTH EVERY NIGHT AS NEEDED    Antipsychotic Medications Failed    9/16/2018 11:42 AM       Failed - Blood pressure under 140/90 in past 12 months    BP Readings from Last 3 Encounters:   06/25/18 (!) 130/92   06/25/18 120/84   04/30/18 122/66                Failed - Lipid panel on file within the past 12 months    No lab results found.           Failed - CBC on file in past 12 months    Recent Labs   Lab Test  02/28/16   0843   07/27/15   1105   WBC   --    --   10.3   RBC   --    --   4.05   HGB  10.2*   < >  13.0   HCT   --    --   38.9   PLT   --    --   314    < > = values in this interval not displayed.       For [facility] ONLY: [id number] = WBC, [id number] = RBC         Failed - A1c or Glucose on file in past 12 months    No lab results found.    Please review patients last 3 weights. If a weight gain of >10 lbs exists, you may refill the prescription once after instructing the patient to schedule an appointment within the next 30 days.    Wt Readings from Last 3 Encounters:   06/25/18 275 lb (124.7 kg)   06/25/18 277 lb 3.2 oz (125.7 kg)   04/30/18 268 lb (121.6 kg)            Passed - Patient is 12 years of age or older       Passed - Heart Rate on file within past 12 months    Pulse Readings from Last 3 Encounters:   06/25/18 78   04/30/18 90   04/05/18 85              Passed - Patient is not pregnant       Passed - No positve pregnancy test on file in past 12 months       Passed - Recent (6 mo) or future (30 days) visit within the authorizing provider's specialty    Patient had office visit in the last 6 months or has a visit in the next 30 days with  "authorizing provider or within the authorizing provider's specialty.  See \"Patient Info\" tab in inbasket, or \"Choose Columns\" in Meds & Orders section of the refill encounter.              "

## 2018-09-18 RX ORDER — QUETIAPINE FUMARATE 25 MG/1
TABLET, FILM COATED ORAL
Qty: 30 TABLET | Refills: 0 | Status: SHIPPED | OUTPATIENT
Start: 2018-09-18 | End: 2018-10-14

## 2018-09-27 ENCOUNTER — OFFICE VISIT (OUTPATIENT)
Dept: INTERNAL MEDICINE | Facility: CLINIC | Age: 28
End: 2018-09-27
Payer: COMMERCIAL

## 2018-09-27 VITALS
HEART RATE: 72 BPM | TEMPERATURE: 99.4 F | DIASTOLIC BLOOD PRESSURE: 86 MMHG | WEIGHT: 293 LBS | OXYGEN SATURATION: 98 % | SYSTOLIC BLOOD PRESSURE: 132 MMHG | RESPIRATION RATE: 20 BRPM | BODY MASS INDEX: 46.53 KG/M2

## 2018-09-27 DIAGNOSIS — F33.2 SEVERE EPISODE OF RECURRENT MAJOR DEPRESSIVE DISORDER, WITHOUT PSYCHOTIC FEATURES (H): Primary | ICD-10-CM

## 2018-09-27 DIAGNOSIS — F41.1 GAD (GENERALIZED ANXIETY DISORDER): ICD-10-CM

## 2018-09-27 DIAGNOSIS — F41.0 PANIC ATTACK: ICD-10-CM

## 2018-09-27 PROCEDURE — 99214 OFFICE O/P EST MOD 30 MIN: CPT | Performed by: INTERNAL MEDICINE

## 2018-09-27 RX ORDER — BUPROPION HYDROCHLORIDE 150 MG/1
150 TABLET ORAL EVERY MORNING
Qty: 90 TABLET | Refills: 0 | Status: SHIPPED | OUTPATIENT
Start: 2018-09-27 | End: 2018-12-22

## 2018-09-27 RX ORDER — ALPRAZOLAM 1 MG
1 TABLET ORAL 3 TIMES DAILY PRN
Qty: 30 TABLET | Refills: 0 | Status: SHIPPED | OUTPATIENT
Start: 2018-09-27 | End: 2018-11-27

## 2018-09-27 ASSESSMENT — ANXIETY QUESTIONNAIRES
5. BEING SO RESTLESS THAT IT IS HARD TO SIT STILL: NEARLY EVERY DAY
6. BECOMING EASILY ANNOYED OR IRRITABLE: NEARLY EVERY DAY
7. FEELING AFRAID AS IF SOMETHING AWFUL MIGHT HAPPEN: MORE THAN HALF THE DAYS
3. WORRYING TOO MUCH ABOUT DIFFERENT THINGS: NEARLY EVERY DAY
1. FEELING NERVOUS, ANXIOUS, OR ON EDGE: NEARLY EVERY DAY
IF YOU CHECKED OFF ANY PROBLEMS ON THIS QUESTIONNAIRE, HOW DIFFICULT HAVE THESE PROBLEMS MADE IT FOR YOU TO DO YOUR WORK, TAKE CARE OF THINGS AT HOME, OR GET ALONG WITH OTHER PEOPLE: VERY DIFFICULT
GAD7 TOTAL SCORE: 20
2. NOT BEING ABLE TO STOP OR CONTROL WORRYING: NEARLY EVERY DAY

## 2018-09-27 ASSESSMENT — PATIENT HEALTH QUESTIONNAIRE - PHQ9: 5. POOR APPETITE OR OVEREATING: NEARLY EVERY DAY

## 2018-09-27 NOTE — MR AVS SNAPSHOT
After Visit Summary   9/27/2018    Erica Gordon    MRN: [id number]           Patient Information     Date Of Birth          1990        Visit Information        Provider Department      9/27/2018 3:15 PM Charlene Culver MD [facility]        Today's Diagnoses     Depression with anxiety    -  1      Care Instructions    Let's CONTINUE Prozac at 80mg. Can possibly wean down or off in the future (not now though).    START Wellbutrin 150mg daily.     Xanax sparingly as needed.     Follow-up with me in ~6 weeks for mood check.              Follow-ups after your visit        Who to contact     If you have questions or need follow up information about today's clinic visit or your schedule please contact [facility] directly at [phone].  Normal or non-critical lab and imaging results will be communicated to you by MyChart, letter or phone within 4 business days after the clinic has received the results. If you do not hear from us within 7 days, please contact the clinic through MyChart or phone. If you have a critical or abnormal lab result, we will notify you by phone as soon as possible.  Submit refill requests through [employer] or call your pharmacy and they will forward the refill request to us. Please allow 3 business days for your refill to be completed.          Additional Information About Your Visit        Care EveryWhere ID     This is your Care EveryWhere ID. This could be used by other organizations to access your [place] medical records  [id number]        Your Vitals Were     Pulse Temperature Respirations Pulse Oximetry BMI (Body Mass Index)       72 99.4  F (37.4  C) (Oral) 20 98% 46.53 kg/m2        Blood Pressure from Last 3 Encounters:   09/27/18 132/86   06/25/18 (!) 130/92   06/25/18 120/84    Weight from Last 3 Encounters:   09/27/18 296 lb 3.2 oz (134.4 kg)   06/25/18 275 lb (124.7 kg)   06/25/18 277 lb 3.2 oz (125.7 kg)               Today, you had the following     No orders found for display         Today's Medication Changes          These changes are accurate as of 9/27/18  3:17 PM.  If you have any questions, ask your nurse or doctor.               Start taking these medicines.        Dose/Directions    ALPRAZolam 1 MG tablet   Commonly known as:  XANAX   Used for:  Depression with anxiety   Started by:  Charlene Culver MD        Dose:  1 mg   Take 1 tablet (1 mg) by mouth 3 times daily as needed for anxiety   Quantity:  30 tablet   Refills:  0       buPROPion 150 MG 24 hr tablet   Commonly known as:  WELLBUTRIN XL   Used for:  Depression with anxiety   Started by:  Charlene Culver MD        Dose:  150 mg   Take 1 tablet (150 mg) by mouth every morning   Quantity:  90 tablet   Refills:  0            Where to get your medicines      These medications were sent to [facility] [address] [place] [address] [address] [facility] [place] [address] [address] [facility] [address]     Phone:  [phone]     buPROPion 150 MG 24 hr tablet         Some of these will need a paper prescription and others can be bought over the counter.  Ask your nurse if you have questions.     Bring a paper prescription for each of these medications     ALPRAZolam 1 MG tablet                Primary Care Provider Office Phone # Fax #    Charlene Culver [phone]       [address] [facility] [address]        Equal Access to Services     CYRUS ZAMORA AH: Hadii kellie ku hadasho Soomaali, waaxda luqadaha, qaybta kaalmada adeegyada, moses rico hayclinton johnson. So [facility] [phone].    ATENCIÓN: Si habla español, tiene a bassett disposición servicios gratuitos de asistencia lingüística. Llame al [phone].    We comply with applicable federal civil rights laws and [place] laws. We do not discriminate on the basis of race, color, national origin, age, disability, sex, sexual orientation, or gender  identity.            Thank you!     Thank you for choosing [facility]  for your care. Our goal is always to provide you with excellent care. Hearing back from our patients is one way we can continue to improve our services. Please take a few minutes to complete the written survey that you may receive in the mail after your visit with us. Thank you!             Your Updated Medication List - Protect others around you: Learn how to safely use, store and throw away your medicines at www.disposemymeds.org.          This list is accurate as of 9/27/18  3:17 PM.  Always use your most recent med list.                   Brand Name Dispense Instructions for use Diagnosis    albuterol 108 (90 Base) MCG/ACT inhaler    VENTOLIN HFA    18 g    Inhale 1-2 puffs into the lungs every 4 hours as needed for shortness of breath / dyspnea or wheezing    Mild persistent asthma without complication       ALPRAZolam 1 MG tablet    XANAX    30 tablet    Take 1 tablet (1 mg) by mouth 3 times daily as needed for anxiety    Depression with anxiety       beclomethasone HFA 80 MCG/ACT inhaler    QVAR REDIHALER    1 Inhaler    Inhale 2 puffs into the lungs 2 times daily    Moderate persistent asthma without complication       buPROPion 150 MG 24 hr tablet    WELLBUTRIN XL    90 tablet    Take 1 tablet (150 mg) by mouth every morning    Depression with anxiety       FLUoxetine 40 MG capsule    PROzac    60 capsule    TAKE 2 CAPSULES(80 MG) BY MOUTH DAILY    Severe episode of recurrent major depressive disorder, without psychotic features (H)       levonorgestrel 20 MCG/24HR IUD    MIRENA     1 each (20 mcg) by Intrauterine route continuous    Encounter for IUD insertion       QUEtiapine 25 MG tablet    SEROquel    30 tablet    TAKE 1 TABLET(25 MG) BY MOUTH EVERY NIGHT AS NEEDED    Severe episode of recurrent major depressive disorder, without psychotic features (H)

## 2018-09-27 NOTE — PATIENT INSTRUCTIONS
Let's CONTINUE Prozac at 80mg. Can possibly wean down or off in the future (not now though).    START Wellbutrin 150mg daily.     Xanax sparingly as needed.     Follow-up with me in ~6 weeks for mood check.

## 2018-09-28 ASSESSMENT — ASTHMA QUESTIONNAIRES: ACT_TOTALSCORE: 23

## 2018-09-28 ASSESSMENT — ANXIETY QUESTIONNAIRES: GAD7 TOTAL SCORE: 20

## 2018-09-28 ASSESSMENT — PATIENT HEALTH QUESTIONNAIRE - PHQ9: SUM OF ALL RESPONSES TO PHQ QUESTIONS 1-9: 20

## 2018-10-07 DIAGNOSIS — F33.2 SEVERE EPISODE OF RECURRENT MAJOR DEPRESSIVE DISORDER, WITHOUT PSYCHOTIC FEATURES (H): ICD-10-CM

## 2018-10-07 NOTE — TELEPHONE ENCOUNTER
"Requested Prescriptions   Pending Prescriptions Disp Refills     FLUoxetine (PROZAC) 40 MG capsule [Pharmacy Med Name: FLUOXETINE 40MG CAPSULES] 60 capsule 0    Last Written Prescription Date:  9/11/2018  Last Fill Quantity: 60,  # refills: 0   Last office visit: 9/27/2018 with prescribing provider:  9/27/2018   Future Office Visit:     Sig: TAKE 2 CAPSULES(80 MG) BY MOUTH DAILY    SSRIs Protocol Failed    10/7/2018 11:53 AM       Failed - PHQ-9 score less than 5 in past 6 months    Please review last PHQ-9 score.   PHQ-9 SCORE 4/6/2018 6/25/2018 9/27/2018   Total Score - - -   Total Score 19 26 20     TANMAY-7 SCORE 4/6/2018 4/30/2018 9/27/2018   Total Score - - -   Total Score 17 20 20                  Passed - Patient is age 18 or older       Passed - No active pregnancy on record       Passed - No positive pregnancy test in last 12 months       Passed - Recent (6 mo) or future (30 days) visit within the authorizing provider's specialty    Patient had office visit in the last 6 months or has a visit in the next 30 days with authorizing provider or within the authorizing provider's specialty.  See \"Patient Info\" tab in inbasket, or \"Choose Columns\" in Meds & Orders section of the refill encounter.              "

## 2018-10-09 RX ORDER — FLUOXETINE 40 MG/1
CAPSULE ORAL
Qty: 60 CAPSULE | Refills: 0 | Status: SHIPPED | OUTPATIENT
Start: 2018-10-09 | End: 2018-11-04

## 2018-10-14 DIAGNOSIS — F33.2 SEVERE EPISODE OF RECURRENT MAJOR DEPRESSIVE DISORDER, WITHOUT PSYCHOTIC FEATURES (H): ICD-10-CM

## 2018-10-14 NOTE — TELEPHONE ENCOUNTER
Requested Prescriptions   Pending Prescriptions Disp Refills     QUEtiapine (SEROQUEL) 25 MG tablet [Pharmacy Med Name: QUETIAPINE 25MG TABLETS] 30 tablet 0    Last Written Prescription Date:  9/18/2018  Last Fill Quantity: 30,  # refills: 0   Last office visit: 9/27/2018 with prescribing provider:  9/27/2018   Future Office Visit:     Sig: TAKE 1 TABLET(25 MG) BY MOUTH EVERY NIGHT AS NEEDED    Antipsychotic Medications Failed    10/14/2018 11:49 AM       Failed - Lipid panel on file within the past 12 months    No lab results found.           Failed - CBC on file in past 12 months    Recent Labs   Lab Test  02/28/16   0843   07/27/15   1105   WBC   --    --   10.3   RBC   --    --   4.05   HGB  10.2*   < >  13.0   HCT   --    --   38.9   PLT   --    --   314    < > = values in this interval not displayed.       For [facility] ONLY: [id number] = WBC, [id number] = RBC         Failed - A1c or Glucose on file in past 12 months    No lab results found.    Please review patients last 3 weights. If a weight gain of >10 lbs exists, you may refill the prescription once after instructing the patient to schedule an appointment within the next 30 days.    Wt Readings from Last 3 Encounters:   09/27/18 296 lb 3.2 oz (134.4 kg)   06/25/18 275 lb (124.7 kg)   06/25/18 277 lb 3.2 oz (125.7 kg)            Passed - Blood pressure under 140/90 in past 12 months    BP Readings from Last 3 Encounters:   09/27/18 132/86   06/25/18 (!) 130/92   06/25/18 120/84                Passed - Patient is 12 years of age or older       Passed - Heart Rate on file within past 12 months    Pulse Readings from Last 3 Encounters:   09/27/18 72   06/25/18 78   04/30/18 90              Passed - Patient is not pregnant       Passed - No positve pregnancy test on file in past 12 months       Passed - Recent (6 mo) or future (30 days) visit within the authorizing provider's specialty    Patient had office visit in the last 6 months or has a visit in the next 30 days  "with authorizing provider or within the authorizing provider's specialty.  See \"Patient Info\" tab in inbasket, or \"Choose Columns\" in Meds & Orders section of the refill encounter.              "

## 2018-10-15 RX ORDER — QUETIAPINE FUMARATE 25 MG/1
TABLET, FILM COATED ORAL
Qty: 30 TABLET | Refills: 0 | Status: SHIPPED | OUTPATIENT
Start: 2018-10-15 | End: 2018-11-27

## 2018-10-15 NOTE — TELEPHONE ENCOUNTER
Routing refill request to provider for review/approval because:  Labs not current:  CBC,  LDL  Fyi, patient due for med recheck around 11/2018    Na SOL RN, BSN, PHN

## 2018-10-27 ENCOUNTER — OFFICE VISIT (OUTPATIENT)
Dept: URGENT CARE | Facility: URGENT CARE | Age: 28
End: 2018-10-27
Payer: COMMERCIAL

## 2018-10-27 VITALS
OXYGEN SATURATION: 98 % | HEART RATE: 81 BPM | SYSTOLIC BLOOD PRESSURE: 138 MMHG | TEMPERATURE: 97.8 F | RESPIRATION RATE: 20 BRPM | BODY MASS INDEX: 46.45 KG/M2 | WEIGHT: 293 LBS | DIASTOLIC BLOOD PRESSURE: 81 MMHG

## 2018-10-27 DIAGNOSIS — S61.211A LACERATION OF LEFT INDEX FINGER WITHOUT FOREIGN BODY WITHOUT DAMAGE TO NAIL, INITIAL ENCOUNTER: Primary | ICD-10-CM

## 2018-10-27 PROCEDURE — 12001 RPR S/N/AX/GEN/TRNK 2.5CM/<: CPT | Performed by: FAMILY MEDICINE

## 2018-10-27 NOTE — PROGRESS NOTES
SUBJECTIVE: @[email or web address] who presents to the clinic with a laceration on the finger sustained 1 hour(s) ago.    This is a non-work related and accidental injury.    Mechanism of injury: knife.  Associated symptoms: Denies numbness, weakness, or loss of function  Last tetanus booster within 10 years: yes    Past Medical History:   Diagnosis Date     Depression      Fibromyalgia      Mild intermittent asthma      Obesity (BMI 30-39.9)      Allergies   Allergen Reactions     Rocephin [Ceftriaxone Sodium] Anaphylaxis     Penicillins Hives     Latex Hives and Rash     Social History   Substance Use Topics     Smoking status: Current Every Day Smoker     Packs/day: 0.50     Years: 12.00     Types: Cigarettes     Smokeless tobacco: Never Used      Comment: 4-5 cigs/day as of 1/8/18     Alcohol use No       ROS:  Neuro: good distal sensation  Motor: normal rom and strenght  Hem: capillary refill < 2 sec    EXAM: The patient appears today in alert,no apparent distress distressVITALS:   /81  Pulse 81  Temp 97.8  F (36.6  C) (Oral)  Resp 20  Wt 295 lb 11.2 oz (134.1 kg)  SpO2 98%  BMI 46.45 kg/m2  Size of laceration: 1 centimeters  Characteristics of the laceration: clean and straight  Tendon function intact: yes  Sensation to light touch intact: yes  Pulses/Capillary refill intact: yes      ICD-10-CM    1. Laceration of left index finger without foreign body without damage to nail, initial encounter S61.211A REPAIR SUPERFICIAL, WOUND BODY < =2.5CM       Procedure Note:Wound injected with 1 cc's of Lidocaine 1% plain  Good anesthesia was obtainedPrepped and draped in the usual sterile fashion  Wound cleaned with sterile water  Wound soakedLaceration was closed using 3 5-0 nylon interrupted sutures  After care instructions:Keep wound clean   Sutures out in 10 days  Signs of infection discussed today

## 2018-10-27 NOTE — MR AVS SNAPSHOT
After Visit Summary   10/27/2018    Erica Gordon    MRN: [id number]           Patient Information     Date Of Birth          1990        Visit Information        Provider Department      10/27/2018 3:55 PM Vitor Alston DO [facility]        Today's Diagnoses     Laceration of left index finger without foreign body without damage to nail, initial encounter    -  1       Follow-ups after your visit        Who to contact     If you have questions or need follow up information about today's clinic visit or your schedule please contact [facility] directly at [phone].  Normal or non-critical lab and imaging results will be communicated to you by MyChart, letter or phone within 4 business days after the clinic has received the results. If you do not hear from us within 7 days, please contact the clinic through MyChart or phone. If you have a critical or abnormal lab result, we will notify you by phone as soon as possible.  Submit refill requests through [employer] or call your pharmacy and they will forward the refill request to us. Please allow 3 business days for your refill to be completed.          Additional Information About Your Visit        Care EveryWhere ID     This is your Care EveryWhere ID. This could be used by other organizations to access your [facility] medical records  [id number]        Your Vitals Were     Pulse Temperature Respirations Pulse Oximetry BMI (Body Mass Index)       81 97.8  F (36.6  C) (Oral) 20 98% 46.45 kg/m2        Blood Pressure from Last 3 Encounters:   10/27/18 138/81   09/27/18 132/86   06/25/18 (!) 130/92    Weight from Last 3 Encounters:   10/27/18 295 lb 11.2 oz (134.1 kg)   09/27/18 296 lb 3.2 oz (134.4 kg)   06/25/18 275 lb (124.7 kg)              We Performed the Following     REPAIR SUPERFICIAL, WOUND BODY < =2.5CM        Primary Care Provider Office Phone # Fax #    Charlene Culver MD  [phone]       [address] [facility] [address]        Equal Access to Services     CYRUS ZAMORA : Hadavelina kellie ortiz dimitri Christian, wamariangelda luqmartín, qaybta kaalmada felisha, moses tapianabeel elizabeth. So [facility] [phone].    ATENCIÓN: Si habla español, tiene a bassett disposición servicios gratuitos de asistencia lingüística. Llame al [phone].    We comply with applicable federal civil rights laws and [place] laws. We do not discriminate on the basis of race, color, national origin, age, disability, sex, sexual orientation, or gender identity.            Thank you!     Thank you for choosing [facility]  for your care. Our goal is always to provide you with excellent care. Hearing back from our patients is one way we can continue to improve our services. Please take a few minutes to complete the written survey that you may receive in the mail after your visit with us. Thank you!             Your Updated Medication List - Protect others around you: Learn how to safely use, store and throw away your medicines at www.disposemymeds.org.          This list is accurate as of 10/27/18  4:10 PM.  Always use your most recent med list.                   Brand Name Dispense Instructions for use Diagnosis    albuterol 108 (90 Base) MCG/ACT inhaler    VENTOLIN HFA    18 g    Inhale 1-2 puffs into the lungs every 4 hours as needed for shortness of breath / dyspnea or wheezing    Mild persistent asthma without complication       ALPRAZolam 1 MG tablet    XANAX    30 tablet    Take 1 tablet (1 mg) by mouth 3 times daily as needed for anxiety        beclomethasone HFA 80 MCG/ACT inhaler    QVAR REDIHALER    1 Inhaler    Inhale 2 puffs into the lungs 2 times daily    Moderate persistent asthma without complication       buPROPion 150 MG 24 hr tablet    WELLBUTRIN XL    90 tablet    Take 1 tablet (150 mg) by mouth every morning        FLUoxetine 40 MG capsule    PROzac     60 capsule    TAKE 2 CAPSULES(80 MG) BY MOUTH DAILY    Severe episode of recurrent major depressive disorder, without psychotic features (H)       levonorgestrel 20 MCG/24HR IUD    MIRENA     1 each (20 mcg) by Intrauterine route continuous    Encounter for IUD insertion       QUEtiapine 25 MG tablet    SEROquel    30 tablet    TAKE 1 TABLET(25 MG) BY MOUTH EVERY NIGHT AS NEEDED    Severe episode of recurrent major depressive disorder, without psychotic features (H)

## 2018-11-04 DIAGNOSIS — F33.2 SEVERE EPISODE OF RECURRENT MAJOR DEPRESSIVE DISORDER, WITHOUT PSYCHOTIC FEATURES (H): ICD-10-CM

## 2018-11-04 NOTE — TELEPHONE ENCOUNTER
"Requested Prescriptions   Pending Prescriptions Disp Refills     FLUoxetine (PROZAC) 40 MG capsule [Pharmacy Med Name: FLUOXETINE 40MG CAPSULES] 60 capsule 0    Last Written Prescription Date:  10/9/2018  Last Fill Quantity: 60,  # refills: 0   Last office visit: 9/27/2018 with prescribing provider:  9/27/2018   Future Office Visit:     Sig: TAKE 2 CAPSULES(80 MG) BY MOUTH DAILY    SSRIs Protocol Failed    11/4/2018 11:58 AM       Failed - PHQ-9 score less than 5 in past 6 months    Please review last PHQ-9 score.   PHQ-9 SCORE 4/6/2018 6/25/2018 9/27/2018   Total Score - - -   Total Score 19 26 20     TANMAY-7 SCORE 4/6/2018 4/30/2018 9/27/2018   Total Score - - -   Total Score 17 20 20                  Passed - Patient is age 18 or older       Passed - No active pregnancy on record       Passed - No positive pregnancy test in last 12 months       Passed - Recent (6 mo) or future (30 days) visit within the authorizing provider's specialty    Patient had office visit in the last 6 months or has a visit in the next 30 days with authorizing provider or within the authorizing provider's specialty.  See \"Patient Info\" tab in inbasket, or \"Choose Columns\" in Meds & Orders section of the refill encounter.              "

## 2018-11-06 RX ORDER — FLUOXETINE 40 MG/1
CAPSULE ORAL
Qty: 60 CAPSULE | Refills: 0 | Status: SHIPPED | OUTPATIENT
Start: 2018-11-06 | End: 2018-12-11

## 2018-11-06 NOTE — TELEPHONE ENCOUNTER
Routing refill request to provider for review/approval because:  Patient needs to be seen because:  Due for f/u per last office visit note  PHQ-9 score last 20

## 2018-11-27 ENCOUNTER — OFFICE VISIT (OUTPATIENT)
Dept: INTERNAL MEDICINE | Facility: CLINIC | Age: 28
End: 2018-11-27
Payer: COMMERCIAL

## 2018-11-27 VITALS
SYSTOLIC BLOOD PRESSURE: 110 MMHG | BODY MASS INDEX: 45.6 KG/M2 | OXYGEN SATURATION: 98 % | WEIGHT: 290.3 LBS | RESPIRATION RATE: 18 BRPM | DIASTOLIC BLOOD PRESSURE: 80 MMHG | TEMPERATURE: 98.4 F | HEART RATE: 90 BPM

## 2018-11-27 DIAGNOSIS — F41.1 GAD (GENERALIZED ANXIETY DISORDER): ICD-10-CM

## 2018-11-27 DIAGNOSIS — F33.1 MODERATE EPISODE OF RECURRENT MAJOR DEPRESSIVE DISORDER (H): Primary | ICD-10-CM

## 2018-11-27 DIAGNOSIS — G47.9 DIFFICULTY SLEEPING: ICD-10-CM

## 2018-11-27 DIAGNOSIS — E66.01 MORBID OBESITY (H): ICD-10-CM

## 2018-11-27 DIAGNOSIS — R06.83 SNORING: ICD-10-CM

## 2018-11-27 PROCEDURE — 99214 OFFICE O/P EST MOD 30 MIN: CPT | Performed by: INTERNAL MEDICINE

## 2018-11-27 RX ORDER — QUETIAPINE FUMARATE 50 MG/1
50 TABLET, FILM COATED ORAL AT BEDTIME
Qty: 90 TABLET | Refills: 3 | Status: SHIPPED | OUTPATIENT
Start: 2018-11-27 | End: 2019-04-04

## 2018-11-27 RX ORDER — ALPRAZOLAM 1 MG
1 TABLET ORAL DAILY PRN
Qty: 30 TABLET | Refills: 0 | Status: SHIPPED | OUTPATIENT
Start: 2018-11-27 | End: 2019-04-04

## 2018-11-27 ASSESSMENT — ANXIETY QUESTIONNAIRES
1. FEELING NERVOUS, ANXIOUS, OR ON EDGE: NEARLY EVERY DAY
6. BECOMING EASILY ANNOYED OR IRRITABLE: NEARLY EVERY DAY
IF YOU CHECKED OFF ANY PROBLEMS ON THIS QUESTIONNAIRE, HOW DIFFICULT HAVE THESE PROBLEMS MADE IT FOR YOU TO DO YOUR WORK, TAKE CARE OF THINGS AT HOME, OR GET ALONG WITH OTHER PEOPLE: EXTREMELY DIFFICULT
2. NOT BEING ABLE TO STOP OR CONTROL WORRYING: NEARLY EVERY DAY
GAD7 TOTAL SCORE: 21
3. WORRYING TOO MUCH ABOUT DIFFERENT THINGS: NEARLY EVERY DAY
5. BEING SO RESTLESS THAT IT IS HARD TO SIT STILL: NEARLY EVERY DAY
7. FEELING AFRAID AS IF SOMETHING AWFUL MIGHT HAPPEN: NEARLY EVERY DAY

## 2018-11-27 ASSESSMENT — PATIENT HEALTH QUESTIONNAIRE - PHQ9
5. POOR APPETITE OR OVEREATING: NEARLY EVERY DAY
SUM OF ALL RESPONSES TO PHQ QUESTIONS 1-9: 18

## 2018-11-27 NOTE — PATIENT INSTRUCTIONS
DECREASE Wellbutrin to 75mg (half tab) daily x 2 weeks, then stop.    CONTINUE Prozac 80mg daily.    Seroquel 50mg at night.    Refill of Xanax provided, #30 should last at least 2-3 months if not longer. Please use very sparingly. This medication is impairing (no drinking or driving while taking this).    Follow-up with me in 6-8 weeks please.

## 2018-11-27 NOTE — PROGRESS NOTES
SUBJECTIVE:                                                      HPI: Erica Gordon is a pleasant 28 year old female who presents for follow-up:    Was started on Wellbutrin 150 mg daily at last visit for suboptimally controlled depression and anxiety.  Was already on Prozac 80 mg daily for these. Using Xanax as needed for severe anxiety. Using Seroquel 50 mg at night for sleep.    Patient reports no significant improvement with the addition of Wellbutrin. Continues to have severe anxiety and significant depression.    Complains of feeling tired throughout the day. Endorses snoring. BMI 46.    She has tried BuSpar in the past with adverse side effects.    She has also tried Cymbalta in the past, but believes it worsened her anxiety and fibromyalgia pain.    She has not yet interested in meeting with a psychiatrist to discuss alternative treatments.    The medication, allergy, and problem lists have been reviewed and updated as appropriate.       OBJECTIVE:                                                      /80  Pulse 90  Temp 98.4  F (36.9  C) (Oral)  Resp 18  Wt 290 lb 4.8 oz (131.7 kg)  SpO2 98%  BMI 45.6 kg/m2  Constitutional: well-appearing  Psych: normal judgment and insight; normal mood and affect; recent and remote memory intact      ASSESSMENT/PLAN:                                                      (F33.1) Moderate episode of recurrent major depressive disorder (H)  (primary encounter diagnosis)  (F41.1) TANMAY (generalized anxiety disorder)  Comment:    - poorly controlled at present and no improvement with addition of Wellbutrin.   - has not tolerated BuSpar or Cymbalta in the past.  Plan:    - wean off of Wellbutrin over the next ~2 weeks.   - CONTINUE Prozac 80 mg daily.   - CONTINUE Xanax sparingly as needed.   - patient declines additional medication adjustments/changes or referral to psychiatry at this time.   - follow-up in 6-8 weeks.     (G47.9) Difficulty sleeping  Plan: refill  of Seroquel 50 mg provided.    (R06.83) Snoring  (E66.01) Morbid obesity (H)  Plan: referred for sleep evaluation - patient to schedule.    The instructions on the AVS were discussed and explained to the patient. Patient expressed understanding of instructions.    (Chart documentation was completed, in part, with [employer] voice-recognition software. Even though reviewed, some grammatical, spelling, and word errors may remain.)    Charlene Culver MD   [address] [address]  T: [phone], F: [phone]

## 2018-11-28 ASSESSMENT — ANXIETY QUESTIONNAIRES: GAD7 TOTAL SCORE: 21

## 2018-12-03 ENCOUNTER — HOSPITAL ENCOUNTER (EMERGENCY)
Facility: CLINIC | Age: 28
Discharge: HOME | End: 2018-12-03
Attending: NURSE PRACTITIONER | Admitting: NURSE PRACTITIONER
Payer: COMMERCIAL

## 2018-12-03 ENCOUNTER — APPOINTMENT (OUTPATIENT)
Dept: CT IMAGING | Facility: CLINIC | Age: 28
End: 2018-12-03
Attending: NURSE PRACTITIONER
Payer: COMMERCIAL

## 2018-12-03 VITALS
TEMPERATURE: 98.9 F | WEIGHT: 262 LBS | OXYGEN SATURATION: 89 % | HEIGHT: 67 IN | HEART RATE: 107 BPM | RESPIRATION RATE: 16 BRPM | BODY MASS INDEX: 41.12 KG/M2 | DIASTOLIC BLOOD PRESSURE: 86 MMHG | SYSTOLIC BLOOD PRESSURE: 132 MMHG

## 2018-12-03 DIAGNOSIS — J18.9 PNEUMONIA OF RIGHT LUNG DUE TO INFECTIOUS ORGANISM, UNSPECIFIED PART OF LUNG: ICD-10-CM

## 2018-12-03 DIAGNOSIS — R07.81 PLEURITIC CHEST PAIN: ICD-10-CM

## 2018-12-03 DIAGNOSIS — R91.8 PULMONARY INFILTRATE IN RIGHT LUNG ON CXR: ICD-10-CM

## 2018-12-03 DIAGNOSIS — R91.8 PULMONARY NODULES: ICD-10-CM

## 2018-12-03 DIAGNOSIS — D72.829 LEUKOCYTOSIS, UNSPECIFIED TYPE: ICD-10-CM

## 2018-12-03 LAB
ALBUMIN SERPL-MCNC: 3 G/DL (ref 3.4–5)
ALP SERPL-CCNC: 52 U/L (ref 40–150)
ALT SERPL W P-5'-P-CCNC: 26 U/L (ref 0–50)
ANION GAP SERPL CALCULATED.3IONS-SCNC: 7 MMOL/L (ref 3–14)
AST SERPL W P-5'-P-CCNC: 17 U/L (ref 0–45)
BASOPHILS # BLD AUTO: 0 10E9/L (ref 0–0.2)
BASOPHILS NFR BLD AUTO: 0.3 %
BILIRUB SERPL-MCNC: 0.2 MG/DL (ref 0.2–1.3)
BUN SERPL-MCNC: 22 MG/DL (ref 7–30)
CALCIUM SERPL-MCNC: 8.7 MG/DL (ref 8.5–10.1)
CHLORIDE SERPL-SCNC: 107 MMOL/L (ref 94–109)
CO2 SERPL-SCNC: 23 MMOL/L (ref 20–32)
CREAT SERPL-MCNC: 0.7 MG/DL (ref 0.52–1.04)
DIFFERENTIAL METHOD BLD: ABNORMAL
EOSINOPHIL # BLD AUTO: 0.2 10E9/L (ref 0–0.7)
EOSINOPHIL NFR BLD AUTO: 1.4 %
ERYTHROCYTE [DISTWIDTH] IN BLOOD BY AUTOMATED COUNT: 13.5 % (ref 10–15)
GFR SERPL CREATININE-BSD FRML MDRD: >90 ML/MIN/1.7M2
GLUCOSE SERPL-MCNC: 86 MG/DL (ref 70–99)
HCT VFR BLD AUTO: 30.6 % (ref 35–47)
HGB BLD-MCNC: 10.3 G/DL (ref 11.7–15.7)
IMM GRANULOCYTES # BLD: 0.1 10E9/L (ref 0–0.4)
IMM GRANULOCYTES NFR BLD: 0.3 %
INTERPRETATION ECG - MUSE: NORMAL
LYMPHOCYTES # BLD AUTO: 2.6 10E9/L (ref 0.8–5.3)
LYMPHOCYTES NFR BLD AUTO: 16.9 %
MCH RBC QN AUTO: 29.8 PG (ref 26.5–33)
MCHC RBC AUTO-ENTMCNC: 33.7 G/DL (ref 31.5–36.5)
MCV RBC AUTO: 88 FL (ref 78–100)
MONOCYTES # BLD AUTO: 0.5 10E9/L (ref 0–1.3)
MONOCYTES NFR BLD AUTO: 3.3 %
NEUTROPHILS # BLD AUTO: 11.9 10E9/L (ref 1.6–8.3)
NEUTROPHILS NFR BLD AUTO: 77.8 %
NRBC # BLD AUTO: 0 10*3/UL
NRBC BLD AUTO-RTO: 0 /100
PLATELET # BLD AUTO: 339 10E9/L (ref 150–450)
POTASSIUM SERPL-SCNC: 4.4 MMOL/L (ref 3.4–5.3)
PROT SERPL-MCNC: 7 G/DL (ref 6.8–8.8)
RBC # BLD AUTO: 3.46 10E12/L (ref 3.8–5.2)
SODIUM SERPL-SCNC: 137 MMOL/L (ref 133–144)
TROPONIN I SERPL-MCNC: <0.015 UG/L (ref 0–0.04)
WBC # BLD AUTO: 15.3 10E9/L (ref 4–11)

## 2018-12-03 PROCEDURE — 84484 ASSAY OF TROPONIN QUANT: CPT | Performed by: NURSE PRACTITIONER

## 2018-12-03 PROCEDURE — 71260 CT THORAX DX C+: CPT

## 2018-12-03 PROCEDURE — 93005 ELECTROCARDIOGRAM TRACING: CPT

## 2018-12-03 PROCEDURE — 25000128 H RX IP 250 OP 636: Performed by: NURSE PRACTITIONER

## 2018-12-03 PROCEDURE — 25000125 ZZHC RX 250: Performed by: NURSE PRACTITIONER

## 2018-12-03 PROCEDURE — 99285 EMERGENCY DEPT VISIT HI MDM: CPT | Mod: 25

## 2018-12-03 PROCEDURE — 85025 COMPLETE CBC W/AUTO DIFF WBC: CPT | Performed by: NURSE PRACTITIONER

## 2018-12-03 PROCEDURE — 80053 COMPREHEN METABOLIC PANEL: CPT | Performed by: NURSE PRACTITIONER

## 2018-12-03 RX ORDER — LEVOFLOXACIN 500 MG/1
500 TABLET, FILM COATED ORAL DAILY
Qty: 7 TABLET | Refills: 0 | Status: SHIPPED | OUTPATIENT
Start: 2018-12-03 | End: 2019-04-04

## 2018-12-03 RX ORDER — IOPAMIDOL 755 MG/ML
81 INJECTION, SOLUTION INTRAVASCULAR ONCE
Status: COMPLETED | OUTPATIENT
Start: 2018-12-03 | End: 2018-12-03

## 2018-12-03 RX ADMIN — SODIUM CHLORIDE, PRESERVATIVE FREE 102 ML: 5 INJECTION INTRAVENOUS at 15:16

## 2018-12-03 RX ADMIN — IOPAMIDOL 81 ML: 755 INJECTION, SOLUTION INTRAVENOUS at 15:16

## 2018-12-03 ASSESSMENT — ENCOUNTER SYMPTOMS
SHORTNESS OF BREATH: 1
DIZZINESS: 1

## 2018-12-03 NOTE — ED PROVIDER NOTES
"  History     Chief Complaint:  Chest Pain       HPI   Erica Gordon is a 28 year old female who presents with chest pain. The patient states that she was outside shoveling this morning when she had the sudden onset of shortness of breath with chest pain. She states that she then became dizzy and had a syncopal episode as she woke up in her driveway on the ground. The patient states that she has continued to feel short of breath with chest pain radiating through to in between her shoulder blades. She has never had similar pain previously.  The patient denies any chance of pregnancy.        PE/DVT Risk Factors   Personal History: Negative  Recent Travel: Negative   Recent Surgery/Hospitalization: Negative  Tobacco: Negative  Family History: Negative  Hormone Use: Negative   Cancer: Negative  Trauma: Negative       Allergies:  Rocephin  Penicillins     Medications:    Albuterol   Xanax  Qvar  Wellbutrin   Prozac  Mirena  Seroquel     Past Medical History:    Depression   Fibromyalgia   Mild intermittent asthma  Obesity    Past Surgical History:    History reviewed. No pertinent past surgical history.     Family History:    History reviewed. No pertinent family history.     Social History:  Marital Status: Single  Presents to the ED alone  Tobacco Use: Former Smoker who quit 5 months ago but previously had smoked for 13 years  Alcohol Use: No  PCP: Charlene Culver      Review of Systems   Respiratory: Positive for shortness of breath.    Cardiovascular: Positive for chest pain.   Neurological: Positive for dizziness and syncope.   All other systems reviewed and are negative.        Physical Exam   First Vitals:  BP: 145/85  Pulse: 129  Heart Rate: 107  Temp: 99.4  F (37.4  C)  Resp: 20  Height: 170.2 cm (5' 7\")  Weight: 118.8 kg (262 lb)  SpO2: 99 %      Physical Exam  Physical Exam   Constitutional:  Sitting up in bed, non-toxic appearing.   Head: Head moves freely with normal range of motion.   ENT: Oropharynx " is clear and moist.   Eyes: Conjunctivae pink. EOMs intact.   Neck: Normal range of motion.   Cardiovascular: Tachycardic rate and regular rhythm. Normal heart sounds. No concerning murmur. Intact distal pulses: radial pulses 2+ on the right, 2+ on the left.   Pulmonary/Chest: No respiratory distress. No wheezes. No rhonchi. No rales. Lungs decreased left lower lobe.   Abdominal: Soft. Non-tender. No rebound, no guarding.   Musculoskeletal: No peripheral edema. Distal capillary refill and sensation intact.   Neurological: Oriented to person, place, and time. No focal deficits.   Skin: Skin is warm and normal in color. No rash noted.      Emergency Department Course   ECG:  @ 1056  Indication: Chest Pain  Vent. Rate 127 bpm. NM interval 128 ms. QRS duration 86 ms. QT/QTc 318/462 ms. P-R-T axis 45 15 27.   Sinus tachycardia. Otherwise normal ECG.   Read in conjunction with Dr. Bridges.     Imaging:  Chest CT, Iv contrast only-PE protocol:   1. No pulmonary embolism demonstrated.  2. No thoracic aortic dissection or aneurysm.   3. Extensive right-sided pulmonary infiltrates.   4. 4 mm nodule in the left lower lobe.  Preliminary radiology read.     Radiographic findings were communicated with the patient who voiced understanding of the findings.    Laboratory:  CBC:  WBC 15.3 (H), HGB 10.3 (L),   CMP: Albumin 3.0 (L), otherwise WNL (Creatinine 0.70)   (1335) Troponin I: <0.015     Emergency Department Course:  Nursing notes and vitals reviewed.  (8372) I performed an exam of the patient as documented above.    EKG was done, interpretation as above.   A peripheral IV was established. Blood was drawn from the patient. This was sent for laboratory testing, findings above.    The patient was sent for a Chest CT while in the emergency department, findings above.    (1601) I consulted with radiology regarding the patient.   Findings and plan explained to the patient. Patient discharged home with instructions  regarding supportive care, medications, and reasons to return. The importance of close follow-up was reviewed.     Impression & Plan    Medical Decision Making:    Erica Gordon is a 28 year old female who presents for evaluation of shortness of breath and chest pain. She notes while shoveling snow she developed symptoms. Given her sudden onset and tachycardia, CT Chest PE study was performed and although negative for PE is consistent with pneumonia. Also a left lower lobe 4mm nodule was seen and we discussed this. Her WBC is 15.3. She has no hypotension. No concerns for sepsis at this time. She is afebrile here. Further work-up for chest pain was performed and her EKG has no ischemic findings and her troponin was negative. We discussed need for clinic follow up in 2-3 days. She will be started on Levaquin for CAP. She is amenable to plan.         Diagnosis:    ICD-10-CM    1. Pleuritic chest pain R07.81    2. Leukocytosis, unspecified type D72.829    3. Pulmonary infiltrate in right lung on CXR R91.8    4. Pneumonia of right lung due to infectious organism, unspecified part of lung J18.9    5. Pulmonary nodules R91.8        Disposition:  discharged to home    Discharge Medications:  New Prescriptions    LEVOFLOXACIN (LEVAQUIN) 500 MG TABLET    Take 1 tablet (500 mg) by mouth daily for 7 days         Tiff ALLEN am serving as a scribe on 12/3/2018 at 1:55 PM to personally document services performed by Nel Ugarte NP based on my observations and the provider's statements to me.    12/3/2018    EMERGENCY DEPARTMENT       Nel Ugarte APRN CNP  12/03/18 1210

## 2018-12-03 NOTE — DISCHARGE INSTRUCTIONS
Your CT Chest imaging shows a small 4mm lung nodule in the left lower lobe. Because you are a former smoker, you could have a repeat Chest CT in 12 months to recheck this nodule. Please discuss this with your Primary Care Provider.   You CT Chest also shows a right sided pneumonia. Please take the antibiotics as prescribed.   Follow up in clinic in 2-3 days for recheck.     Pneumonia (Adult)  Pneumonia is an infection deep within the lungs. It is in the small air sacs (alveoli). Pneumonia may be caused by a virus or bacteria. Pneumonia caused by bacteria is usually treated with an antibiotic. Severe cases may need to be treated in the hospital. Milder cases can be treated at home. Symptoms usually start to get better during the first 2 days of treatment.    Home care  Follow these guidelines when caring for yourself at home:    Rest at home for the first 2 to 3 days, or until you feel stronger. Don t let yourself get overly tired when you go back to your activities.    Stay away from cigarette smoke - yours or other people s.    You may use acetaminophen or ibuprofen to control fever or pain, unless another medicine was prescribed. If you have chronic liver or kidney disease, talk with your healthcare provider before using these medicines. Also talk with your provider if you ve had a stomach ulcer or gastrointestinal bleeding. Don t give aspirin to anyone younger than 18 years of age who is ill with a fever. It may cause severe liver damage.    Your appetite may be poor, so a light diet is fine.    Drink 6 to 8 glasses of fluids every day to make sure you are getting enough fluids. Beverages can include water, sport drinks, sodas without caffeine, juices, tea, or soup. Fluids will help loosen secretions in the lung. This will make it easier for you to cough up the phlegm (sputum). If you also have heart or kidney disease, check with your healthcare provider before you drink extra fluids.    Take antibiotic medicine  prescribed until it is all gone, even if you are feeling better after a few days.  Follow-up care  Follow up with your healthcare provider in the next 2 to 3 days, or as advised. This is to be sure the medicine is helping you get better.  If you are 65 or older, you should get a pneumococcal vaccine and a yearly flu (influenza) shot. You should also get these vaccines if you have chronic lung disease like asthma, emphysema, or COPD. Recently, a second type of pneumonia vaccine has become available for everyone over 65 years old. This is in addition to the previous vaccine. Ask your provider about this.  When to seek medical advice  Call your healthcare provider right away if any of these occur:    You don t get better within the first 48 hours of treatment    Shortness of breath gets worse    Rapid breathing (more than 25 breaths per minute)    Coughing up blood    Chest pain gets worse with breathing    Fever of 100.4 F (38 C) or higher that doesn t get better with fever medicine    Weakness, dizziness, or fainting that gets worse    Thirst or dry mouth that gets worse    Sinus pain, headache, or a stiff neck    Chest pain not caused by coughing  Date Last Reviewed: 1/1/2017 2000-2018 [employer]. [address] [place] [address], [place] [address]. All rights reserved. This information is not intended as a substitute for professional medical care. Always follow your healthcare professional's instructions.

## 2018-12-03 NOTE — ED AVS SNAPSHOT
Emergency Department    [address] [facility] [address]    Phone:  [phone]    Fax:  [phone]                                       Erica Gordon   MRN: [id number]    Department:   Emergency Department   Date of Visit:  12/3/2018           Patient Information     Date Of Birth          1990        Your diagnoses for this visit were:     Pleuritic chest pain     Leukocytosis, unspecified type     Pulmonary infiltrate in right lung on CXR     Pneumonia of right lung due to infectious organism, unspecified part of lung     Pulmonary nodules        You were seen by Nel Ugarte APRN CNP.      Follow-up Information     Follow up with Charlene Culver MD In 3 days.    Specialty:  Internal Medicine    Contact information:    [address] [facility] [id number] [phone]          Discharge Instructions       Your CT Chest imaging shows a small 4mm lung nodule in the left lower lobe. Because you are a former smoker, you could have a repeat Chest CT in 12 months to recheck this nodule. Please discuss this with your Primary Care Provider.   You CT Chest also shows a right sided pneumonia. Please take the antibiotics as prescribed.   Follow up in clinic in 2-3 days for recheck.     Pneumonia (Adult)  Pneumonia is an infection deep within the lungs. It is in the small air sacs (alveoli). Pneumonia may be caused by a virus or bacteria. Pneumonia caused by bacteria is usually treated with an antibiotic. Severe cases may need to be treated in the hospital. Milder cases can be treated at home. Symptoms usually start to get better during the first 2 days of treatment.    Home care  Follow these guidelines when caring for yourself at home:    Rest at home for the first 2 to 3 days, or until you feel stronger. Don t let yourself get overly tired when you go back to your activities.    Stay away from cigarette smoke - yours or other people s.    You may use acetaminophen or  ibuprofen to control fever or pain, unless another medicine was prescribed. If you have chronic liver or kidney disease, talk with your healthcare provider before using these medicines. Also talk with your provider if you ve had a stomach ulcer or gastrointestinal bleeding. Don t give aspirin to anyone younger than 18 years of age who is ill with a fever. It may cause severe liver damage.    Your appetite may be poor, so a light diet is fine.    Drink 6 to 8 glasses of fluids every day to make sure you are getting enough fluids. Beverages can include water, sport drinks, sodas without caffeine, juices, tea, or soup. Fluids will help loosen secretions in the lung. This will make it easier for you to cough up the phlegm (sputum). If you also have heart or kidney disease, check with your healthcare provider before you drink extra fluids.    Take antibiotic medicine prescribed until it is all gone, even if you are feeling better after a few days.  Follow-up care  Follow up with your healthcare provider in the next 2 to 3 days, or as advised. This is to be sure the medicine is helping you get better.  If you are 65 or older, you should get a pneumococcal vaccine and a yearly flu (influenza) shot. You should also get these vaccines if you have chronic lung disease like asthma, emphysema, or COPD. Recently, a second type of pneumonia vaccine has become available for everyone over 65 years old. This is in addition to the previous vaccine. Ask your provider about this.  When to seek medical advice  Call your healthcare provider right away if any of these occur:    You don t get better within the first 48 hours of treatment    Shortness of breath gets worse    Rapid breathing (more than 25 breaths per minute)    Coughing up blood    Chest pain gets worse with breathing    Fever of 100.4 F (38 C) or higher that doesn t get better with fever medicine    Weakness, dizziness, or fainting that gets worse    Thirst or dry mouth  that gets worse    Sinus pain, headache, or a stiff neck    Chest pain not caused by coughing  Date Last Reviewed: 1/1/2017 2000-2018 [employer]. [address] [place] [address], [place] [address]. All rights reserved. This information is not intended as a substitute for professional medical care. Always follow your healthcare professional's instructions.          Your next 10 appointments already scheduled     Dec 07, 2018  8:00 AM CST   SHORT with Merle Cox PA-C   [facility]    [address] [place] [id number] [phone]              24 Hour Appointment Hotline       To make an appointment at any [facility], call [phone] ([phone]). If you don't have a family doctor or clinic, we will help you find one. [facility] are conveniently located to serve the needs of you and your family.             Review of your medicines      START taking        Dose / Directions Last dose taken    levofloxacin 500 MG tablet   Commonly known as:  LEVAQUIN   Dose:  500 mg   Quantity:  7 tablet        Take 1 tablet (500 mg) by mouth daily for 7 days   Refills:  0          Our records show that you are taking the medicines listed below. If these are incorrect, please call your family doctor or clinic.        Dose / Directions Last dose taken    albuterol 108 (90 Base) MCG/ACT inhaler   Commonly known as:  VENTOLIN HFA   Dose:  1-2 puff   Quantity:  18 g        Inhale 1-2 puffs into the lungs every 4 hours as needed for shortness of breath / dyspnea or wheezing   Refills:  0        ALPRAZolam 1 MG tablet   Commonly known as:  XANAX   Dose:  1 mg   Quantity:  30 tablet        Take 1 tablet (1 mg) by mouth daily as needed for anxiety   Refills:  0        beclomethasone HFA 80 MCG/ACT inhaler   Commonly known as:  QVAR REDIHALER   Dose:  2 puff   Quantity:  1 Inhaler        Inhale 2 puffs into the lungs 2 times  daily   Refills:  3        buPROPion 150 MG 24 hr tablet   Commonly known as:  WELLBUTRIN XL   Dose:  150 mg   Quantity:  90 tablet        Take 1 tablet (150 mg) by mouth every morning   Refills:  0        FLUoxetine 40 MG capsule   Commonly known as:  PROzac   Quantity:  60 capsule        TAKE 2 CAPSULES(80 MG) BY MOUTH DAILY   Refills:  0        levonorgestrel 20 MCG/24HR IUD   Commonly known as:  MIRENA   Dose:  1 each        1 each (20 mcg) by Intrauterine route continuous   Refills:  0        QUEtiapine 50 MG tablet   Commonly known as:  SEROquel   Dose:  50 mg   Quantity:  90 tablet        Take 1 tablet (50 mg) by mouth At Bedtime   Refills:  3                Prescriptions were sent or printed at these locations (1 Prescription)                   Other Prescriptions                Printed at Department/Unit printer (1 of 1)         levofloxacin (LEVAQUIN) 500 MG tablet                Procedures and tests performed during your visit     CBC with platelets differential    Chest CT, IV contrast only - PE protocol    Comprehensive metabolic panel    EKG 12-lead, tracing only    Troponin I      Orders Needing Specimen Collection     None      Pending Results     No orders found from 12/1/2018 to 12/4/2018.            Pending Culture Results     No orders found from 12/1/2018 to 12/4/2018.            Pending Results Instructions     If you had any lab results that were not finalized at the time of your Discharge, you can call the ED Lab Result RN at [phone]. You will be contacted by this team for any positive Lab results or changes in treatment. The nurses are available 7 days a week from 10A to 6:30P.  You can leave a message 24 hours per day and they will return your call.        Test Results From Your Hospital Stay        12/3/2018  1:59 PM      Component Results     Component Value Ref Range & Units Status    WBC 15.3 (H) 4.0 - 11.0 10e9/L Final    RBC Count 3.46 (L) 3.8 - 5.2 10e12/L Final    Hemoglobin  10.3 (L) 11.7 - 15.7 g/dL Final    Hematocrit 30.6 (L) 35.0 - 47.0 % Final    MCV 88 78 - 100 fl Final    MCH 29.8 26.5 - 33.0 pg Final    MCHC 33.7 31.5 - 36.5 g/dL Final    RDW 13.5 10.0 - 15.0 % Final    Platelet Count 339 150 - 450 10e9/L Final    Diff Method Automated Method  Final    % Neutrophils 77.8 % Final    % Lymphocytes 16.9 % Final    % Monocytes 3.3 % Final    % Eosinophils 1.4 % Final    % Basophils 0.3 % Final    % Immature Granulocytes 0.3 % Final    Nucleated RBCs 0 0 /100 Final    Absolute Neutrophil 11.9 (H) 1.6 - 8.3 10e9/L Final    Absolute Lymphocytes 2.6 0.8 - 5.3 10e9/L Final    Absolute Monocytes 0.5 0.0 - 1.3 10e9/L Final    Absolute Eosinophils 0.2 0.0 - 0.7 10e9/L Final    Absolute Basophils 0.0 0.0 - 0.2 10e9/L Final    Abs Immature Granulocytes 0.1 0 - 0.4 10e9/L Final    Absolute Nucleated RBC 0.0  Final         12/3/2018  2:26 PM      Component Results     Component Value Ref Range & Units Status    Sodium 137 133 - 144 mmol/L Final    Potassium 4.4 3.4 - 5.3 mmol/L Final    Chloride 107 94 - 109 mmol/L Final    Carbon Dioxide 23 20 - 32 mmol/L Final    Anion Gap 7 3 - 14 mmol/L Final    Glucose 86 70 - 99 mg/dL Final    Urea Nitrogen 22 7 - 30 mg/dL Final    Creatinine 0.70 0.52 - 1.04 mg/dL Final    GFR Estimate >90 >60 mL/min/1.7m2 Final    Non  GFR Calc    GFR Estimate If Black >90 >60 mL/min/1.7m2 Final    African American GFR Calc    Calcium 8.7 8.5 - 10.1 mg/dL Final    Bilirubin Total 0.2 0.2 - 1.3 mg/dL Final    Albumin 3.0 (L) 3.4 - 5.0 g/dL Final    Protein Total 7.0 6.8 - 8.8 g/dL Final    Alkaline Phosphatase 52 40 - 150 U/L Final    ALT 26 0 - 50 U/L Final    AST 17 0 - 45 U/L Final         12/3/2018  2:26 PM      Component Results     Component Value Ref Range & Units Status    Troponin I ES <0.015 0.000 - 0.045 ug/L Final    The 99th percentile for upper reference range is 0.045 ug/L.  Troponin values   in the range of 0.045 - 0.120 ug/L may be  associated with risks of adverse   clinical events.           12/3/2018  4:10 PM      Narrative     CT CHEST PULMONARY EMBOLISM WITH CONTRAST  12/3/2018 3:28 PM     HISTORY: Shortness of breath, tachycardic, pleuritic chest pain,  evaluate for pulmonary embolus.    COMPARISON: None.    TECHNIQUE: Volumetric helical acquisition of CT images of the chest  from the lung apices to the kidneys were acquired after the  administration of 81 mL Isovue-370  IV contrast. Radiation dose for  this scan was reduced using automated exposure control, adjustment of  the mA and/or kV according to patient size, or iterative  reconstruction technique.    FINDINGS: There is no pulmonary embolism. Extensive right lower lobe,  right middle lobe, and right upper lobe infiltrates. Left lung  essentially clear. Small hiatal hernia. Elongated 4 mm nodule in the  lateral left lower lobe image 133. The heart is not enlarged.   Thoracic aorta is unremarkable without evidence of dissection or  aneurysm. There is no pleural or pericardial effusion.  There is no  pneumothorax. Adrenal glands are normal.  Remainder of the visualized  upper abdomen is unremarkable.        Impression     IMPRESSION:   1. No pulmonary embolism demonstrated.  2. No thoracic aortic dissection or aneurysm.   3. Extensive right-sided pulmonary infiltrates.   4. 4 mm nodule in the left lower lobe.    Recommendations for one or multiple incidental lung nodules < 6mm :    Low risk patients: No routine follow-up.    High risk patients: Optional follow-up CT at 12 months; if  unchanged, no further follow-up.    *Low Risk: Minimal or absent history of smoking or other known risk  factors.  *Nonsolid (ground glass) or partly solid nodules may require longer  follow-up to exclude indolent adenocarcinoma.  *Recommendations based on Guidelines for the Management of Incidental  Pulmonary Nodules Detected at CT: From the Fleischner Society 2017,  Radiology 2017.    HANNAH MOORE MD                 Clinical Quality Measure: Blood Pressure Screening     Your blood pressure was checked while you were in the emergency department today. The last reading we obtained was  BP: 145/65 . Please read the guidelines below about what these numbers mean and what you should do about them.  If your systolic blood pressure (the top number) is less than 120 and your diastolic blood pressure (the bottom number) is less than 80, then your blood pressure is normal. There is nothing more that you need to do about it.  If your systolic blood pressure (the top number) is 120-139 or your diastolic blood pressure (the bottom number) is 80-89, your blood pressure may be higher than it should be. You should have your blood pressure rechecked within a year by a primary care provider.  If your systolic blood pressure (the top number) is 140 or greater or your diastolic blood pressure (the bottom number) is 90 or greater, you may have high blood pressure. High blood pressure is treatable, but if left untreated over time it can put you at risk for heart attack, stroke, or kidney failure. You should have your blood pressure rechecked by a primary care provider within the next 4 weeks.  If your provider in the emergency department today gave you specific instructions to follow-up with your doctor or provider even sooner than that, you should follow that instruction and not wait for up to 4 weeks for your follow-up visit.        Thank you for choosing [facility]       Thank you for choosing [facility] for your care. Our goal is always to provide you with excellent care. Hearing back from our patients is one way we can continue to improve our services. Please take a few minutes to complete the written survey that you may receive in the mail after you visit with us. Thank you!        Care EveryWhere ID     This is your Care EveryWhere ID. This could be used by other organizations to access your [facility] medical records  [id number]         Equal Access to Services     CYRUS ZAMORA : Jennifer Christian, michael palmer, moses coon. So [facility] [phone].    ATENCIÓN: Si habla español, tiene a bassett disposición servicios gratuitos de asistencia lingüística. Llame al [phone].    We comply with applicable federal civil rights laws and [place] laws. We do not discriminate on the basis of race, color, national origin, age, disability, sex, sexual orientation, or gender identity.            After Visit Summary       This is your record. Keep this with you and show to your community pharmacist(s) and doctor(s) at your next visit.

## 2018-12-03 NOTE — LETTER
December 3, 2018      To Whom It May Concern:      Erica Gordon was seen in our Emergency Department today, 12/03/18.  I expect her condition to improve over the next 2-3 days.  She may return to work/school when improved.    Sincerely,        Nel Ugarte NP          
NO VESICLES/ULCERS/THROAT RED

## 2018-12-03 NOTE — ED AVS SNAPSHOT
Emergency Department    [address] [place] [address]    Phone:  [phone]    Fax:  [phone]                                       Erica Gordon   MRN: [id number]    Department:   Emergency Department   Date of Visit:  12/3/2018           After Visit Summary Signature Page     I have received my discharge instructions, and my questions have been answered. I have discussed any challenges I see with this plan with the nurse or doctor.    ..........................................................................................................................................  Patient/Patient Representative Signature      ..........................................................................................................................................  Patient Representative Print Name and Relationship to Patient    ..................................................               ................................................  Date                                   Time    ..........................................................................................................................................  Reviewed by Signature/Title    ...................................................              ..............................................  Date                                               Time          22EPIC Rev 08/18

## 2018-12-11 DIAGNOSIS — F33.2 SEVERE EPISODE OF RECURRENT MAJOR DEPRESSIVE DISORDER, WITHOUT PSYCHOTIC FEATURES (H): ICD-10-CM

## 2018-12-11 RX ORDER — FLUOXETINE 40 MG/1
CAPSULE ORAL
Qty: 60 CAPSULE | Refills: 0 | Status: SHIPPED | OUTPATIENT
Start: 2018-12-11 | End: 2019-03-23

## 2018-12-11 NOTE — TELEPHONE ENCOUNTER
"Requested Prescriptions   Pending Prescriptions Disp Refills     FLUoxetine (PROZAC) 40 MG capsule [Pharmacy Med Name: FLUOXETINE 40MG CAPSULES]  Last Written Prescription Date:  11/06/2018  Last Fill Quantity: 60,  # refills: 0   Last Office Visit: 11/27/2018   Future Office Visit:      60 capsule 0     Sig: TAKE 2 CAPSULES(80 MG) BY MOUTH DAILY    SSRIs Protocol Failed - 12/11/2018  7:00 AM       Failed - PHQ-9 score less than 5 in past 6 months    Please review last PHQ-9 score.          Passed - Patient is age 18 or older       Passed - No active pregnancy on record       Passed - No positive pregnancy test in last 12 months       Passed - Recent (6 mo) or future (30 days) visit within the authorizing provider's specialty    Patient had office visit in the last 6 months or has a visit in the next 30 days with authorizing provider or within the authorizing provider's specialty.  See \"Patient Info\" tab in inbasket, or \"Choose Columns\" in Meds & Orders section of the refill encounter.              "

## 2018-12-22 DIAGNOSIS — F32.0 MAJOR DEPRESSIVE DISORDER, SINGLE EPISODE, MILD (H): Primary | ICD-10-CM

## 2018-12-22 DIAGNOSIS — F33.9 EPISODE OF RECURRENT MAJOR DEPRESSIVE DISORDER, UNSPECIFIED DEPRESSION EPISODE SEVERITY (H): ICD-10-CM

## 2018-12-22 NOTE — TELEPHONE ENCOUNTER
"Requested Prescriptions   Pending Prescriptions Disp Refills     buPROPion (WELLBUTRIN XL) 150 MG 24 hr tablet [Pharmacy Med Name: BUPROPION XL 150MG TABLETS (24 H)] 90 tablet 0    Last Written Prescription Date:  9/27/2018  Last Fill Quantity: 90,  # refills: 0   Last office visit: 11/27/2018 with prescribing provider:  11/27/2018   Future Office Visit:     Sig: TAKE 1 TABLET(150 MG) BY MOUTH EVERY MORNING    SSRIs Protocol Passed - 12/22/2018 12:25 PM  PHQ-9 SCORE 6/25/2018 9/27/2018 11/27/2018   PHQ-9 Total Score - - -   PHQ-9 Total Score 26 20 18     TANMAY-7 SCORE 4/30/2018 9/27/2018 11/27/2018   Total Score - - -   Total Score 20 20 21                Passed - Recent (12 mo) or future (30 days) visit within the authorizing provider's specialty    Patient had office visit in the last 12 months or has a visit in the next 30 days with authorizing provider or within the authorizing provider's specialty.  See \"Patient Info\" tab in inbasket, or \"Choose Columns\" in Meds & Orders section of the refill encounter.             Passed - Medication is Bupropion    If the medication is Bupropion (Wellbutrin), and the patient is taking for smoking cessation; OK to refill.         Passed - Patient is age 18 or older       Passed - No active pregnancy on record       Passed - No positive pregnancy test in last 12 months          "

## 2018-12-24 RX ORDER — BUPROPION HYDROCHLORIDE 150 MG/1
TABLET ORAL
Qty: 90 TABLET | Refills: 0 | Status: SHIPPED | OUTPATIENT
Start: 2018-12-24 | End: 2019-03-30

## 2019-03-05 ENCOUNTER — OFFICE VISIT (OUTPATIENT)
Dept: URGENT CARE | Facility: URGENT CARE | Age: 29
End: 2019-03-05
Payer: COMMERCIAL

## 2019-03-05 VITALS
HEART RATE: 89 BPM | TEMPERATURE: 97.3 F | DIASTOLIC BLOOD PRESSURE: 80 MMHG | RESPIRATION RATE: 16 BRPM | SYSTOLIC BLOOD PRESSURE: 118 MMHG | OXYGEN SATURATION: 99 %

## 2019-03-05 DIAGNOSIS — M26.609 TEMPOROMANDIBULAR JOINT DISORDER: ICD-10-CM

## 2019-03-05 DIAGNOSIS — R51.9 LEFT TEMPORAL HEADACHE: ICD-10-CM

## 2019-03-05 DIAGNOSIS — M26.622 ARTHRALGIA OF LEFT TEMPOROMANDIBULAR JOINT: Primary | ICD-10-CM

## 2019-03-05 LAB
CRP SERPL-MCNC: 7.3 MG/L (ref 0–8)
ERYTHROCYTE [SEDIMENTATION RATE] IN BLOOD BY WESTERGREN METHOD: 14 MM/H (ref 0–20)

## 2019-03-05 PROCEDURE — 86140 C-REACTIVE PROTEIN: CPT | Performed by: PHYSICIAN ASSISTANT

## 2019-03-05 PROCEDURE — 85652 RBC SED RATE AUTOMATED: CPT | Performed by: PHYSICIAN ASSISTANT

## 2019-03-05 PROCEDURE — 36415 COLL VENOUS BLD VENIPUNCTURE: CPT | Performed by: PHYSICIAN ASSISTANT

## 2019-03-05 PROCEDURE — 99214 OFFICE O/P EST MOD 30 MIN: CPT | Performed by: PHYSICIAN ASSISTANT

## 2019-03-05 RX ORDER — NAPROXEN 500 MG/1
500 TABLET ORAL 2 TIMES DAILY WITH MEALS
Qty: 30 TABLET | Refills: 0 | Status: SHIPPED | OUTPATIENT
Start: 2019-03-05 | End: 2019-04-04

## 2019-03-05 RX ORDER — METHOCARBAMOL 750 MG/1
750 TABLET, FILM COATED ORAL 3 TIMES DAILY PRN
Qty: 21 TABLET | Refills: 0 | Status: SHIPPED | OUTPATIENT
Start: 2019-03-05 | End: 2019-04-04

## 2019-03-05 NOTE — LETTER
March 5, 2019      Erica Gordon  [address] [facility] [address]        To Whom It May Concern:    Erica Gordon  was seen on 3/2/2019.  Please excuse her due to illness.        Sincerely,        Sylvester Castellon PA-C

## 2019-03-05 NOTE — PATIENT INSTRUCTIONS
Patient Education     Self-Care for Temporomandibular Disorders (TMD)    You have temporomandibular disorder (TMD). This term describes a group of problems related to the temporomandibular joint (TMJ) and nearby muscles. The TMJ is located where the upper and lower jaws meet. Treatment will get your jaw back to normal function. But your care doesn t end there. Once you ve had TMD, it s important to avoid reinjury. Get in the habit of doing self-checks. This can make you aware of any symptoms that begin to return, so you can take action right away.  Doing self-checks  Make it a habit to assess your body a few times each day. Try writing yourself a reminder. Or set an alarm on your watch or computer. When doing a self-check, ask yourself:    Do I feel stressed?    Are my muscles tense?    Am I grinding or clenching my teeth?    Is my posture healthy for my body?    Is there anything I can do to make myself more comfortable?  If you answer yes to any of the questions above, you need to take action. Changing your posture or taking a short break can help prevent or relieve TMD symptoms.  Listening to your body  Many people get used to ignoring pain. But pain is a signal that your body needs care. To maintain your TMJ health:    Don t eat hard or chewy foods. Even if you feel fine, eating such foods can trigger symptoms again.    Be aware of your body. Don t ignore TMD symptoms. The nagging pain in your neck or jaw may be a sign that you need care.    Keep follow-up appointments. Be sure to keep all appointments with your healthcare team.                                                                                                                                Managing stress  Stress is a key factor in TMD. Stress can make you clench your muscles or grind your teeth. It can also affect your sleep, reducing your body s ability to heal. Here are a few tips to manage stress:    Learn ways to relax. Try listening to  music or gently stretching. Take a few slow deep breaths. Or, close your eyes and imagine a place or object that is calming.    Get plenty of rest and sleep.    Set goals you know you can attain.    Make time for people and things you enjoy.    Ask for help if you need it. Friends and family can run errands and cook meals for you.   Staying active  Activity helps the body in many ways. You stay looser and more relaxed. It also helps keep muscles and tissues conditioned. That way you can heal faster and make reinjury less likely. Here are some tips to get you started:    Talk with your healthcare provider before starting an exercise program.    Always warm up and stretch before each activity. This helps prevent injury.    Try walking or swimming. These activities are easy on your joints. They also benefit your heart and lungs.    Try yoga or jett chi. These are relaxing activities known for reducing stress.  Date Last Reviewed: 8/1/2017 2000-2018 [employer]. [address] [place] [address]. All rights reserved. This information is not intended as a substitute for professional medical care. Always follow your healthcare professional's instructions.           Patient Education     Helping Your Temporomandibular Joint (TMJ) Heal    The temporomandibular joint (TMJ) is a ball-and-socket joint located where the upper and lower jaws meet. When the TMJ and related muscles are injured, they need time to heal. Self-care is very important. You can take steps to reduce pressure on the TMJ and speed healing.  Eating with care  Chewing strains the TMJ. When symptoms are bad, you may not be able to chew at all. To get you through times when your symptoms are at their worst, try these tips:    Choose soft foods. These include scrambled eggs, oatmeal, yogurt, quiche, tofu, soup, smoothies, pasta, fish, mashed potatoes, milkshakes, bananas, applesauce, gelatin, or ice cream.    Don t bite into hard foods.  These include whole apples, carrots, and corn on the cob. Instead cut foods into bite-sized pieces.    Grind or finely chop meats and other tough foods. Try hamburger meat instead of steak.  Using ice and heat  Your healthcare provider may suggest using ice and heat. Ice helps reduce swelling and pain. Heat helps relax muscles, increasing blood flow.    Use a gel pack or cold pack for severe pain. Apply for 10 to 20 minutes. Repeat as needed. To make a cold pack, put ice cubes in a plastic bag that seals at the top. Wrap the bag in a clean, thin towel or cloth. Never put ice or a cold pack directly on the skin.    Use moist heat for mild to moderate muscle pain. Apply a moist, warm towel to the muscles for 10 to 20 minutes. Repeat as needed.  Staying away from triggers  Certain activities (called triggers) strain the TMJ, making symptoms worse. The tips below can help you avoid common triggers and limit strain.    Don t eat hard or chewy foods. These include nuts, pretzels, popcorn, chips, gum, caramel, gummy candies, carrots, whole apples, hard breads, and even ice.    Reschedule routine dental visits, like cleanings, if your jaw aches. If you have severe pain, call your healthcare provider.    Support your jaw when yawning. When you feel a yawn coming on, put a fist under your jaw. Apply gentle pressure. This helps prevent wide, painful yawns.    Don t do any activity that hurts. This includes nail biting, yelling, and singing.  Maintaining good posture  Work at improving your posture during the day and when you sleep. Good posture can help your body heal. Try these tips:    Use a headset when on the phone. Don t cradle the phone with your shoulder.    Keep ergonomics in mind. This includes making sure your workstation fits your body. Support your lower back. Take breaks often to stretch and rest. If you use a computer, keep the monitor at eye level.    Keep your head in a neutral position.  Keep your ears in line  with your shoulders. Don t slouch or crane your head forward.    Use an orthopedic pillow. Use this to support your head and neck during sleep.  Date Last Reviewed: 8/1/2017 2000-2018 [employer]. [address] [place] [address], [place] [address]. All rights reserved. This information is not intended as a substitute for professional medical care. Always follow your healthcare professional's instructions.           Patient Education   Treating TMJ Disorders with Heat and Cold  Heat therapy  Use heat for comfort during exercise. Heat increases blood flow and helps the tissues to relax. This makes movement easier. Moving the muscles and joints will restore them to full function.  You may stretch during heat therapy if it does not increase your pain, or if your therapist asks you to. After heat therapy, exercise as directed.  To apply heat, do one of the following:    Use a Bed Buddy Hot and Cold Pack over the jaw muscles. You may buy this from the drug store. Follow the heating directions on the package.    Use a damp hand towel.  1. Wring out the towel and place it in a microwave for 20 to 30 seconds.  2. Gently shake out any hot spots before placing it over the jaw muscles. Be sure to include the muscles above and below the jaw joints.  3. Reheat as needed for a total heating time of 15 to 20 minutes.  Cold therapy  Cold should be your first treatment for the first 24 to 48 hours after an injury or surgery.  You can also use cold to relieve inflammation (when your jaw feels swollen, tender and warmer than normal). It helps with pain and discomfort caused by exercise or severe muscle spasms. If you use cold before doing gentle stretches, it may help prevent pain.  To apply cold, try one of the following:    Buy a cold gel-pack from the drug store. Wrap it in a damp towel before placing it on the skin.    Use a plastic bag filled with ice.  1. Fill a Ziplock bag with ice chips and wrap it in damp towel or  washcloth.  2. Place the ice pack over the jaw muscles. Be sure to include the muscles above and below the jaw joints.  3. Hold it in place 5 to 10 minutes or until the skin feels numb.    Use ice massage.  1. Freeze water in a small paper or Styrofoam cup.  2. Remove the paper from the rim of the cup.  3. Stroke the ice over the muscles and joints. Stop when the area feels numb.  When using cold therapy, you will notice that the skin will first feel cold, then painful. The feeling will progress to a deep ache and finally to numbness.   When you are numb, remove the cold.  Contrast packs  The use of contrast packs--switching off between heat and cold--helps to reduce severe muscle spasm.  1. First use heat for 4 minutes, then cold for 1 minute.  2. Keep changing from hot to cold for 20 to 30 minutes.  3. End with the use of heat for 10 minutes.  For informational purposes only. Not to replace the advice of your health care provider.   Copyright   2007 [facility]. All rights reserved. [employer] 375540 - REV 06/15.

## 2019-03-16 NOTE — PROGRESS NOTES
SUBJECTIVE:  Chief Complaint   Patient presents with     Urgent Care     Left ear pain     Erica Grodon is a 28 year old female presents with a chief complaint of left side TMJ and ear pain  The injury occurred 1 week(s) ago.   How: No injury.  The patient complained of mild and moderate pain  and has had decreased ROM.  Pain exacerbated by movement.  Relieved by rest.  She treated it initially with no therapy. This is the first time this type of injury has occurred to this patient.     Past Medical History:   Diagnosis Date     Depression      Fibromyalgia      Mild intermittent asthma      Obesity (BMI 30-39.9)      Allergies   Allergen Reactions     Rocephin [Ceftriaxone Sodium] Anaphylaxis     Penicillins Hives     Latex Hives and Rash     Social History     Tobacco Use     Smoking status: Former Smoker     Packs/day: 0.50     Years: 12.00     Pack years: 6.00     Types: Cigarettes     Smokeless tobacco: Former User     Quit date: 10/26/2018     Tobacco comment: 4-5 cigs/day as of 1/8/18   Substance Use Topics     Alcohol use: No     Alcohol/week: 0.0 oz       ROS:  CONSTITUTIONAL:NEGATIVE for fever, chills, change in weight  INTEGUMENTARY/SKIN: NEGATIVE for worrisome rashes, moles or lesions  EYES: NEGATIVE for vision changes or irritation  ENT/MOUTH: POSITIVE for ear pain left  RESP:NEGATIVE for significant cough or SOB  CV: NEGATIVE for chest pain, palpitations or peripheral edema  GI: NEGATIVE for nausea, abdominal pain, heartburn, or change in bowel habits  : negative for and dysuria  MUSCULOSKELETAL: POSITIVE for left ear pain  NEURO: NEGATIVE for weakness, dizziness or paresthesias    EXAM:   /80   Pulse 89   Temp 97.3  F (36.3  C) (Tympanic)   Resp 16   SpO2 99%   Gen: healthy, alert, active and healthy,alert,no distress  Extremity: full ROM of extermiteis   There is not compromise to the distal circulation.  Pulses are +2 and CRT is brisk  GENERAL APPEARANCE: healthy, alert and no  distress  NECK: supple, non-tender to palpation, FROM   CHEST: clear to auscultation  CV: regular rate and rhythm  EXTREMITIES: peripheral pulses normal  MS:  Positive for left TMJ tenderness with opening jaw and chewing  SKIN: no suspicious lesions or rashes  NEURO: Normal strength and tone, sensory exam grossly normal, mentation intact and speech normal      ASSESSMENT/PLAN:    ICD-10-CM    1. Arthralgia of left temporomandibular joint M26.622 methocarbamol (ROBAXIN) 750 MG tablet     naproxen (NAPROSYN) 500 MG tablet     acetaminophen-codeine (TYLENOL #3) 300-30 MG tablet   2. Left temporal headache R51 Erythrocyte sedimentation rate auto     CRP, inflammation     methocarbamol (ROBAXIN) 750 MG tablet     naproxen (NAPROSYN) 500 MG tablet     acetaminophen-codeine (TYLENOL #3) 300-30 MG tablet   3. Temporomandibular joint disorder M26.609 methocarbamol (ROBAXIN) 750 MG tablet     naproxen (NAPROSYN) 500 MG tablet     acetaminophen-codeine (TYLENOL #3) 300-30 MG tablet       TMJ information given to patient  Advised to follow up with PCP  Information and treatment of TMJ given to patient

## 2019-03-23 DIAGNOSIS — F33.2 SEVERE EPISODE OF RECURRENT MAJOR DEPRESSIVE DISORDER, WITHOUT PSYCHOTIC FEATURES (H): ICD-10-CM

## 2019-03-24 NOTE — TELEPHONE ENCOUNTER
"Requested Prescriptions   Pending Prescriptions Disp Refills     FLUoxetine (PROZAC) 40 MG capsule [Pharmacy Med Name: FLUOXETINE 40MG CAPSULES] 60 capsule 0    Last Written Prescription Date:  12/11/2018  Last Fill Quantity: 60,  # refills: 0   Last office visit: 11/27/2018 with prescribing provider:  11/27/2018   Future Office Visit:     Sig: TAKE 2 CAPSULES(80 MG) BY MOUTH DAILY    SSRIs Protocol Failed - 3/23/2019  5:48 PM       Failed - PHQ-9 score less than 5 in past 6 months    Please review last PHQ-9 score.   PHQ-9 SCORE 6/25/2018 9/27/2018 11/27/2018   PHQ-9 Total Score - - -   PHQ-9 Total Score 26 20 18     TANMAY-7 SCORE 4/30/2018 9/27/2018 11/27/2018   Total Score - - -   Total Score 20 20 21                  Passed - Medication is active on med list       Passed - Patient is age 18 or older       Passed - No active pregnancy on record       Passed - No positive pregnancy test in last 12 months       Passed - Recent (6 mo) or future (30 days) visit within the authorizing provider's specialty    Patient had office visit in the last 6 months or has a visit in the next 30 days with authorizing provider or within the authorizing provider's specialty.  See \"Patient Info\" tab in inbasket, or \"Choose Columns\" in Meds & Orders section of the refill encounter.              "

## 2019-03-25 RX ORDER — FLUOXETINE 40 MG/1
CAPSULE ORAL
Qty: 60 CAPSULE | Refills: 0 | Status: SHIPPED | OUTPATIENT
Start: 2019-03-25 | End: 2019-04-04

## 2019-03-30 DIAGNOSIS — F33.9 EPISODE OF RECURRENT MAJOR DEPRESSIVE DISORDER, UNSPECIFIED DEPRESSION EPISODE SEVERITY (H): ICD-10-CM

## 2019-03-30 NOTE — TELEPHONE ENCOUNTER
"Requested Prescriptions   Pending Prescriptions Disp Refills     buPROPion (WELLBUTRIN XL) 150 MG 24 hr tablet [Pharmacy Med Name: BUPROPION XL 150MG TABLETS (24 H)] 90 tablet 0    Last Written Prescription Date:  12/24/2018  Last Fill Quantity: 90,  # refills: 0   Last office visit: 11/27/2018 with prescribing provider:  11/27/2018   Future Office Visit:     Sig: TAKE 1 TABLET(150 MG) BY MOUTH EVERY MORNING    SSRIs Protocol Failed - 3/30/2019  4:31 PM       Failed - PHQ-9 score less than 5 in past 6 months    Please review last PHQ-9 score.   PHQ-9 SCORE 6/25/2018 9/27/2018 11/27/2018   PHQ-9 Total Score - - -   PHQ-9 Total Score 26 20 18     TANMAY-7 SCORE 4/30/2018 9/27/2018 11/27/2018   Total Score - - -   Total Score 20 20 21                  Passed - Medication is Bupropion    If the medication is Bupropion (Wellbutrin), and the patient is taking for smoking cessation; OK to refill.         Passed - Medication is active on med list       Passed - Patient is age 18 or older       Passed - No active pregnancy on record       Passed - No positive pregnancy test in last 12 months       Passed - Recent (6 mo) or future (30 days) visit within the authorizing provider's specialty    Patient had office visit in the last 6 months or has a visit in the next 30 days with authorizing provider or within the authorizing provider's specialty.  See \"Patient Info\" tab in inbasket, or \"Choose Columns\" in Meds & Orders section of the refill encounter.              "

## 2019-04-01 RX ORDER — BUPROPION HYDROCHLORIDE 150 MG/1
TABLET ORAL
Qty: 90 TABLET | Refills: 0 | Status: SHIPPED | OUTPATIENT
Start: 2019-04-01 | End: 2019-04-04

## 2019-04-04 ENCOUNTER — OFFICE VISIT (OUTPATIENT)
Dept: INTERNAL MEDICINE | Facility: CLINIC | Age: 29
End: 2019-04-04
Payer: COMMERCIAL

## 2019-04-04 VITALS
DIASTOLIC BLOOD PRESSURE: 96 MMHG | WEIGHT: 285.7 LBS | HEART RATE: 93 BPM | RESPIRATION RATE: 18 BRPM | OXYGEN SATURATION: 98 % | BODY MASS INDEX: 44.75 KG/M2 | SYSTOLIC BLOOD PRESSURE: 152 MMHG

## 2019-04-04 DIAGNOSIS — F41.1 GAD (GENERALIZED ANXIETY DISORDER): ICD-10-CM

## 2019-04-04 DIAGNOSIS — J45.40 MODERATE PERSISTENT ASTHMA WITHOUT COMPLICATION: ICD-10-CM

## 2019-04-04 DIAGNOSIS — R03.0 ELEVATED BLOOD PRESSURE READING WITHOUT DIAGNOSIS OF HYPERTENSION: ICD-10-CM

## 2019-04-04 DIAGNOSIS — F33.2 SEVERE EPISODE OF RECURRENT MAJOR DEPRESSIVE DISORDER, WITHOUT PSYCHOTIC FEATURES (H): Primary | ICD-10-CM

## 2019-04-04 PROCEDURE — 99214 OFFICE O/P EST MOD 30 MIN: CPT | Performed by: INTERNAL MEDICINE

## 2019-04-04 RX ORDER — ALBUTEROL SULFATE 90 UG/1
1-2 AEROSOL, METERED RESPIRATORY (INHALATION) EVERY 4 HOURS PRN
Qty: 18 G | Refills: 0 | Status: SHIPPED | OUTPATIENT
Start: 2019-04-04 | End: 2020-08-13

## 2019-04-04 RX ORDER — FLUOXETINE 40 MG/1
80 CAPSULE ORAL DAILY
Qty: 180 CAPSULE | Refills: 0 | Status: SHIPPED | OUTPATIENT
Start: 2019-04-04 | End: 2020-08-13

## 2019-04-04 RX ORDER — QUETIAPINE FUMARATE 100 MG/1
100 TABLET, FILM COATED ORAL AT BEDTIME
Qty: 90 TABLET | Refills: 0 | Status: SHIPPED | OUTPATIENT
Start: 2019-04-04 | End: 2019-06-19

## 2019-04-04 ASSESSMENT — ANXIETY QUESTIONNAIRES
5. BEING SO RESTLESS THAT IT IS HARD TO SIT STILL: NEARLY EVERY DAY
3. WORRYING TOO MUCH ABOUT DIFFERENT THINGS: NEARLY EVERY DAY
2. NOT BEING ABLE TO STOP OR CONTROL WORRYING: NEARLY EVERY DAY
GAD7 TOTAL SCORE: 21
IF YOU CHECKED OFF ANY PROBLEMS ON THIS QUESTIONNAIRE, HOW DIFFICULT HAVE THESE PROBLEMS MADE IT FOR YOU TO DO YOUR WORK, TAKE CARE OF THINGS AT HOME, OR GET ALONG WITH OTHER PEOPLE: EXTREMELY DIFFICULT
6. BECOMING EASILY ANNOYED OR IRRITABLE: NEARLY EVERY DAY
7. FEELING AFRAID AS IF SOMETHING AWFUL MIGHT HAPPEN: NEARLY EVERY DAY
1. FEELING NERVOUS, ANXIOUS, OR ON EDGE: NEARLY EVERY DAY

## 2019-04-04 ASSESSMENT — PATIENT HEALTH QUESTIONNAIRE - PHQ9
5. POOR APPETITE OR OVEREATING: NEARLY EVERY DAY
SUM OF ALL RESPONSES TO PHQ QUESTIONS 1-9: 22

## 2019-04-04 NOTE — PATIENT INSTRUCTIONS
Prozac 80mg (two 40mg capsules) and Seroquel 100mg at night.    Controller inhaler (beclomethasone) 2 puffs TWICE daily EVERY DAY.    Albuterol inhaler sparingly as needed.     Follow-up in 6-8 weeks for mood, asthma, and blood pressure.

## 2019-04-04 NOTE — PROGRESS NOTES
SUBJECTIVE:                                                      HPI: Erica Gordon is a pleasant 28 year old female who presents for follow-up:    Patient reports that anxiety and depression were well controlled on Prozac 80 mg daily and Seroquel 100 mg at night. Had been tolerating both well - without adverse side effects. Ran out of both some time ago and, understandably, has had significant worsening of mood.     Tried Wellbutrin, but it made her feel irritable, so stopped.    Asthma is poorly controlled. Patient ran out of both her controller and rescue medications some time ago.    Finally, patient's blood pressure is noted to be elevated today. She is asymptomatic from a blood pressure perspective: no chest pain or palpitations, no shortness of breath, no light-headedness or dizziness, no headaches or vision changes.    The medication, allergy, and problem lists have been reviewed and updated as appropriate.     OBJECTIVE:                                                      BP (!) 152/96   Pulse 93   Resp 18   Wt 129.6 kg (285 lb 11.2 oz)   SpO2 98%   BMI 44.75 kg/m    Constitutional: well-appearing  Psych: normal judgment and insight; normal mood and affect; recent and remote memory intact    ASSESSMENT/PLAN:                                                      (F33.2) Severe episode of recurrent major depressive disorder, without psychotic features (H)  (primary encounter diagnosis)  (F41.1) TANMAY (generalized anxiety disorder)  Comment: poorly controlled off of medication.  Plan:    - RESTART Prozac 80mg daily and Seroquel 100mg daily.   - follow-up in 6-8 weeks (earlier as needed).     (J45.40) Moderate persistent asthma without complication  Comment: Poorly controlled off of medication.  Plan:    - RESTART QVar Redi-Inhaler 2 puffs twice daily consistently.   - albuterol sparingly as needed.    - follow-up in 6-8 weeks (earlier as needed).     (R03.0) Elevated blood pressure reading without  diagnosis of hypertension  Plan: follow-up in 6-8 weeks for blood pressure recheck.    The instructions on the AVS were discussed and explained to the patient. Patient expressed understanding of instructions.    (Chart documentation was completed, in part, with [employer] voice-recognition software. Even though reviewed, some grammatical, spelling, and word errors may remain.)    Charlene Culver MD   [address] [address]  T: [phone], F: [phone]

## 2019-04-05 ASSESSMENT — ANXIETY QUESTIONNAIRES: GAD7 TOTAL SCORE: 21

## 2019-06-19 DIAGNOSIS — F33.2 SEVERE EPISODE OF RECURRENT MAJOR DEPRESSIVE DISORDER, WITHOUT PSYCHOTIC FEATURES (H): ICD-10-CM

## 2019-06-19 DIAGNOSIS — J45.40 MODERATE PERSISTENT ASTHMA WITHOUT COMPLICATION: ICD-10-CM

## 2019-06-19 DIAGNOSIS — F33.9 EPISODE OF RECURRENT MAJOR DEPRESSIVE DISORDER, UNSPECIFIED DEPRESSION EPISODE SEVERITY (H): ICD-10-CM

## 2019-06-19 RX ORDER — QUETIAPINE FUMARATE 100 MG/1
TABLET, FILM COATED ORAL
Qty: 90 TABLET | Refills: 0 | Status: SHIPPED | OUTPATIENT
Start: 2019-06-19 | End: 2020-08-13

## 2019-06-19 RX ORDER — BECLOMETHASONE DIPROPIONATE HFA 80 UG/1
AEROSOL, METERED RESPIRATORY (INHALATION)
Qty: 31.8 G | Refills: 0 | Status: SHIPPED | OUTPATIENT
Start: 2019-06-19 | End: 2020-08-13

## 2019-06-19 RX ORDER — BUPROPION HYDROCHLORIDE 150 MG/1
TABLET ORAL
Qty: 90 TABLET | Refills: 0 | OUTPATIENT
Start: 2019-06-19

## 2019-06-19 NOTE — TELEPHONE ENCOUNTER
"  QUEtiapine (SEROQUEL) 100 MG tablet [Pharmacy Med Name: QUETIAPINE 100MG TABLETS] 90 tablet 0     Sig: TAKE 1 TABLET(100 MG) BY MOUTH AT BEDTIME       Antipsychotic Medications Failed - 6/19/2019  1:51 PM        Failed - Blood pressure under 140/90 in past 12 months     BP Readings from Last 3 Encounters:   04/04/19 (!) 152/96   03/05/19 118/80   12/03/18 132/86                 Failed - Lipid panel on file within the past 12 months     No lab results found.            Passed - Patient is 12 years of age or older        Passed - CBC on file in past 12 months     Recent Labs   Lab Test 12/03/18  1335   WBC 15.3*   RBC 3.46*   HGB 10.3*   HCT 30.6*                    Passed - Heart Rate on file within past 12 months     Pulse Readings from Last 3 Encounters:   04/04/19 93   03/05/19 89   12/03/18 107               Passed - A1c or Glucose on file in past 12 months     Recent Labs   Lab Test 12/03/18  1335   GLC 86       Please review patients last 3 weights. If a weight gain of >10 lbs exists, you may refill the prescription once after instructing the patient to schedule an appointment within the next 30 days.    Wt Readings from Last 3 Encounters:   04/04/19 129.6 kg (285 lb 11.2 oz)   12/03/18 118.8 kg (262 lb)   11/27/18 131.7 kg (290 lb 4.8 oz)             Passed - Medication is active on med list        Passed - Patient is not pregnant        Passed - No positve pregnancy test on file in past 12 months        Passed - Recent (6 mo) or future (30 days) visit within the authorizing provider's specialty     Patient had office visit in the last 6 months or has a visit in the next 30 days with authorizing provider or within the authorizing provider's specialty.  See \"Patient Info\" tab in inbasket, or \"Choose Columns\" in Meds & Orders section of the refill encounter.              Last Written Prescription Date:  4/4/2019  Last Fill Quantity: 90,  # refills: 0   Last office visit: 4/4/2019 with prescribing " provider:  Charlene Culver     Future Office Visit:      Routing refill request to provider for review/approval because:  BP not at goal    Na SOL RN, BSN, PHN

## 2019-06-19 NOTE — TELEPHONE ENCOUNTER
Bupropion request-sent back to pharmacy, medication change noted on 4/4/2019. Patient on Fluoxetine (Prozac)    Na SOL RN, BSN, PHN

## 2019-06-19 NOTE — TELEPHONE ENCOUNTER
"Requested Prescriptions   Pending Prescriptions Disp Refills     QVAR REDIHALER 80 MCG/ACT inhaler [Pharmacy Med Name: QVAR REDIHALER 80MCG ORALINH (120)] 31.8 g 0     Sig: INHALE 2 PUFFS INTO THE LUNGS TWICE DAILY       Inhaled Steroids Protocol Failed - 6/19/2019  2:40 PM        Failed - Asthma control assessment score within normal limits in last 6 months     Please review ACT score.           Passed - Patient is age 12 or older        Passed - Medication is active on med list        Passed - Recent (6 mo) or future (30 days) visit within the authorizing provider's specialty     Patient had office visit in the last 6 months or has a visit in the next 30 days with authorizing provider or within the authorizing provider's specialty.  See \"Patient Info\" tab in inbasket, or \"Choose Columns\" in Meds & Orders section of the refill encounter.            ACT Total Scores 2/7/2017 6/25/2018 9/27/2018   ACT TOTAL SCORE - - -   ASTHMA ER VISITS - - -   ASTHMA HOSPITALIZATIONS - - -   ACT TOTAL SCORE (Goal Greater than or Equal to 20) 20 11 23   In the past 12 months, how many times did you visit the emergency room for your asthma without being admitted to the hospital? 0 0 0   In the past 12 months, how many times were you hospitalized overnight because of your asthma? 0 0 0     Routing refill request to provider for review/approval because:  not current:  ACT        "

## 2019-11-01 ENCOUNTER — TELEPHONE (OUTPATIENT)
Dept: INTERNAL MEDICINE | Facility: CLINIC | Age: 29
End: 2019-11-01

## 2019-11-01 NOTE — TELEPHONE ENCOUNTER
Panel Management Review      Patient has the following on her problem list: None      Composite cancer screening  Chart review shows that this patient is due/due soon for the following None  Summary:    Patient is due/failing the following:   PHQ9    Action needed:   Patient needs to do PHQ9.    Type of outreach:    Sent [employer] message.    Questions for provider review:    None                                                                                                                                    Chiquita Us MA      Chart routed to self .

## 2020-08-13 ENCOUNTER — OFFICE VISIT (OUTPATIENT)
Dept: URGENT CARE | Facility: URGENT CARE | Age: 30
End: 2020-08-13

## 2020-08-13 VITALS
HEART RATE: 68 BPM | TEMPERATURE: 99.1 F | OXYGEN SATURATION: 63 % | WEIGHT: 286 LBS | BODY MASS INDEX: 44.89 KG/M2 | RESPIRATION RATE: 18 BRPM | HEIGHT: 67 IN | SYSTOLIC BLOOD PRESSURE: 140 MMHG | DIASTOLIC BLOOD PRESSURE: 77 MMHG

## 2020-08-13 DIAGNOSIS — M25.472 ANKLE JOINT EFFUSION, LEFT: ICD-10-CM

## 2020-08-13 DIAGNOSIS — L03.116 CELLULITIS OF LEFT FOOT: Primary | ICD-10-CM

## 2020-08-13 DIAGNOSIS — D64.9 ANEMIA, UNSPECIFIED TYPE: ICD-10-CM

## 2020-08-13 LAB
ANION GAP SERPL CALCULATED.3IONS-SCNC: 1 MMOL/L (ref 3–14)
BASOPHILS # BLD AUTO: 0 10E9/L (ref 0–0.2)
BASOPHILS NFR BLD AUTO: 0.3 %
BUN SERPL-MCNC: 14 MG/DL (ref 7–30)
CALCIUM SERPL-MCNC: 8.8 MG/DL (ref 8.5–10.1)
CHLORIDE SERPL-SCNC: 111 MMOL/L (ref 94–109)
CO2 SERPL-SCNC: 23 MMOL/L (ref 20–32)
CREAT SERPL-MCNC: 0.64 MG/DL (ref 0.52–1.04)
DIFFERENTIAL METHOD BLD: ABNORMAL
EOSINOPHIL # BLD AUTO: 0.3 10E9/L (ref 0–0.7)
EOSINOPHIL NFR BLD AUTO: 2.4 %
ERYTHROCYTE [DISTWIDTH] IN BLOOD BY AUTOMATED COUNT: 19 % (ref 10–15)
GFR SERPL CREATININE-BSD FRML MDRD: >90 ML/MIN/{1.73_M2}
GLUCOSE SERPL-MCNC: 112 MG/DL (ref 70–99)
HCT VFR BLD AUTO: 33.4 % (ref 35–47)
HGB BLD-MCNC: 9.9 G/DL (ref 11.7–15.7)
LYMPHOCYTES # BLD AUTO: 2.4 10E9/L (ref 0.8–5.3)
LYMPHOCYTES NFR BLD AUTO: 18.1 %
MCH RBC QN AUTO: 22.4 PG (ref 26.5–33)
MCHC RBC AUTO-ENTMCNC: 29.6 G/DL (ref 31.5–36.5)
MCV RBC AUTO: 76 FL (ref 78–100)
MONOCYTES # BLD AUTO: 0.8 10E9/L (ref 0–1.3)
MONOCYTES NFR BLD AUTO: 6.4 %
NEUTROPHILS # BLD AUTO: 9.5 10E9/L (ref 1.6–8.3)
NEUTROPHILS NFR BLD AUTO: 72.8 %
PLATELET # BLD AUTO: 416 10E9/L (ref 150–450)
POTASSIUM SERPL-SCNC: 4.7 MMOL/L (ref 3.4–5.3)
RBC # BLD AUTO: 4.42 10E12/L (ref 3.8–5.2)
SODIUM SERPL-SCNC: 135 MMOL/L (ref 133–144)
URATE SERPL-MCNC: 3.2 MG/DL (ref 2.6–6)
WBC # BLD AUTO: 13 10E9/L (ref 4–11)

## 2020-08-13 PROCEDURE — 36415 COLL VENOUS BLD VENIPUNCTURE: CPT | Performed by: INTERNAL MEDICINE

## 2020-08-13 PROCEDURE — 80048 BASIC METABOLIC PNL TOTAL CA: CPT | Performed by: INTERNAL MEDICINE

## 2020-08-13 PROCEDURE — 84550 ASSAY OF BLOOD/URIC ACID: CPT | Performed by: INTERNAL MEDICINE

## 2020-08-13 PROCEDURE — 99213 OFFICE O/P EST LOW 20 MIN: CPT | Performed by: INTERNAL MEDICINE

## 2020-08-13 PROCEDURE — 85025 COMPLETE CBC W/AUTO DIFF WBC: CPT | Performed by: INTERNAL MEDICINE

## 2020-08-13 RX ORDER — SULFAMETHOXAZOLE/TRIMETHOPRIM 800-160 MG
1 TABLET ORAL 2 TIMES DAILY
Qty: 28 TABLET | Refills: 0 | Status: SHIPPED | OUTPATIENT
Start: 2020-08-13 | End: 2020-08-27

## 2020-08-13 ASSESSMENT — PAIN SCALES - GENERAL: PAINLEVEL: SEVERE PAIN (7)

## 2020-08-13 ASSESSMENT — MIFFLIN-ST. JEOR: SCORE: 2039.92

## 2020-08-13 NOTE — PATIENT INSTRUCTIONS
We are finding some mild anemia on your blood counts today.  This has been present for a little while -- may be getting a little worse.  Whenever we see unexplained anemia we want to see if we can understand what might be causing it so it will be important to follow-up with Dr. Culver to check this out. Would be good for her to see you after the current infection is cleared up as that could obscure things.    Monitor for improvement of the infection: you should be seeing clear improvement within 48 hours.  If you are not then we should reassess.  If you develop fevers, chills, sweats or whole body feelings of a flu-like illness then you should go to ER as this could be concerning for a joint infection.  Also, if the joint should become more painful such that it is difficult to walk or bear weight then go to ER.

## 2020-08-13 NOTE — PROGRESS NOTES
"  SUBJECTIVE:                                                      HPI: Erica Gordon is a pleasant 28 year old female who presents for follow-up of depression, anxiety, panic attacks:    Currently on Prozac 80 mg daily and Xanax sparingly as needed for these.    Patient reports that \"things have gotten bad.\" Patient is tearful and choked up and declines explaining further.    TANMAY 7 and PHQ 9 scores indicate severe anxiety and depression currently.    Patient denies suicidal ideation.    FH significant for mother and maternal grandmother on Wellbutrin since her maternal uncle committed suicide.  Both mother and maternal grandmother report that Wellbutrin works well for them. Patient would like to try this.    Also needs refill of Xanax.    Was referred to therapy at last visit - did not schedule. Not interested in counseling at this time.    The medication, allergy, and problem lists have been reviewed and updated as appropriate.       OBJECTIVE:                                                      /86  Pulse 72  Temp 99.4  F (37.4  C) (Oral)  Resp 20  Wt 296 lb 3.2 oz (134.4 kg)  SpO2 98%  BMI 46.53 kg/m2  Constitutional: upset appearing, tearful and guarded      ASSESSMENT/PLAN:                                                      (F41.8) Depression with anxiety  (primary encounter diagnosis)  Comment: poorly controlled at present.  Plan:    - CONTINUE Prozac 80 mg daily; can possibly consider decreasing dose or stopping in the future.   - START Wellbutrin  mg daily.   - Xanax 1 mg sparingly as needed for severe anxiety/panic attacks.   - follow-up in ~6 weeks (earlier as needed).     The instructions on the AVS were discussed and explained to the patient. Patient expressed understanding of instructions.    (Chart documentation was completed, in part, with [employer] voice-recognition software. Even though reviewed, some grammatical, spelling, and word errors may remain.)    Charlene Culver MD "   [address] [address]  T: [phone], F: [phone]     Principal Discharge DX:	Vasovagal syncope

## 2020-08-13 NOTE — PROGRESS NOTES
"SUBJECTIVE:  Erica Gordon, a 30 year old female, presents for evaluation of swelling of the left ankle.  She dropped a 27 pound turkey on her foot 5 days ago and things were ok.  She says that she \"didn't notice\" any problems for several days until yesterday AM when pain and swelling were noticed.  She took ibuprofen 600 mg x 3 doses, iced, wrapped and elevated.  This didn't help. She is able to bear weight.  Issues are more with tenderness.     OBJECTIVE:  BP (!) 140/77 (BP Location: Right arm, Patient Position: Sitting, Cuff Size: Adult Regular)   Pulse 68   Temp 99.1  F (37.3  C) (Tympanic)   Resp 18   Ht 1.702 m (5' 7\")   Wt 129.7 kg (286 lb)   SpO2 (!) 63%   BMI 44.79 kg/m    GEN: obese female, no apparent distress   LEFT LOWER LE+ edema of the distal lower leg with no calf tenderness, negative Ziggy's  LEFT ANKLE: mild warmth and more pronounced redness of the dorsum of the left foot arising from the lateral malleolus; left ankle effusion and some pain with left ankle dorsiflexion but the bigger pain is related to just tenderness to palpation; soft tissue swelling across the dorsum of the left foot    LAB:  Recent Labs   Lab Test 20  17118  1335   WBC 13.0* 15.3*   RBC 4.42 3.46*   HGB 9.9* 10.3*   HCT 33.4* 30.6*   MCV 76* 88   MCH 22.4* 29.8   MCHC 29.6* 33.7   RDW 19.0* 13.5    339     Differential with a neutrophil predominance    Recent Labs   Lab Test 20  1711 18  1335    137   POTASSIUM 4.7 4.4   CHLORIDE 111* 107   CO2 23 23   BUN 14 22   CR 0.64 0.70   * 86      Uric acid is 3.2 mg/dL    ASSESSMENT/PLAN:    ICD-10-CM    1. Cellulitis of left foot  L03.116 sulfamethoxazole-trimethoprim (BACTRIM DS) 800-160 MG tablet   2. Ankle joint effusion, left  M25.472 CBC with platelets and differential     Uric acid     Basic metabolic panel  (Ca, Cl, CO2, Creat, Gluc, K, Na, BUN)   3. Anemia, unspecified type  D64.9    Considered differential diagnosis " of acute left ankle effusion and redness including gout, pseudogout, septic arthritis, cellulitis with sympathetic effusion.  The relative lack of actual joint tenderness argues against a major intra-articular inflammatory process although these diagnoses cannot be conclusively ruled out without synovial fluid analysis.  At this point, the likelihood is for a soft tissue infection.  Will treat empirically with TMP/SMX and the patient will monitor closely.  If worsening symptoms then go to ER.  Incidentally found some progression of her mild anemia with new drop in MCV.  This suggests a possible iron deficiency although anemia related to inflammation cannot be excluded.  This should be followed up by her PCP when current infection is resolved.  A repeat CBC, reticulocyte count, and iron studies would be helpful.  The patient states that she does not menstruate so no clear cause at this point.    Easton Romo MD

## 2020-08-13 NOTE — Clinical Note
Marques Culver,    Incidentally came across some gradual progression of mild anemia for Erica.  I advised that she follow-up with you.  Might be nice to get labs before the visit.    Thank you!  Víctor

## 2020-08-14 ENCOUNTER — NURSE TRIAGE (OUTPATIENT)
Dept: NURSING | Facility: CLINIC | Age: 30
End: 2020-08-14

## 2020-08-14 NOTE — TELEPHONE ENCOUNTER
Patient was treated for cellulitis and note documents that patient is able to bear weight. No mention of restrictions. I cannot document otherwise.     If shooting pain, may need to be reassessed for a fracture or sprain and may benefit from a hard boot to wear while at work.

## 2020-08-14 NOTE — TELEPHONE ENCOUNTER
Erica states that yesterday Erica was into urgent care for left ankle.  Erica states that she can only stand for five minutes and then pain starts shooting.  Erica is requesting a note for her work to be excused.  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  Please phone as soon as possible.      COVID 19 Nurse Triage Plan/Patient Instructions    Please be aware that novel coronavirus (COVID-19) may be circulating in the community. If you develop symptoms such as fever, cough, or SOB or if you have concerns about the presence of another infection including coronavirus (COVID-19), please contact your health care provider or visit [email or web address].     Disposition/Instructions    Home care recommended. Follow home care protocol based instructions.    Thank you for taking steps to prevent the spread of this virus.  o Limit your contact with others.  o Wear a simple mask to cover your cough.  o Wash your hands well and often.    Resources    [facility]: About COVID-19: [email or web address]    CDC: What to Do If You're Sick: www.cdc.gov/coronavirus/2019-ncov/about/steps-when-sick.html    CDC: Ending Home Isolation: www.cdc.gov/coronavirus/2019-ncov/hcp/disposition-in-home-patients.html     CDC: Caring for Someone: www.cdc.gov/coronavirus/2019-ncov/if-you-are-sick/care-for-someone.html     [facility]: Interim Guidance for Hospital Discharge to Home: www.health.[facility].[place]/diseases/coronavirus/hcp/hospdischarge.pdf    [facility] clinical trials (COVID-19 research studies): [email or web address]/umn-clinical-trials     Below are the COVID-19 hotlines at the [place] Department of Health ([facility]). Interpreters are available.   o For health questions: Call 161-896-6387 or 1-955.741.9863 (7 a.m. to 7 p.m.)  o For questions about schools and childcare: Call 570-683-2393 or  9-230-722-4390 (7 a.m. to 7 p.m.)                       Additional Information    Negative: Nursing judgment, per information in Reference    Negative: Information only call about a Well Adult (no illness or injury)    Negative: Nursing judgment or information in reference    Negative: Nursing judgment or information in reference    Negative: Nursing judgment or information in reference    Negative: Nursing judgment or information in reference    Negative: Nursing judgment or information in reference    Negative: Nursing judgment or information in reference    Negative: Nursing judgment or information in reference    Negative: Nursing judgment or information in reference    Negative: Nursing judgment or information in reference    Negative: Nursing judgment or information in reference    Negative: Nursing judgment or information in reference    Negative: Nursing judgment or information in reference    Negative: Nursing judgment or information in reference    Nursing judgment or information in reference    Protocols used: NO GUIDELINE [id number]

## 2020-08-16 ENCOUNTER — HOSPITAL ENCOUNTER (EMERGENCY)
Facility: CLINIC | Age: 30
Discharge: HOME | End: 2020-08-16
Attending: EMERGENCY MEDICINE | Admitting: EMERGENCY MEDICINE

## 2020-08-16 ENCOUNTER — OFFICE VISIT (OUTPATIENT)
Dept: URGENT CARE | Facility: URGENT CARE | Age: 30
End: 2020-08-16

## 2020-08-16 ENCOUNTER — APPOINTMENT (OUTPATIENT)
Dept: GENERAL RADIOLOGY | Facility: CLINIC | Age: 30
End: 2020-08-16
Attending: EMERGENCY MEDICINE

## 2020-08-16 VITALS
TEMPERATURE: 97.7 F | SYSTOLIC BLOOD PRESSURE: 168 MMHG | WEIGHT: 288 LBS | BODY MASS INDEX: 45.2 KG/M2 | RESPIRATION RATE: 18 BRPM | HEART RATE: 111 BPM | DIASTOLIC BLOOD PRESSURE: 107 MMHG | OXYGEN SATURATION: 99 % | HEIGHT: 67 IN

## 2020-08-16 VITALS — DIASTOLIC BLOOD PRESSURE: 102 MMHG | TEMPERATURE: 98.1 F | HEART RATE: 109 BPM | SYSTOLIC BLOOD PRESSURE: 152 MMHG

## 2020-08-16 DIAGNOSIS — S90.32XA CONTUSION OF LEFT FOOT, INITIAL ENCOUNTER: ICD-10-CM

## 2020-08-16 DIAGNOSIS — L03.119 CELLULITIS OF FOOT: Primary | ICD-10-CM

## 2020-08-16 LAB
ANION GAP SERPL CALCULATED.3IONS-SCNC: 4 MMOL/L (ref 3–14)
BASOPHILS # BLD AUTO: 0.1 10E9/L (ref 0–0.2)
BASOPHILS NFR BLD AUTO: 0.5 %
BUN SERPL-MCNC: 15 MG/DL (ref 7–30)
CALCIUM SERPL-MCNC: 8.5 MG/DL (ref 8.5–10.1)
CHLORIDE SERPL-SCNC: 108 MMOL/L (ref 94–109)
CO2 SERPL-SCNC: 25 MMOL/L (ref 20–32)
CREAT SERPL-MCNC: 0.77 MG/DL (ref 0.52–1.04)
DIFFERENTIAL METHOD BLD: ABNORMAL
EOSINOPHIL # BLD AUTO: 0.2 10E9/L (ref 0–0.7)
EOSINOPHIL NFR BLD AUTO: 1.7 %
ERYTHROCYTE [DISTWIDTH] IN BLOOD BY AUTOMATED COUNT: 19 % (ref 10–15)
GFR SERPL CREATININE-BSD FRML MDRD: >90 ML/MIN/{1.73_M2}
GLUCOSE SERPL-MCNC: 99 MG/DL (ref 70–99)
HCT VFR BLD AUTO: 31.3 % (ref 35–47)
HGB BLD-MCNC: 9.3 G/DL (ref 11.7–15.7)
IMM GRANULOCYTES # BLD: 0 10E9/L (ref 0–0.4)
IMM GRANULOCYTES NFR BLD: 0.3 %
LYMPHOCYTES # BLD AUTO: 1.6 10E9/L (ref 0.8–5.3)
LYMPHOCYTES NFR BLD AUTO: 16 %
MCH RBC QN AUTO: 21.9 PG (ref 26.5–33)
MCHC RBC AUTO-ENTMCNC: 29.7 G/DL (ref 31.5–36.5)
MCV RBC AUTO: 74 FL (ref 78–100)
MONOCYTES # BLD AUTO: 0.6 10E9/L (ref 0–1.3)
MONOCYTES NFR BLD AUTO: 5.7 %
NEUTROPHILS # BLD AUTO: 7.8 10E9/L (ref 1.6–8.3)
NEUTROPHILS NFR BLD AUTO: 75.8 %
PLATELET # BLD AUTO: 448 10E9/L (ref 150–450)
POTASSIUM SERPL-SCNC: 4.4 MMOL/L (ref 3.4–5.3)
RBC # BLD AUTO: 4.24 10E12/L (ref 3.8–5.2)
SODIUM SERPL-SCNC: 137 MMOL/L (ref 133–144)
WBC # BLD AUTO: 10.3 10E9/L (ref 4–11)

## 2020-08-16 PROCEDURE — 99284 EMERGENCY DEPT VISIT MOD MDM: CPT | Mod: 25

## 2020-08-16 PROCEDURE — 73630 X-RAY EXAM OF FOOT: CPT | Mod: LT

## 2020-08-16 PROCEDURE — 80048 BASIC METABOLIC PNL TOTAL CA: CPT | Performed by: EMERGENCY MEDICINE

## 2020-08-16 PROCEDURE — 85025 COMPLETE CBC W/AUTO DIFF WBC: CPT | Performed by: EMERGENCY MEDICINE

## 2020-08-16 PROCEDURE — 99214 OFFICE O/P EST MOD 30 MIN: CPT | Performed by: INTERNAL MEDICINE

## 2020-08-16 RX ORDER — IBUPROFEN 200 MG
800 TABLET ORAL PRN
COMMUNITY
End: 2021-10-16

## 2020-08-16 ASSESSMENT — ENCOUNTER SYMPTOMS
COLOR CHANGE: 1
CHILLS: 0
FEVER: 0
WOUND: 0

## 2020-08-16 ASSESSMENT — MIFFLIN-ST. JEOR: SCORE: 2048.99

## 2020-08-16 NOTE — PROGRESS NOTES
SUBJECTIVE:  Erica Gordon, a 30 year old female, presents for evaluation of worsening problems with the left foot.  She was seen here on 8/13/2020 for a presumed cellulitis of the left foot. At the time, there was some consideration that this may be an acute monoarthritis however she was walking without much pain and tolerated PROM of the ankle well suggesting that the extra-articular soft tissues were the primary site of the problem.  She did have a mildly elevated WBC on 8/13.  She was treated with oral TMP/SMX.  The patient returns with worsening swelling and throbbing pain and expansion of redness despite the Bactrim.  She denies fevers.  She continues to be able to walk and tolerates passive joint movement.    OBJECTIVE:  BP (!) 152/102   Pulse 109   Temp 98.1  F (36.7  C) (Tympanic)   GEN: obese female, tearful  LEFT FOOT and ANKLE: increased erythema that and edema and warmth that now involves the entire dorsum of the left foot; significantly progressed relative to 8/13    ASSESSMENT/PLAN:    ICD-10-CM    1. Cellulitis of foot  L03.119    Differential diagnosis continues to primarily focus on soft tissue infection due to usual organisms with cellulitis and tenosynovitis as possible diagnoses.  While this could be a septic arthritis or gout that is mimicking cellulitis her ability to walk and bear weight without substantial pain continues to suggest that the actual joint space is not involved.  Given the probability that this is a cellulitis that has failed oral therapy, the patient is referred to ER for further w/u including blood counts, culture and probable need for IV antibiotics.    Easton Romo MD

## 2020-08-16 NOTE — ED PROVIDER NOTES
"  History   Chief Complaint:  Wound Check    HPI   Erica Gordon is a 30 year old female, who was seen three days ago at urgent care and diagnosed with possible cellulitis of the left foot and placed on Bactrim BID. She was seen today at urgent care again as the pain was worsening along with increased erythema, however, she was told to go to the emergency department for further evaluation as they could have had the wrong diagnosis. The patient has a history of fibromyalgia. Today, the patient reports she dropped a frozen turkey on her left foot eight days ago and had erythema and pain begin about four days ago. The patient denies any ongoing fever or chills. She conveys she has not had any lacerations, puncture wounds, or injuries noted to the left foot. She does mention the foot is warm to touch. She can ambulate, although she does favor the foot. She does not have a history of diabetes.     Allergies:  Rocephin  Penicillins  Wellbutrin    Medications:    Mirena  Bactrim    Past Medical History:    Depression  Fibromyalgia  Asthma  Morbid obesity    Past Surgical History:    History reviewed. No pertinent surgical history.    Family History:    History reviewed. No pertinent family history.     Social History:  Smoking status: Former-10/26/18  Alcohol use: No  Drug use: No  PCP: Charlene Culver  Marital Status:  Single [1]    Review of Systems   Constitutional: Negative for chills and fever.   Skin: Positive for color change. Negative for wound.   All other systems reviewed and are negative.    Physical Exam     Patient Vitals for the past 24 hrs:   BP Temp Temp src Pulse Resp SpO2 Height Weight   08/16/20 1822 -- -- -- -- -- -- 1.702 m (5' 7\") 130.6 kg (288 lb)   08/16/20 1815 (!) 168/107 -- -- -- -- 99 % -- --   08/16/20 1813 (!) 186/107 97.7  F (36.5  C) Temporal 111 18 99 % 1.702 m (5' 7\") --       Physical Exam  Nursing note and vitals reviewed.    Constitutional:  Appears comfortable.    Cardiovascular: "  Normal rate, regular rhythm.     DP pulse palpable.  Cap refill less than 2 seconds.  Pulmonary:  Breath sounds clear to ascultation bilaterally. No wheezing or rales.   Musculoskeletal:  Range of motion in left foot is normal, ankle range of motion is normal.      There is no tenderness.  The foot is a dark red with some violaceous discoloration, looks like early bruising.  There is pitting edema but there is no tenderness.  The foot is slightly warm to touch.  Neurological:   Alert and oriented. Exhibits good muscle tone.      Sensation in the foot is normal.  Skin:    Skin is warm and dry.  Early bruising noted over the whole left foot with some erythema.  No red streaking up the leg.  Psychiatric:   Behavior is normal. Appropriate mood and affect.     Judgment and thought content normal.     Emergency Department Course   Imaging:  Radiology findings were communicated with the patient who voiced understanding of the findings.    XR Foot, 3 views, Left:  Dorsal soft tissue swelling. There is no definitive evidence of fracture. If symptoms persist, recommend follow-up radiograph in 7-14 days. Joint spaces are normal.     Imaging independently reviewed and agree with radiologist interpretation.       Laboratory:  Laboratory findings were communicated with the patient who voiced understanding of the findings.    CBC: HGB 9.3 (L), o/w WNL (WBC 10.3, )    BMP: WNL (Creatinine 0.77)    Emergency Department Course:  Past medical records, nursing notes, and vitals reviewed.    1815 I performed an exam of the patient as documented above.      IV was inserted and blood was drawn for laboratory testing, results above.  The patient was sent for a foot x-ray while in the emergency department, results above.     2040 I rechecked the patient and discussed the results of her workup thus far.     Findings and plan explained to the Patient. Patient discharged home with instructions regarding supportive care, medications,  and reasons to return. The importance of close follow-up was reviewed.    I personally reviewed the laboratory and imaging results with the Patient and answered all related questions prior to discharge.     Impression & Plan   Medical Decision Making:  Patient comes in with swelling and discoloration to the left foot after trauma 8 days ago.  X-rays negative.  Blood work is normal.  She has no tenderness, there is no red streaking up the leg.  I believe that this is bruising and the pink discoloration on parts of her foot I think is the skin stretching from the edema.  I am going to keep her off work for a couple of days so she can elevate her foot and she can finish the antibiotic although I do not see any reason that she would have an infection at this time.  She can go back to work on Wednesday.  She understands if she were to develop red streaking up the leg with fever she needs to return to the ER.    Off work the next 2 days, elevate, reduce your weightbearing significantly, Tylenol or ibuprofen as needed.  Finish the antibiotic.  If you develop fevers with worsening pain and red streaking up your leg, return to the emergency room.  Otherwise okay to go back to work on Wednesday, expect some swelling at the end of the day.    Diagnosis:    ICD-10-CM    1. Contusion of left foot, initial encounter  S90.32XA        Disposition:  Discharged to home.    Scribe Disclosure:  I, Ayo Ye, am serving as a scribe at 6:15 PM on 8/16/2020 to document services personally performed by Keyla Chery MD based on my observations and the provider's statements to me.        Keyla Chery MD  08/16/20 5754

## 2020-08-16 NOTE — PATIENT INSTRUCTIONS
I continue to believe that the problem in your foot is a cellulitis however it is getting worse despite appropriate oral antibiotic therapy.  This could indicate that we either have the wrong diagnosis (ie the problem may be a deeper infection) or the wrong treatment.  The best thing now will be for you to go to ER where you can get more diagnostic testing done and possibly some IV antibiotics.

## 2020-08-16 NOTE — ED AVS SNAPSHOT
Emergency Department  [address] [facility] [address]  Phone:  [phone]  Fax:  [phone]                                    Erica Gordon   MRN: [id number]    Department:   Emergency Department   Date of Visit:  8/16/2020           After Visit Summary Signature Page    I have received my discharge instructions, and my questions have been answered. I have discussed any challenges I see with this plan with the nurse or doctor.    ..........................................................................................................................................  Patient/Patient Representative Signature      ..........................................................................................................................................  Patient Representative Print Name and Relationship to Patient    ..................................................               ................................................  Date                                   Time    ..........................................................................................................................................  Reviewed by Signature/Title    ...................................................              ..............................................  Date                                               Time          22EPIC Rev 08/18

## 2020-08-16 NOTE — ED TRIAGE NOTES
Patient with worsening cellulitis in left foot. Started on Thursday, has become more painful and reddened. Sent from .

## 2020-08-17 NOTE — DISCHARGE INSTRUCTIONS
Off work the next 2 days, elevate, reduce your weightbearing significantly, Tylenol or ibuprofen as needed.  Finish the antibiotic.  If you develop fevers with worsening pain and red streaking up your leg, return to the emergency room.  Otherwise okay to go back to work on Wednesday, expect some swelling at the end of the day.

## 2020-09-20 ENCOUNTER — OFFICE VISIT (OUTPATIENT)
Dept: URGENT CARE | Facility: URGENT CARE | Age: 30
End: 2020-09-20

## 2020-09-20 VITALS
TEMPERATURE: 99.3 F | WEIGHT: 280 LBS | OXYGEN SATURATION: 97 % | HEIGHT: 67 IN | RESPIRATION RATE: 18 BRPM | BODY MASS INDEX: 43.95 KG/M2 | HEART RATE: 62 BPM | DIASTOLIC BLOOD PRESSURE: 89 MMHG | SYSTOLIC BLOOD PRESSURE: 138 MMHG

## 2020-09-20 DIAGNOSIS — R50.9 FEVER AND CHILLS: ICD-10-CM

## 2020-09-20 DIAGNOSIS — Z20.822 PERSON UNDER INVESTIGATION FOR COVID-19: Primary | ICD-10-CM

## 2020-09-20 DIAGNOSIS — J30.2 SEASONAL ALLERGIC RHINITIS, UNSPECIFIED TRIGGER: ICD-10-CM

## 2020-09-20 PROCEDURE — U0003 INFECTIOUS AGENT DETECTION BY NUCLEIC ACID (DNA OR RNA); SEVERE ACUTE RESPIRATORY SYNDROME CORONAVIRUS 2 (SARS-COV-2) (CORONAVIRUS DISEASE [COVID-19]), AMPLIFIED PROBE TECHNIQUE, MAKING USE OF HIGH THROUGHPUT TECHNOLOGIES AS DESCRIBED BY CMS-2020-01-R: HCPCS | Performed by: PHYSICIAN ASSISTANT

## 2020-09-20 PROCEDURE — 99214 OFFICE O/P EST MOD 30 MIN: CPT | Performed by: PHYSICIAN ASSISTANT

## 2020-09-20 RX ORDER — CETIRIZINE HYDROCHLORIDE 10 MG/1
10 TABLET ORAL DAILY
Qty: 30 TABLET | Refills: 1 | Status: SHIPPED | OUTPATIENT
Start: 2020-09-20 | End: 2020-10-20

## 2020-09-20 ASSESSMENT — MIFFLIN-ST. JEOR: SCORE: 2012.7

## 2020-09-20 NOTE — PROGRESS NOTES
Patient presents with:  Urgent Care  Consult: needs  release to work note       SUBJECTIVE:   Erica Gordon is a 30 year old female presenting with a chief complaint of: low grade fever since yesterday.  Her employer sent her here for a note to be able to return to work.    She denies any other symptoms.      She has children at home that are NOT in school or .    No ill exposures.      She works at [employer])  Current and Associated symptoms: fever  Treatment measures tried include None tried.  Predisposing factors include None.    H/o asthma and slight allergies    Past Medical History:   Diagnosis Date     Depression      Fibromyalgia      Mild intermittent asthma      Obesity (BMI 30-39.9)      Patient Active Problem List   Diagnosis     Fibromyalgia     Depression     Mild persistent asthma     Obesity (BMI 30-39.9)     Morbid obesity (H)     Social History     Tobacco Use     Smoking status: Former Smoker     Packs/day: 0.50     Years: 12.00     Pack years: 6.00     Types: Cigarettes     Smokeless tobacco: Former User     Quit date: 10/26/2018     Tobacco comment: 4-5 cigs/day as of 1/8/18   Substance Use Topics     Alcohol use: No     Alcohol/week: 0.0 standard drinks       ROS:  CONSTITUTIONAL:as per HPI  INTEGUMENTARY/SKIN: NEGATIVE for worrisome rashes, moles or lesions  EYES: NEGATIVE for vision changes or irritation  ENT/MOUTH: NEGATIVE for ear, mouth and throat problems  RESP:NEGATIVE for significant cough or SOB  CV: NEGATIVE for chest pain, palpitations or peripheral edema  GI: NEGATIVE for nausea, abdominal pain, heartburn, or change in bowel habits  : negative  MUSCULOSKELETAL: NEGATIVE for significant arthralgias or myalgia  NEURO: NEGATIVE for weakness, dizziness or paresthesias  Review of systems negative except as stated above.    OBJECTIVE  :/89 (BP Location: Right arm, Patient Position: Sitting, Cuff Size: Adult Regular)   Pulse 62   Temp 99.3  F (37.4  C)  "(Temporal)   Resp 18   Ht 1.702 m (5' 7\")   Wt 127 kg (280 lb)   SpO2 97%   BMI 43.85 kg/m    GENERAL APPEARANCE: healthy, alert and no distress  EYES: EOMI,  PERRL, conjunctiva clear  HENT: ear canals and TM's normal.  Nose with boggy turbinates and mouth without ulcers, erythema or lesions  NECK: supple, nontender, no lymphadenopathy  RESP: lungs clear to auscultation - no rales, rhonchi or wheezes  CV: regular rates and rhythm, normal S1 S2, no murmur noted  ABDOMEN:  soft, nontender, no HSM or masses and bowel sounds normal  NEURO: Normal strength and tone, sensory exam grossly normal,  normal speech and mentation  SKIN: no suspicious lesions or rashes    (Z20.828) Person under investigation for COVID-19  (primary encounter diagnosis)  Comment:   Plan: Symptomatic COVID-19 Virus (Coronavirus) by PCR         Stay home and away from others (self-isolate) until ...    You've had no fever--and no medicine that reduces fever--for 1 full day (24 hours), AND    Other symptoms have gotten better. For example, your cough or breathing has improved, AND    At least 10 days have passed since your symptoms first started.      (R50.9) Fever and chills    (J30.2) Seasonal allergic rhinitis, unspecified trigger  Comment:   Plan: cetirizine (ZYRTEC) 10 MG tablet    Provider was in full PPE with gloves x 2, gown, face mask and face shield during entire clinic visit.      Patient was masked       "

## 2020-09-20 NOTE — LETTER
[facility]  [address] [address]  [phone]      September 20, 2020    RE:  Erica Grigsbyjoseruben                                                                                                                                                       [address]  [facility] [address]        Erica Gordon has been evaluated and needs to remain out of work to isolate for 10 days from when symptoms started AND 24 hours after fever resolves (without fever reducing medications) AND improvement of respiratory symptoms (whichever is longer).      Sincerely,      Jessica Brownlee PA-C    [facility]

## 2020-09-20 NOTE — PATIENT INSTRUCTIONS
(Z20.828) Person under investigation for COVID-19  (primary encounter diagnosis)  Comment:   Plan: Symptomatic COVID-19 Virus (Coronavirus) by PCR         Stay home and away from others (self-isolate) until ...    You've had no fever--and no medicine that reduces fever--for 1 full day (24 hours), AND    Other symptoms have gotten better. For example, your cough or breathing has improved, AND    At least 10 days have passed since your symptoms first started.      (R50.9) Fever and chills    (J30.2) Seasonal allergic rhinitis, unspecified trigger  Comment:   Plan: cetirizine (ZYRTEC) 10 MG tablet              Patient Education   After Your COVID-19 (Coronavirus) Test  You have been tested for COVID-19 (coronavirus).   If you'll have surgery in the next few days, we'll let you know ahead of time if you have the virus. Please call your surgeon's office with any questions.  For all other patients: Results are usually available within 7 to 10 days. Our testing sites do not have access to your test results.     If your test result is positive, you'll get a phone call letting you know. (A positive test means that you have the virus.)    If your test result is negative, you'll get a letter in the mail. (A negative test suggests you do not have the virus.)    You may also receive your results in [employer]. If you have questions after getting your results, please visit our testing website at [email or web address].  After 7 to 10 days, if you have not gotten your results:     Call [phone] ([phone]) and ask to speak with our COVID-19 results team.    If you're being treated at an infusion center: Call your infusion center directly.  What are the symptoms of COVID-19?  Symptoms may include any of the following: Fever, cough, trouble breathing, headache, body aches, sore throat, runny or stuffy nose, fatigue (feeling very tired), diarrhea (loose poop), and nausea or vomiting (feeling sick to the  "stomach or throwing up).  You may already have symptoms of COVID-19, or they may show up later.  What should I do if I have symptoms?  If you're having surgery: Call your surgeon's office.  For all other patients: Stay home and away from others (self-isolate) until ...    You've had no fever--and no medicine that reduces fever--for 1 full day (24 hours), AND    Other symptoms have gotten better. For example, your cough or breathing has improved, AND    At least 10 days have passed since your symptoms first started.  During this time    Stay in your own room, even for meals. Use your own bathroom if you can.    Stay away from others in your home. No hugging, kissing or shaking hands. No visitors.    Don't go to work, school or anywhere else.    Clean \"high touch\" surfaces often (doorknobs, counters, handles). Use household cleaning spray or wipes. You'll find a full list of  on the EPA website: www.epa.gov/pesticide-registration/list-n-disinfectants-use-against-sars-cov-2.    Cover your mouth and nose with a mask or other face covering to avoid spreading germs.    Wash your hands and face often. Use soap and water.    Caregivers in these groups are at risk for severe illness due to COVID-19:  ? People 65 years and older  ? People who live in a nursing home or long-term care facility  ? People with chronic disease (lung, heart, cancer, diabetes, kidney, liver, immunologic)  ? People who have a weakened immune system, including those who:    Are in cancer treatment    Take medicine that weakens the immune system, such as corticosteroids    Had a bone marrow or organ transplant    Have an immune deficiency    Have poorly controlled HIV or AIDS    Are obese (body mass index of 40 or higher)    Smoke regularly    Caregivers should wear gloves while washing dishes, handling laundry and cleaning bedrooms and bathrooms.    Use caution when washing and drying laundry: Don't shake dirty laundry and use the warmest " water setting that you can.    For more tips on managing your health at home, go to www.cdc.gov/coronavirus/2019-ncov/downloads/10Things.pdf.  How can I take care of myself at home?  1. Get lots of rest. Drink extra fluids (unless a doctor has told you not to).  2. Take Tylenol (acetaminophen) for fever or pain. If you have liver or kidney problems, ask your family doctor if it's okay to take Tylenol.     Adults can take either:  ? 650 mg (two 325 mg pills) every 4 to 6 hours, or   ? 1,000 mg (two 500 mg pills) every 8 hours as needed.  ? Note: Don't take more than 3,000 mg in one day. Acetaminophen is found in many medicines (both prescribed and over-the-counter medicines). Read all labels to be sure you don't take too much.   For children, check the Tylenol bottle for the right dose. The dose is based on the child's age or weight.  3. If you have other health problems (like cancer, heart failure, an organ transplant or severe kidney disease): Call your specialty clinic if you don't feel better in the next 2 days.  4. Know when to call 911. Emergency warning signs include:  ? Trouble breathing or shortness of breath  ? Chest pain or pressure that doesn't go away  ? Feeling confused like you haven't felt before, or not being able to wake up  ? Bluish-colored lips or face  5. If your doctor prescribed a blood thinner medicine: Follow their instructions.  Where can I get more information?    [facility] - About COVID-19:   [email or web address]    CDC - If You're Sick: cdc.gov/coronavirus/2019-ncov/about/steps-when-sick.html    CDC - Ending Home Isolation: www.cdc.gov/coronavirus/2019-ncov/hcp/disposition-in-home-patients.html    CDC - Caring for Someone: www.cdc.gov/coronavirus/2019-ncov/if-you-are-sick/care-for-someone.html    [facility] - Interim Guidance for Hospital Discharge to Home: www.health.[facility].mn.us/diseases/coronavirus/hcp/hospdischarge.pdf    [facility] clinical trials (COVID-19  research studies): [email or web address]-[email or web address]    Below are the COVID-19 hotlines at the [place] Department of [facility] ([facility]). Interpreters are available.  ? For health questions: Call 906-498-0745 or [phone] (7 a.m. to 7 p.m.)  ? For questions about schools and childcare: Call 412-721-2325 or [phone] (7 a.m. to 7 p.m.)    For informational purposes only. Not to replace the advice of your health care provider. Clinically reviewed by Infection Prevention and the [facility] COVID-19 Clinical Team. Copyright   2020 [facility]. All rights reserved. [employer] 173427 - Rev 8/4/20.

## 2020-09-21 LAB
SARS-COV-2 RNA SPEC QL NAA+PROBE: NOT DETECTED
SPECIMEN SOURCE: NORMAL

## 2020-09-23 ENCOUNTER — NURSE TRIAGE (OUTPATIENT)
Dept: NURSING | Facility: CLINIC | Age: 30
End: 2020-09-23

## 2020-09-23 NOTE — TELEPHONE ENCOUNTER

## 2021-03-11 NOTE — TELEPHONE ENCOUNTER
"Requested Prescriptions   Pending Prescriptions Disp Refills     QUEtiapine (SEROQUEL) 100 MG tablet [Pharmacy Med Name: QUETIAPINE 100MG TABLETS] 90 tablet 0     Sig: TAKE 1 TABLET(100 MG) BY MOUTH AT BEDTIME       Antipsychotic Medications Failed - 6/19/2019  2:02 PM        Failed - Blood pressure under 140/90 in past 12 months     BP Readings from Last 3 Encounters:   04/04/19 (!) 152/96   03/05/19 118/80   12/03/18 132/86                 Failed - Lipid panel on file within the past 12 months     No lab results found.            Passed - Patient is 12 years of age or older        Passed - CBC on file in past 12 months     Recent Labs   Lab Test 12/03/18  1335   WBC 15.3*   RBC 3.46*   HGB 10.3*   HCT 30.6*                    Passed - Heart Rate on file within past 12 months     Pulse Readings from Last 3 Encounters:   04/04/19 93   03/05/19 89   12/03/18 107               Passed - A1c or Glucose on file in past 12 months     Recent Labs   Lab Test 12/03/18  1335   GLC 86       Please review patients last 3 weights. If a weight gain of >10 lbs exists, you may refill the prescription once after instructing the patient to schedule an appointment within the next 30 days.    Wt Readings from Last 3 Encounters:   04/04/19 129.6 kg (285 lb 11.2 oz)   12/03/18 118.8 kg (262 lb)   11/27/18 131.7 kg (290 lb 4.8 oz)             Passed - Medication is active on med list        Passed - Patient is not pregnant        Passed - No positve pregnancy test on file in past 12 months        Passed - Recent (6 mo) or future (30 days) visit within the authorizing provider's specialty     Patient had office visit in the last 6 months or has a visit in the next 30 days with authorizing provider or within the authorizing provider's specialty.  See \"Patient Info\" tab in inbasket, or \"Choose Columns\" in Meds & Orders section of the refill encounter.            Routing refill request to provider for review/approval because:  out of " REPORT OF OPERATION  DATE OF PROCEDURE: 3/11/2021    PATIENT: Alonso Constantino    SURGERY PREFORMED:   Colonoscopy     with biopsy    without use of cauterized snare    without tattooing    PREOPERATIVE DIAGNOSIS: History of Colon Polyps and Family History of Colon Cancer (mother)    POSTOPERATIVE DIAGNOSIS:    Same   Colon polyps, 80 and 30 cm   Diverticulosis was identified.   Hemorrhoids  were  identified.    SURGEON: Edilson Jimenez MD    ASSISTANTS: None    ANESTHESIA: Monitored Anesthesia Care    COMPLICATIONS: None apparent    TRANSFUSIONS: None     TISSUE TO PATHOLOGY: Polyps from 80 and 30 cm were sent to pathology for pathological diagnosis.    FINDINGS: Colon polyps, Cecum and Descending colon.  Diverticulosis was identified.  Hemorrhoids  were  identified.    INDICATIONS: This is a 68 year old male in need of a colonoscopy for History of Colon Polyps and Family History of Colon Cancer (mother).  The patient will be taken to the endoscopy suite for that procedure.    DESCRIPTIONS OF PROCEDURE IN DETAIL: After consent was obtained the patient was taken to the endoscopy suite and placed in the left lateral decubitus position.  The patient was identified and the correct patient was confirmed.  Monitored Anesthesia Care was given.  A time out was preformed verifying the correct patient and the correct procedure.  The entire operative team was in agreement.  All necessary equipment and supplies were in the room.    Rectal exam was preformed and no lesions of the anal canal were noted.  The colonoscope was inserted into the anus and passed without difficulty to the cecum.  The cecum was identified by the ileocecal valve, the coalescence of the tinea and the appendiceal orifice.  Upon withdrawal all walls of the colon were visualized.  Polyps were identified at 80 and 30 cm.  These were removed by cold biopsy forceps.  Tattooing their of the location was not done.  Large colon masses and colitis were not  range:  blood pressure           seen.colon  Diverticulosis was seen.  Upon reaching the rectum the scope was retroflexed and internal hemorrhoids  were  seen.  The scope was straightened back out and removed from the patient.  The patient was then taken to the recovery room in stable condition tolerating the procedure well.      Prep: fair    Withdrawal time was 8 minutes.    It is recommended that the patient have another colonoscopy in 5 years.

## 2021-05-28 ENCOUNTER — HOSPITAL ENCOUNTER (EMERGENCY)
Facility: CLINIC | Age: 31
Discharge: LEFT BEFORE DISCHARGE | End: 2021-05-28

## 2021-06-13 ENCOUNTER — HOSPITAL ENCOUNTER (EMERGENCY)
Facility: CLINIC | Age: 31
Discharge: HOME | End: 2021-06-13
Attending: EMERGENCY MEDICINE | Admitting: EMERGENCY MEDICINE

## 2021-06-13 VITALS
OXYGEN SATURATION: 92 % | DIASTOLIC BLOOD PRESSURE: 87 MMHG | TEMPERATURE: 98.3 F | SYSTOLIC BLOOD PRESSURE: 124 MMHG | BODY MASS INDEX: 44.57 KG/M2 | HEART RATE: 124 BPM | RESPIRATION RATE: 16 BRPM | HEIGHT: 67 IN | WEIGHT: 284 LBS

## 2021-06-13 DIAGNOSIS — T40.601A OPIATE OVERDOSE, ACCIDENTAL OR UNINTENTIONAL, INITIAL ENCOUNTER (H): ICD-10-CM

## 2021-06-13 PROCEDURE — 99283 EMERGENCY DEPT VISIT LOW MDM: CPT

## 2021-06-13 ASSESSMENT — MIFFLIN-ST. JEOR: SCORE: 2030.85

## 2021-06-13 NOTE — ED TRIAGE NOTES
Pt here from home by EMS for drug overdose. Per EMS report pt was found down by spouse. Fire arrived found pt unresponsive and gave pt 2mg intranasal narcan. Upon ems arrival pt was still unresponsive and pt sats 85%. EMs then gave pt 0.5 mg IV narcan which then the pt became responsive. Pt stated to EMS that she took a pill that she thought was percocet. EMS stated that there was concern that she aspirated vomit. Upon arrival to ED Pt aox4. VSS. Abcs intact.

## 2021-06-13 NOTE — ED NOTES
Bed: ED19  Expected date: 6/13/21  Expected time: 5:05 PM  Means of arrival: Ambulance  Comments:  [phone] sheila  ETA now

## 2021-06-13 NOTE — ED NOTES
Went to check on pt as pulse ox was off. Pt not in room. Looking at ED lobby camera found pt to be eloping. MD made aware.
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
## 2021-06-21 ENCOUNTER — OFFICE VISIT (OUTPATIENT)
Dept: URGENT CARE | Facility: URGENT CARE | Age: 31
End: 2021-06-21

## 2021-06-21 VITALS
RESPIRATION RATE: 18 BRPM | WEIGHT: 284 LBS | BODY MASS INDEX: 44.48 KG/M2 | OXYGEN SATURATION: 99 % | TEMPERATURE: 96.9 F | HEART RATE: 106 BPM | DIASTOLIC BLOOD PRESSURE: 90 MMHG | SYSTOLIC BLOOD PRESSURE: 140 MMHG

## 2021-06-21 DIAGNOSIS — H57.12 LEFT EYE PAIN: Primary | ICD-10-CM

## 2021-06-21 DIAGNOSIS — H15.102 EPISCLERITIS OF LEFT EYE: ICD-10-CM

## 2021-06-21 DIAGNOSIS — R51.9 LEFT TEMPORAL HEADACHE: ICD-10-CM

## 2021-06-21 LAB
BASOPHILS # BLD AUTO: 0.1 10E9/L (ref 0–0.2)
BASOPHILS NFR BLD AUTO: 0.7 %
DIFFERENTIAL METHOD BLD: NORMAL
EOSINOPHIL # BLD AUTO: 0.1 10E9/L (ref 0–0.7)
EOSINOPHIL NFR BLD AUTO: 1.8 %
ERYTHROCYTE [DISTWIDTH] IN BLOOD BY AUTOMATED COUNT: 14.9 % (ref 10–15)
ERYTHROCYTE [SEDIMENTATION RATE] IN BLOOD BY WESTERGREN METHOD: 16 MM/H (ref 0–20)
HCT VFR BLD AUTO: 44.3 % (ref 35–47)
HGB BLD-MCNC: 14.3 G/DL (ref 11.7–15.7)
LYMPHOCYTES # BLD AUTO: 1.5 10E9/L (ref 0.8–5.3)
LYMPHOCYTES NFR BLD AUTO: 19.9 %
MCH RBC QN AUTO: 29.5 PG (ref 26.5–33)
MCHC RBC AUTO-ENTMCNC: 32.3 G/DL (ref 31.5–36.5)
MCV RBC AUTO: 92 FL (ref 78–100)
MONOCYTES # BLD AUTO: 0.6 10E9/L (ref 0–1.3)
MONOCYTES NFR BLD AUTO: 7.5 %
NEUTROPHILS # BLD AUTO: 5.3 10E9/L (ref 1.6–8.3)
NEUTROPHILS NFR BLD AUTO: 70.1 %
PLATELET # BLD AUTO: 400 10E9/L (ref 150–450)
RBC # BLD AUTO: 4.84 10E12/L (ref 3.8–5.2)
WBC # BLD AUTO: 7.6 10E9/L (ref 4–11)

## 2021-06-21 PROCEDURE — 85652 RBC SED RATE AUTOMATED: CPT | Performed by: PHYSICIAN ASSISTANT

## 2021-06-21 PROCEDURE — 85025 COMPLETE CBC W/AUTO DIFF WBC: CPT | Performed by: PHYSICIAN ASSISTANT

## 2021-06-21 PROCEDURE — 36415 COLL VENOUS BLD VENIPUNCTURE: CPT | Performed by: PHYSICIAN ASSISTANT

## 2021-06-21 PROCEDURE — 99214 OFFICE O/P EST MOD 30 MIN: CPT | Performed by: PHYSICIAN ASSISTANT

## 2021-06-21 RX ORDER — OFLOXACIN 3 MG/ML
1-2 SOLUTION/ DROPS OPHTHALMIC 4 TIMES DAILY
Qty: 10 ML | Refills: 0 | Status: SHIPPED | OUTPATIENT
Start: 2021-06-21 | End: 2021-06-28

## 2021-06-22 NOTE — PROGRESS NOTES
"    Assessment & Plan     Left eye pain  Referral to ophthalmology for tomorrow  Start ocuflox eye drops  Will likely need steroid drops from ophthalmology  - CBC with platelets differential  - ESR: Erythrocyte sedimentation rate  - EYE ADULT REFERRAL; Future  - ofloxacin (OCUFLOX) 0.3 % ophthalmic solution; Place 1-2 drops Into the left eye 4 times daily for 7 days    Left temporal headache  CBC and ESR to evaluate for headache, normal labs  - CBC with platelets differential  - ESR: Erythrocyte sedimentation rate    Episcleritis of left eye  Start ocuflox eye drops  Referral to ophthalmology  - EYE ADULT REFERRAL; Future  - ofloxacin (OCUFLOX) 0.3 % ophthalmic solution; Place 1-2 drops Into the left eye 4 times daily for 7 days       BMI:   Estimated body mass index is 44.48 kg/m  as calculated from the following:    Height as of 6/13/21: 1.702 m (5' 7\").    Weight as of this encounter: 128.8 kg (284 lb).       CONSULTATION/REFERRAL to Ophthalmology      Sylvester Castellon PA-C  [facility]RAF Maldonado is a 30 year old who presents for the following health issues     HPI     Left eye pain  Left eye redness and irritation    Review of Systems   Constitutional, HEENT, cardiovascular, pulmonary, gi and gu systems are negative, except as otherwise noted.      Objective    BP (!) 140/90   Pulse 106   Temp 96.9  F (36.1  C) (Tympanic)   Resp 18   Wt 128.8 kg (284 lb)   SpO2 99%   BMI 44.48 kg/m    Body mass index is 44.48 kg/m .  Physical Exam   GENERAL: healthy, alert and no distress  EYES: conjunctiva/corneas- conjunctival injection OS  HENT: ear canals and TM's normal, nose and mouth without ulcers or lesions  NECK: no adenopathy, no asymmetry, masses, or scars and thyroid normal to palpation  MS: no gross musculoskeletal defects noted, no edema  SKIN: no suspicious lesions or rashes  NEURO: Normal strength and tone, mentation intact and speech normal  PSYCH: mentation appears " normal, affect normal/bright    Results for orders placed or performed in visit on 06/21/21   CBC with platelets differential     Status: None   Result Value Ref Range    WBC 7.6 4.0 - 11.0 10e9/L    RBC Count 4.84 3.8 - 5.2 10e12/L    Hemoglobin 14.3 11.7 - 15.7 g/dL    Hematocrit 44.3 35.0 - 47.0 %    MCV 92 78 - 100 fl    MCH 29.5 26.5 - 33.0 pg    MCHC 32.3 31.5 - 36.5 g/dL    RDW 14.9 10.0 - 15.0 %    Platelet Count 400 150 - 450 10e9/L    % Neutrophils 70.1 %    % Lymphocytes 19.9 %    % Monocytes 7.5 %    % Eosinophils 1.8 %    % Basophils 0.7 %    Absolute Neutrophil 5.3 1.6 - 8.3 10e9/L    Absolute Lymphocytes 1.5 0.8 - 5.3 10e9/L    Absolute Monocytes 0.6 0.0 - 1.3 10e9/L    Absolute Eosinophils 0.1 0.0 - 0.7 10e9/L    Absolute Basophils 0.1 0.0 - 0.2 10e9/L    Diff Method Automated Method    ESR: Erythrocyte sedimentation rate     Status: None   Result Value Ref Range    Sed Rate 16 0 - 20 mm/h

## 2021-10-08 ENCOUNTER — HOSPITAL ENCOUNTER (EMERGENCY)
Facility: CLINIC | Age: 31
Discharge: HOME | End: 2021-10-08
Attending: EMERGENCY MEDICINE | Admitting: EMERGENCY MEDICINE
Payer: MEDICAID

## 2021-10-08 VITALS
DIASTOLIC BLOOD PRESSURE: 97 MMHG | SYSTOLIC BLOOD PRESSURE: 168 MMHG | HEIGHT: 67 IN | HEART RATE: 86 BPM | RESPIRATION RATE: 16 BRPM | TEMPERATURE: 97.9 F | WEIGHT: 277.6 LBS | OXYGEN SATURATION: 98 % | BODY MASS INDEX: 43.57 KG/M2

## 2021-10-08 DIAGNOSIS — F11.93 OPIOID WITHDRAWAL (H): ICD-10-CM

## 2021-10-08 PROCEDURE — G2213 INITIAT MED ASSIST TX IN ER: HCPCS | Performed by: EMERGENCY MEDICINE

## 2021-10-08 PROCEDURE — 99283 EMERGENCY DEPT VISIT LOW MDM: CPT | Mod: 25 | Performed by: EMERGENCY MEDICINE

## 2021-10-08 PROCEDURE — 99283 EMERGENCY DEPT VISIT LOW MDM: CPT | Performed by: EMERGENCY MEDICINE

## 2021-10-08 PROCEDURE — 250N000013 HC RX MED GY IP 250 OP 250 PS 637: Performed by: EMERGENCY MEDICINE

## 2021-10-08 RX ORDER — BUPRENORPHINE AND NALOXONE 8; 2 MG/1; MG/1
1 FILM, SOLUBLE BUCCAL; SUBLINGUAL ONCE
Status: COMPLETED | OUTPATIENT
Start: 2021-10-08 | End: 2021-10-08

## 2021-10-08 RX ORDER — HYDROXYZINE PAMOATE 50 MG/1
50 CAPSULE ORAL 3 TIMES DAILY PRN
Qty: 30 CAPSULE | Refills: 0 | Status: SHIPPED | OUTPATIENT
Start: 2021-10-08 | End: 2023-06-12

## 2021-10-08 RX ORDER — HYDROXYZINE HYDROCHLORIDE 50 MG/1
50 TABLET, FILM COATED ORAL ONCE
Status: COMPLETED | OUTPATIENT
Start: 2021-10-08 | End: 2021-10-08

## 2021-10-08 RX ORDER — BUPRENORPHINE AND NALOXONE 8; 2 MG/1; MG/1
1 FILM, SOLUBLE BUCCAL; SUBLINGUAL DAILY
Qty: 3 FILM | Refills: 0 | Status: SHIPPED | OUTPATIENT
Start: 2021-10-09 | End: 2023-03-06

## 2021-10-08 RX ORDER — CYCLOBENZAPRINE HCL 10 MG
10 TABLET ORAL 3 TIMES DAILY PRN
Qty: 20 TABLET | Refills: 0 | Status: SHIPPED | OUTPATIENT
Start: 2021-10-08 | End: 2021-10-15

## 2021-10-08 RX ORDER — IBUPROFEN 600 MG/1
600 TABLET, FILM COATED ORAL ONCE
Status: COMPLETED | OUTPATIENT
Start: 2021-10-08 | End: 2021-10-08

## 2021-10-08 RX ADMIN — BUPRENORPHINE AND NALOXONE 1 FILM: 8; 2 FILM, SOLUBLE BUCCAL; SUBLINGUAL at 16:48

## 2021-10-08 RX ADMIN — IBUPROFEN 600 MG: 600 TABLET ORAL at 17:20

## 2021-10-08 RX ADMIN — BUPRENORPHINE AND NALOXONE 1 FILM: 8; 2 FILM BUCCAL; SUBLINGUAL at 15:58

## 2021-10-08 RX ADMIN — HYDROXYZINE HYDROCHLORIDE 50 MG: 50 TABLET, FILM COATED ORAL at 17:20

## 2021-10-08 ASSESSMENT — MIFFLIN-ST. JEOR: SCORE: 1996.82

## 2021-10-08 NOTE — ED PROVIDER NOTES
[facility] ([facility])     October 8, 2021    ED Provider Note  [facility]      History     Chief Complaint   Patient presents with     Addiction Problem     Pt is seeking detox for heorine, states last use was tuesday night.      HPI  Erica Gordon is a 31 year old female who presents for heroin withdrawal.  Patient reports daily use of heroin until 3 days ago.  She denies IV use.  She states that she has been using heroin since June.  Over the past 3 days she has developed anxiety, body aches, dry heaves, and sweating.  She is looking to detox from heroin.  She denies any other substance use.  No suicidal or homicidal ideation.  No prior history of withdrawal or detox.    Past Medical History  Past Medical History:   Diagnosis Date     Depression      Fibromyalgia      Mild intermittent asthma      Obesity (BMI 30-39.9)      Past Surgical History:   Procedure Laterality Date     NO HISTORY OF SURGERY       [START ON 10/9/2021] buprenorphine HCl-naloxone HCl (SUBOXONE) 8-2 MG per film  cyclobenzaprine (FLEXERIL) 10 MG tablet  hydrOXYzine (VISTARIL) 50 MG capsule  ibuprofen (ADVIL/MOTRIN) 200 MG tablet  levonorgestrel (MIRENA) 20 MCG/24HR IUD      Allergies   Allergen Reactions     Rocephin [Ceftriaxone Sodium] Anaphylaxis     Latex Hives and Rash     Penicillins Hives     Wellbutrin [Bupropion]      Irritability     Family History  Family History   Problem Relation Age of Onset     Myocardial Infarction Maternal Grandmother         s/p PCI; later in life     Myocardial Infarction Maternal Grandfather         s/p CABG; later in life     Diabetes No family hx of      Cerebrovascular Disease No family hx of      Coronary Artery Disease Early Onset No family hx of      Breast Cancer No family hx of      Ovarian Cancer No family hx of      Colon Cancer No family hx of      Social History   Social History     Tobacco Use     Smoking status: Former Smoker      "Packs/day: 0.50     Years: 12.00     Pack years: 6.00     Types: Cigarettes     Smokeless tobacco: Former User     Quit date: 10/26/2018     Tobacco comment: 4-5 cigs/day as of 1/8/18   Substance Use Topics     Alcohol use: No     Alcohol/week: 0.0 standard drinks     Drug use: No      Past medical history, past surgical history, medications, allergies, family history, and social history were reviewed with the patient. No additional pertinent items.       Review of Systems  A complete review of systems was performed with pertinent positives and negatives noted in the HPI, and all other systems negative.    Physical Exam   BP: (!) 146/82  Pulse: 90  Temp: 98.1  F (36.7  C)  Resp: 18  Height: 170.2 cm (5' 7\")  Weight: 125.9 kg (277 lb 9.6 oz)  SpO2: 98 %  Physical Exam  Vitals and nursing note reviewed.   Constitutional:       General: She is not in acute distress.     Appearance: Normal appearance. She is not diaphoretic.   HENT:      Head: Normocephalic and atraumatic.      Mouth/Throat:      Pharynx: No oropharyngeal exudate.   Eyes:      General: No scleral icterus.     Pupils: Pupils are equal, round, and reactive to light.   Cardiovascular:      Rate and Rhythm: Normal rate and regular rhythm.      Pulses: Normal pulses.      Heart sounds: Normal heart sounds.   Pulmonary:      Effort: No respiratory distress.      Breath sounds: Normal breath sounds.   Abdominal:      General: Bowel sounds are normal.      Palpations: Abdomen is soft.      Tenderness: There is no abdominal tenderness.   Musculoskeletal:         General: No tenderness.      Cervical back: Neck supple.   Skin:     General: Skin is warm.      Findings: No rash.   Neurological:      General: No focal deficit present.      Mental Status: She is alert.      Motor: Tremor present.   Psychiatric:         Mood and Affect: Mood is anxious.         Speech: Speech normal.         Behavior: Behavior normal.         Thought Content: Thought content normal. "         ED Course      Procedures       -----  Initiation of Medication for the Treatment of Opioid Use Disorder (OUD) in the Emergency Department (ED)  [facility]      Assessment:   Opioid(s) used: heroin   Amount: unknown  Frequency: daily  Route: smoked  Duration: 6 months  Last use: 3 days ago    Other substance(s) with ongoing use: none    The patient meets the following DSM-V criteria for Opioid Use Disorder (OUD):   Taking more than was intended  Persistent desire or unsuccessful efforts to cut down  Craving  Withdrawal    (Severity: Mild: 2-3 criteria, Moderate: 4-5 criteria. Severe: 6 or more criteria)  Based on my assessment, the patient has Moderate OUD.     Medication Initiated in the ED:  In the ED, treatment for OUD was initiated. The patient was given 2 dose(s) of 8 mg buprenorphine while in the ED.     Upon ED discharge, outpatient prescription treatment for OUD was initiated.   The patient was prescribed Suboxone and naloxone.      Referral to Ongoing Care and Supportive Services:   Upon ED discharge, the patient was referred to Addiction Medicine for ongoing care and supportive services. The patient was also provided with information on community resources for various supportive services for OUD, and advised to return to the ED if having worsening symptoms.   -----     No results found for any visits on 10/08/21.  Medications   buprenorphine HCl-naloxone HCl (SUBOXONE) 8-2 MG per film 1 Film (1 Film Sublingual Given 10/8/21 1558)   buprenorphine HCl-naloxone HCl (SUBOXONE) 8-2 MG per film 1 Film (1 Film Sublingual Given 10/8/21 1648)   hydrOXYzine (ATARAX) tablet 50 mg (50 mg Oral Given 10/8/21 1720)   ibuprofen (ADVIL/MOTRIN) tablet 600 mg (600 mg Oral Given 10/8/21 1720)        Assessments & Plan (with Medical Decision Making)   COWS 14, consistent with moderate opiate withdrawal. Patient agreeable with initiation of suboxone treatment. Given 8mg film.  Additional dose given as well as oral  hydroxyzine for anxiety.  Patient feels improved.  She will be discharged home with a 3-day supply of Suboxone and referral to the outpatient recovery clinic.  She was also provided information for [facility] assessment and rehab facilities.  Prescriptions for symptomatic relief were also provided.  Patient was discharged in stable condition.    I have reviewed the nursing notes. I have reviewed the findings, diagnosis, plan and need for follow up with the patient.    New Prescriptions    BUPRENORPHINE HCL-NALOXONE HCL (SUBOXONE) 8-2 MG PER FILM    Place 1 Film under the tongue daily    CYCLOBENZAPRINE (FLEXERIL) 10 MG TABLET    Take 1 tablet (10 mg) by mouth 3 times daily as needed for muscle spasms    HYDROXYZINE (VISTARIL) 50 MG CAPSULE    Take 1 capsule (50 mg) by mouth 3 times daily as needed for anxiety       Final diagnoses:   Opioid withdrawal (H)       --  Rosette HERRERA [facility] EMERGENCY DEPARTMENT  10/8/2021     Rosette Hinds DO  10/08/21 0025

## 2021-10-08 NOTE — ED TRIAGE NOTES
Pt states to be here for detox, states last use of heroin was on Tuesday. Pt states to not have done detox previously anywhere.

## 2021-10-08 NOTE — DISCHARGE INSTRUCTIONS
Buprenorphine Home Induction Instructions      Before taking your first dose, you will need to stop using all opioids for 12-36 hours and you will need to feel very sick with opioid withdrawal symptoms.  If you take buprenorphine too soon you will feel worse and will go into full opioid withdrawal!     For short-acting opioids like Pure Heroin, Percocet, Vicodin, Morphine it usually takes 12-24 hours before first dose.    For long-acting opioids like Oxycontin, MS Contin, Fentanyl and drugs laced with Fentanyl (Perc30) it can take 36 hours before first dose.  Methadone can take greater than 48 hours.     Before you take your first dose you should feel very sick and have at least 3 of the following symptoms:     bad chills or sweating  heavy yawning  joint/bone aches  enlarged pupils  runny nose or watery eyes  feeling restless  goose bumps  anxious or irritable  cramps, nausea, vomiting or diarrhea  twitching/tremors/shakes        Home Induction Day 1:    If you feel you are ready for your first buprenorphine dose (based on above guidelines) take 4 mg buprenorphine.  Put the tablet or film under your tongue.  Do not swallow it.  It does not work if swallowed.  Do not eat, drink or smoke anything while it is dissolving.  Wait 2 minutes after it has dissolved before you swallow or put anything in your mouth.     Wait 1 hour.      If you feel worse, do not take anymore buprenorphine for 12 hours.  You likely took your first dose too soon and it is making you have withdrawal symptoms.     If you are feeling fine, do not take any more medication today.      If you are feeling better but still having some withdrawal symptoms, take a 2nd dose of 4 mg under your tongue.    Wait 2-4 hours.      If you are feeling fine, do not take any more medication today.      If you are still having withdrawal symptoms, take a 3rd dose of 4 mg under your tongue.      Wait 2-4 hours.      If you are feeling fine, do not take any more  medication today.      If you are still having withdrawal symptoms, take a 4th dose of 4 mg under your tongue.     STOP.  Maximum dose is 16 mg of buprenorphine total.  Do not take more.  Most people feel better after 4-16 mg total on Day 1.         Home Induction Day 2:     Your dose today will be based on your response to the total amount of buprenorphine taken on Day 1.  Day 2 dose = total mg taken on Day 1.     Take today's dose in the morning.  If Day 2 dose is more than 8 mg, you can split your dose between morning and evening.    If you felt sleepy on Day 1, you should take 4 mg less today than you did on Day 1.     Later today if you feel withdrawal symptoms, you may want to take another 4 mg.    Maximum dose Day 2 is 16 mg.         Home Induction Day 3:    Today's dose will be based on your response to the total amount of buprenorphine taken on Day 2.  Day 3 dose = total mg taken on Day 2.    If Day 3 dose is more than 8 mg, you can split your dose between morning and evening.    Maximum dose Day 3 is 16 mg.         Follow up with your addiction specialist as scheduled for further dosing recommendations and medication refills.     [address], [address]  [place] [address]  Phone: [phone]  Fax:  [phone]       Hours:       Monday, Wednesday, Friday     Scheduled visits: 9:00 am - 4:00 pm     Walk-in hours:  9:00 am - 3:00 pm        Tuesday, Thursday     Walk-in only hours: 1:30 pm - 4:00 pm

## 2021-10-11 ENCOUNTER — PATIENT OUTREACH (OUTPATIENT)
Dept: INTERNAL MEDICINE | Facility: CLINIC | Age: 31
End: 2021-10-11

## 2021-10-11 NOTE — TELEPHONE ENCOUNTER
What type of discharge? Emergency Department  Risk of Hospital admission or ED visit: 57%  Is a TCM episode required? No  When should the patient follow up with PCP? within 30 days of discharge.    Jose Hui RN  [facility]

## 2021-10-11 NOTE — TELEPHONE ENCOUNTER
"  ED for acute condition Discharge Protocol    \"Hi, my name is Lula Nino RN, a registered nurse, and I am calling from [facility].  I am calling to follow up and see how things are going for you after your recent emergency visit.\"    Tell me how you are doing now that you are home?\" A lot better, the weekend was hard but I'm actually like moving around now       Discharge Instructions    \"Let's review your discharge instructions.  What is/are the follow-up recommendations?  Pt. Response: calling     \"Has an appointment with your primary care provider been scheduled?\"  No (not needed)    Medications    \"Tell me what changed about your medicines when you discharged?\"    Suboxone -     \"What questions do you have about your medications?\"   Would like to restart Prozac, so made appt with PCP for this - Can not be easily answered - schedule PCP appointment or place Epic MTM referral and can follow up at pcp appt        Call Summary    \"What questions or concerns do you have about your recent visit and your follow-up care?\"     none other than starting prozac- and scheduled visit for this.     \"If you have questions or things don't continue to improve, we encourage you contact us through the main clinic number (give number).  Even if the clinic is not open, triage nurses are available 24/7 to help you.     We would like you to know that our clinic has extended hours (provide information).  We also have urgent care (provide details on closest location and hours/contact info)\"    \"Thank you for your time and take care!\"                "

## 2021-10-16 PROBLEM — E66.01 MORBID OBESITY (H): Status: RESOLVED | Noted: 2018-04-30 | Resolved: 2021-10-16

## 2022-02-02 ENCOUNTER — OFFICE VISIT (OUTPATIENT)
Dept: URGENT CARE | Facility: URGENT CARE | Age: 32
End: 2022-02-02
Payer: COMMERCIAL

## 2022-02-02 ENCOUNTER — ANCILLARY PROCEDURE (OUTPATIENT)
Dept: GENERAL RADIOLOGY | Facility: CLINIC | Age: 32
End: 2022-02-02
Attending: PHYSICIAN ASSISTANT
Payer: COMMERCIAL

## 2022-02-02 VITALS — TEMPERATURE: 96.9 F | DIASTOLIC BLOOD PRESSURE: 93 MMHG | HEART RATE: 94 BPM | SYSTOLIC BLOOD PRESSURE: 135 MMHG

## 2022-02-02 DIAGNOSIS — M25.562 ACUTE PAIN OF LEFT KNEE: Primary | ICD-10-CM

## 2022-02-02 DIAGNOSIS — M25.562 ACUTE PAIN OF LEFT KNEE: ICD-10-CM

## 2022-02-02 LAB
ERYTHROCYTE [SEDIMENTATION RATE] IN BLOOD BY WESTERGREN METHOD: 9 MM/HR (ref 0–20)
URATE SERPL-MCNC: 4.9 MG/DL (ref 2.6–6)
WBC # BLD AUTO: 9.5 10E3/UL (ref 4–11)

## 2022-02-02 PROCEDURE — 36415 COLL VENOUS BLD VENIPUNCTURE: CPT | Performed by: PHYSICIAN ASSISTANT

## 2022-02-02 PROCEDURE — 99213 OFFICE O/P EST LOW 20 MIN: CPT | Performed by: PHYSICIAN ASSISTANT

## 2022-02-02 PROCEDURE — 73562 X-RAY EXAM OF KNEE 3: CPT | Mod: LT | Performed by: RADIOLOGY

## 2022-02-02 PROCEDURE — 85048 AUTOMATED LEUKOCYTE COUNT: CPT | Performed by: PHYSICIAN ASSISTANT

## 2022-02-02 PROCEDURE — 84550 ASSAY OF BLOOD/URIC ACID: CPT | Performed by: PHYSICIAN ASSISTANT

## 2022-02-02 PROCEDURE — 85652 RBC SED RATE AUTOMATED: CPT | Performed by: PHYSICIAN ASSISTANT

## 2022-02-02 NOTE — LETTER
[facility] [place]  [address] [address]  Phone: [phone]    February 2, 2022        Erica Gordon  [address] [facility] [address]          To whom it may concern:    RE: Erica Gordon    Patient was seen and treated today at our clinic and missed work. Please excuse absences on 2/2 and 2/3/22.    Please contact me for questions or concerns.      Sincerely,        Oleksandr Jovel PA-C

## 2022-02-02 NOTE — PATIENT INSTRUCTIONS
Patient Education     Knee Pain with Uncertain Cause    There are several common causes for knee pain. These can include:    A sprain of the ligaments that support the joint    An injury to the cartilage lining of the joint    Arthritis from wear-and-tear or inflammation  There are other causes as well. There may also be swelling, reduced movement of the knee joint, and pain with walking. A definite diagnosis will still need to be made. If your symptoms don't improve, further follow-up and testing may be needed.  Home care    Stay off the injured leg as much as possible until pain improves.    Apply an ice pack over the injured area for 15 to 20 minutes every 3 to 6 hours. You should do this for the first 24 to 48 hours. You can make an ice pack by filling a plastic bag that seals at the top with ice cubes and then wrapping it with a thin towel. Continue to use ice packs for relief of pain and swelling as needed. As the ice melts, be careful not to get your wrap, splint, or cast wet. After 48 hours, apply heat (warm shower or warm bath) for 15 to 20 minutes several times a day, or alternate ice and heat. If you have to wear a hook-and-loop knee brace, you can open it to apply the ice pack, or heat, directly to the knee. Never put ice directly on the skin. Always wrap the ice in a towel or other type of cloth.    You may use over-the-counter pain medicine to control pain, unless another pain medicine was prescribed. If you have chronic liver or kidney disease or ever had a stomach ulcer or gastrointestinal bleeding, talk with your healthcare provider before using these medicines.    If crutches or a walker have been recommended, don't put weight on the injured leg until you can do so without pain. Check with your healthcare provider before returning to sports or full work duties.    If you have a hook-and-loop knee brace, you can remove it to bathe and sleep, unless told otherwise.  Follow-up care  Follow up with  your healthcare provider as advised. This is usually within 1 to 2 weeks.  If X-rays were taken, you will be told of any new findings that may affect your care  Call 911  Call 911 if you have:    Shortness of breath    Chest pain  When to seek medical advice  Call your healthcare provider right away if any of these occur:    Toes or foot becomes swollen, cold, blue, numb, or tingly    Pain or swelling spreads over the knee or calf    Warmth or redness appears over the knee or calf    Other joints become painful    Rash appears    Fever of 100.4 F (38 C) or higher, or as directed by your healthcare provider    Edil Wesley last reviewed this educational content on 5/1/2018 2000-2021 [employer]. All rights reserved. This information is not intended as a substitute for professional medical care. Always follow your healthcare professional's instructions.

## 2022-02-04 NOTE — PROGRESS NOTES
SUBJECTIVE:  Chief Complaint   Patient presents with     Urgent Care     left knee pain since last night. No known injury.      Erica Gordon is a 31 year old female presents with a chief complaint of left knee pain.  The injury occurred 1 day(s) ago.   The injury happened while while walking. How: twistedimmediate pain.  The patient complained of moderate pain  and has not had decreased ROM.  Pain exacerbated by weight-bearing.  Relieved by ice and crutches.  She treated it initially with ice and crutches. This is not the first time this type of injury has occurred to this patient.     Past Medical History:   Diagnosis Date     Depression      Fibromyalgia      Mild intermittent asthma      Obesity (BMI 30-39.9)      Current Outpatient Medications   Medication Sig Dispense Refill     levonorgestrel (MIRENA) 20 MCG/24HR IUD 1 each (20 mcg) by Intrauterine route continuous       buprenorphine HCl-naloxone HCl (SUBOXONE) 8-2 MG per film Place 1 Film under the tongue daily (Patient not taking: Reported on 2/2/2022) 3 Film 0     hydrOXYzine (VISTARIL) 50 MG capsule Take 1 capsule (50 mg) by mouth 3 times daily as needed for anxiety (Patient not taking: Reported on 2/2/2022) 30 capsule 0     Social History     Tobacco Use     Smoking status: Former Smoker     Packs/day: 0.50     Years: 12.00     Pack years: 6.00     Types: Cigarettes     Quit date: 10/26/2018     Years since quitting: 3.2     Smokeless tobacco: Never Used   Substance Use Topics     Alcohol use: No     Alcohol/week: 0.0 standard drinks       ROS:  Review of systems negative except as stated above.    EXAM:   BP (!) 135/93   Pulse 94   Temp 96.9  F (36.1  C) (Tympanic)   Gen: healthy,alert,no distress  Extremity: knee has point tenderness at PF tendon.   There is not compromise to the distal circulation.  Pulses are +2 and CRT is brisk  GENERAL APPEARANCE: healthy, alert and no distress  CHEST: clear to auscultation  CV: regular rate and rhythm  MS:  no gross deformities noted, no evidence of inflammation in joints, FROM in all extremities.  SKIN: no suspicious lesions or rashes  NEURO: Normal strength and tone, sensory exam grossly normal, mentation intact and speech normal    X-ray image initially interpreted in clinic by me in order to rule out fracture/dislocation.  No evidence appreciated.    Results for orders placed or performed in visit on 02/02/22   XR Knee Left 3 Views     Status: None    Narrative    LEFT KNEE THREE VIEWS     2/2/2022 2:05 PM     HISTORY: Acute pain of left knee.    COMPARISON: None.      Impression    IMPRESSION: Normal joint spacing. No acute fracture or joint effusion.  Mild early medial and lateral compartment marginal osteophyte  formation.     BRUCE GUADARRAMA MD         SYSTEM ID:  [id number]   Results for orders placed or performed in visit on 02/02/22   WBC count     Status: Normal   Result Value Ref Range    WBC Count 9.5 4.0 - 11.0 10e3/uL   Uric acid     Status: Normal   Result Value Ref Range    Uric Acid 4.9 2.6 - 6.0 mg/dL   ESR: Erythrocyte sedimentation rate     Status: Normal   Result Value Ref Range    Erythrocyte Sedimentation Rate 9 0 - 20 mm/hr       ASSESSMENT:   (M25.562) Acute pain of left knee  (primary encounter diagnosis)  Plan: XR Knee Left 3 Views, WBC count, Uric acid,         ESR: Erythrocyte sedimentation rate, Orthopedic         Referral      Patient Instructions     Patient Education     Knee Pain with Uncertain Cause    There are several common causes for knee pain. These can include:    A sprain of the ligaments that support the joint    An injury to the cartilage lining of the joint    Arthritis from wear-and-tear or inflammation  There are other causes as well. There may also be swelling, reduced movement of the knee joint, and pain with walking. A definite diagnosis will still need to be made. If your symptoms don't improve, further follow-up and testing may be needed.  Home care    Stay  off the injured leg as much as possible until pain improves.    Apply an ice pack over the injured area for 15 to 20 minutes every 3 to 6 hours. You should do this for the first 24 to 48 hours. You can make an ice pack by filling a plastic bag that seals at the top with ice cubes and then wrapping it with a thin towel. Continue to use ice packs for relief of pain and swelling as needed. As the ice melts, be careful not to get your wrap, splint, or cast wet. After 48 hours, apply heat (warm shower or warm bath) for 15 to 20 minutes several times a day, or alternate ice and heat. If you have to wear a hook-and-loop knee brace, you can open it to apply the ice pack, or heat, directly to the knee. Never put ice directly on the skin. Always wrap the ice in a towel or other type of cloth.    You may use over-the-counter pain medicine to control pain, unless another pain medicine was prescribed. If you have chronic liver or kidney disease or ever had a stomach ulcer or gastrointestinal bleeding, talk with your healthcare provider before using these medicines.    If crutches or a walker have been recommended, don't put weight on the injured leg until you can do so without pain. Check with your healthcare provider before returning to sports or full work duties.    If you have a hook-and-loop knee brace, you can remove it to bathe and sleep, unless told otherwise.  Follow-up care  Follow up with your healthcare provider as advised. This is usually within 1 to 2 weeks.  If X-rays were taken, you will be told of any new findings that may affect your care  Call 911  Call 911 if you have:    Shortness of breath    Chest pain  When to seek medical advice  Call your healthcare provider right away if any of these occur:    Toes or foot becomes swollen, cold, blue, numb, or tingly    Pain or swelling spreads over the knee or calf    Warmth or redness appears over the knee or calf    Other joints become painful    Rash  appears    Fever of 100.4 F (38 C) or higher, or as directed by your healthcare provider    Edil Wesley last reviewed this educational content on 5/1/2018 2000-2021 [employer]. All rights reserved. This information is not intended as a substitute for professional medical care. Always follow your healthcare professional's instructions.

## 2022-02-07 NOTE — PROGRESS NOTES
CHIEF COMPLAINT:  No chief complaint on file.       HISTORY OF PRESENT ILLNESS  Ms. Gordon is a pleasant 31 year old year old female with past my history of fibromyalgia, depression, opiate abuse who presents to clinic today with left knee pain.  Erica explains that last Tuesday she was putting her daughter to bed after working and standing on her feet all day, and randomly had knee pain with no known event or injury that she can recall     Onset: sudden 2/1/22  Location: left knee lateral and superior patella   Quality:  sharp and pinching/ shooting  Duration: 1 weeks   Severity: 10/10 at worst  Timing:constant  Modifying factors:  resting and non-use makes it better, movement and use makes it worse  Associated signs & symptoms: pain  Previous similar pain: No  Treatments to date:tylenol, knee brace    Additional history: Denies catching locking or instability.    Review of Systems:    Have you recently had a a fever, chills, weight loss? No    Do you have any vision problems? No    Do you have any chest pain or edema? No    Do you have any shortness of breath or wheezing?  No    Do you have stomach problems? No    Do you have any numbness or focal weakness? No    Do you have diabetes? No    Do you have problems with bleeding or clotting? No    Do you have an rashes or other skin lesions? No    MEDICAL HISTORY  Patient Active Problem List   Diagnosis     Fibromyalgia     Depression     Mild persistent asthma     Obesity (BMI 30-39.9)       Current Outpatient Medications   Medication Sig Dispense Refill     buprenorphine HCl-naloxone HCl (SUBOXONE) 8-2 MG per film Place 1 Film under the tongue daily (Patient not taking: Reported on 2/2/2022) 3 Film 0     hydrOXYzine (VISTARIL) 50 MG capsule Take 1 capsule (50 mg) by mouth 3 times daily as needed for anxiety (Patient not taking: Reported on 2/2/2022) 30 capsule 0     levonorgestrel (MIRENA) 20 MCG/24HR IUD 1 each (20 mcg) by Intrauterine route continuous          Allergies   Allergen Reactions     Rocephin [Ceftriaxone Sodium] Anaphylaxis     Latex Hives     Penicillins Hives     Wellbutrin [Bupropion]      Irritability       Family History   Problem Relation Age of Onset     Myocardial Infarction Maternal Grandmother         s/p PCI; later in life     Myocardial Infarction Maternal Grandfather         s/p CABG; later in life     Diabetes No family hx of      Cerebrovascular Disease No family hx of      Coronary Artery Disease Early Onset No family hx of      Breast Cancer No family hx of      Ovarian Cancer No family hx of      Colon Cancer No family hx of        Additional medical/Social/Surgical histories reviewed in [facility] and updated as appropriate.       PHYSICAL EXAM  There were no vitals taken for this visit.    General  - normal appearance, in no obvious distress  Musculoskeletal - Left knee  - stance: Mildly antalgic gait favoring left lower extremity  - inspection: no swelling or effusion, normal bone and joint alignment, no obvious deformity  - palpation: Lateral joint line tenderness, patella and patellar tendon non-tender.  Tenderness at lateral patellar facet  - ROM: 125 degrees flexion, -5 degrees extension, not painful, normal actively and passively compared to contralateral   - strength: 5/5 in flexion, 5/5 in extension  - special tests:  (-) Lachman  (-) anterior drawer  (-) Get  (-) Patellar apprehension  (-) varus at 0 and 30 degrees flexion  (-) valgus at 0 and 30 degrees flexion  Neuro  - no sensory or motor deficit, grossly normal coordination, normal muscle tone  Skin  - no ecchymosis, erythema, warmth, or induration, no obvious rash  Psych  - interactive, appropriate, normal mood and affect    IMAGING :  LEFT KNEE THREE VIEWS     2/2/2022 2:05 PM      HISTORY: Acute pain of left knee.     COMPARISON: None.                                                                      IMPRESSION: Normal joint spacing. No acute fracture or joint  effusion.  Mild early medial and lateral compartment marginal osteophyte  formation.      BRUCE GUADARRAMA MD     Independent interpretation of left knee x-rays from 2/2 revealing no acute osseous abnormalities, no joint space narrowing.  No perceived effusion on lateral views.     ASSESSMENT & PLAN  Ms. Gordon is a 31 year old year old female who presents to clinic today with acute left knee pain which began fairly abruptly on 2/1/2022 without known inciting event aside from standing.    Diagnosis: Acute pain of left knee    At this time her pain localizes to the lateral joint and we discussed possibility of meniscal pathology versus more generalized patellofemoral pain.  No mechanical symptoms at this time and we agreed that initial conservative treatment would be warranted first.    I provided a prescription for Medrol Dosepak to reduce inflammation more abruptly.  She can use Tylenol or ibuprofen thereafter as needed only.  Use of ice to the anterolateral knee.  Pull on knee brace with patellar cutout was provided as well for stability and proprioception.  I have also recommended she follow-up with formal physical therapy over the next 4 weeks.  Would like to see her back in roughly 4 weeks time to review her progress and if there is ongoing difficulties, I would then recommend MRI of her left knee.  If she develops any mechanical symptoms over the next month, I would also then recommend she return to discuss possible MRI.    It was a pleasure seeing Erica today.    Farshad Victor DO, [facility]  Primary Care Sports Medicine

## 2022-02-09 ENCOUNTER — OFFICE VISIT (OUTPATIENT)
Dept: ORTHOPEDICS | Facility: CLINIC | Age: 32
End: 2022-02-09
Attending: PHYSICIAN ASSISTANT
Payer: COMMERCIAL

## 2022-02-09 VITALS — SYSTOLIC BLOOD PRESSURE: 148 MMHG | WEIGHT: 277 LBS | DIASTOLIC BLOOD PRESSURE: 92 MMHG | BODY MASS INDEX: 43.38 KG/M2

## 2022-02-09 DIAGNOSIS — M25.562 ACUTE PAIN OF LEFT KNEE: Primary | ICD-10-CM

## 2022-02-09 PROCEDURE — 99204 OFFICE O/P NEW MOD 45 MIN: CPT | Performed by: FAMILY MEDICINE

## 2022-02-09 RX ORDER — METHYLPREDNISOLONE 4 MG
TABLET, DOSE PACK ORAL
Qty: 21 TABLET | Refills: 0 | Status: SHIPPED | OUTPATIENT
Start: 2022-02-09 | End: 2023-06-12

## 2022-02-09 NOTE — LETTER
2/9/2022         RE: Erica Gordon  [address] [facility] [address]        Dear Colleague,    Thank you for referring your patient, Erica Gordon, to the [facility] [place]. Please see a copy of my visit note below.    CHIEF COMPLAINT:  No chief complaint on file.       HISTORY OF PRESENT ILLNESS  Ms. Gordon is a pleasant 31 year old year old female with past my history of fibromyalgia, depression, opiate abuse who presents to clinic today with left knee pain.  Erica explains that last Tuesday she was putting her daughter to bed after working and standing on her feet all day, and randomly had knee pain with no known event or injury that she can recall     Onset: sudden 2/1/22  Location: left knee lateral and superior patella   Quality:  sharp and pinching/ shooting  Duration: 1 weeks   Severity: 10/10 at worst  Timing:constant  Modifying factors:  resting and non-use makes it better, movement and use makes it worse  Associated signs & symptoms: pain  Previous similar pain: No  Treatments to date:tylenol, knee brace    Additional history: Denies catching locking or instability.    Review of Systems:    Have you recently had a a fever, chills, weight loss? No    Do you have any vision problems? No    Do you have any chest pain or edema? No    Do you have any shortness of breath or wheezing?  No    Do you have stomach problems? No    Do you have any numbness or focal weakness? No    Do you have diabetes? No    Do you have problems with bleeding or clotting? No    Do you have an rashes or other skin lesions? No    MEDICAL HISTORY  Patient Active Problem List   Diagnosis     Fibromyalgia     Depression     Mild persistent asthma     Obesity (BMI 30-39.9)       Current Outpatient Medications   Medication Sig Dispense Refill     buprenorphine HCl-naloxone HCl (SUBOXONE) 8-2 MG per film Place 1 Film under the tongue daily (Patient not taking: Reported on 2/2/2022) 3 Film 0      hydrOXYzine (VISTARIL) 50 MG capsule Take 1 capsule (50 mg) by mouth 3 times daily as needed for anxiety (Patient not taking: Reported on 2/2/2022) 30 capsule 0     levonorgestrel (MIRENA) 20 MCG/24HR IUD 1 each (20 mcg) by Intrauterine route continuous         Allergies   Allergen Reactions     Rocephin [Ceftriaxone Sodium] Anaphylaxis     Latex Hives     Penicillins Hives     Wellbutrin [Bupropion]      Irritability       Family History   Problem Relation Age of Onset     Myocardial Infarction Maternal Grandmother         s/p PCI; later in life     Myocardial Infarction Maternal Grandfather         s/p CABG; later in life     Diabetes No family hx of      Cerebrovascular Disease No family hx of      Coronary Artery Disease Early Onset No family hx of      Breast Cancer No family hx of      Ovarian Cancer No family hx of      Colon Cancer No family hx of        Additional medical/Social/Surgical histories reviewed in [facility] and updated as appropriate.       PHYSICAL EXAM  There were no vitals taken for this visit.    General  - normal appearance, in no obvious distress  Musculoskeletal - Left knee  - stance: Mildly antalgic gait favoring left lower extremity  - inspection: no swelling or effusion, normal bone and joint alignment, no obvious deformity  - palpation: Lateral joint line tenderness, patella and patellar tendon non-tender.  Tenderness at lateral patellar facet  - ROM: 125 degrees flexion, -5 degrees extension, not painful, normal actively and passively compared to contralateral   - strength: 5/5 in flexion, 5/5 in extension  - special tests:  (-) Lachman  (-) anterior drawer  (-) Get  (-) Patellar apprehension  (-) varus at 0 and 30 degrees flexion  (-) valgus at 0 and 30 degrees flexion  Neuro  - no sensory or motor deficit, grossly normal coordination, normal muscle tone  Skin  - no ecchymosis, erythema, warmth, or induration, no obvious rash  Psych  - interactive, appropriate, normal mood and  affect    IMAGING :  LEFT KNEE THREE VIEWS     2/2/2022 2:05 PM      HISTORY: Acute pain of left knee.     COMPARISON: None.                                                                      IMPRESSION: Normal joint spacing. No acute fracture or joint effusion.  Mild early medial and lateral compartment marginal osteophyte  formation.      BRUCE GUADARRAMA MD     Independent interpretation of left knee x-rays from 2/2 revealing no acute osseous abnormalities, no joint space narrowing.  No perceived effusion on lateral views.     ASSESSMENT & PLAN  Ms. Gordon is a 31 year old year old female who presents to clinic today with acute left knee pain which began fairly abruptly on 2/1/2022 without known inciting event aside from standing.    Diagnosis: Acute pain of left knee    At this time her pain localizes to the lateral joint and we discussed possibility of meniscal pathology versus more generalized patellofemoral pain.  No mechanical symptoms at this time and we agreed that initial conservative treatment would be warranted first.    I provided a prescription for Medrol Dosepak to reduce inflammation more abruptly.  She can use Tylenol or ibuprofen thereafter as needed only.  Use of ice to the anterolateral knee.  Pull on knee brace with patellar cutout was provided as well for stability and proprioception.  I have also recommended she follow-up with formal physical therapy over the next 4 weeks.  Would like to see her back in roughly 4 weeks time to review her progress and if there is ongoing difficulties, I would then recommend MRI of her left knee.  If she develops any mechanical symptoms over the next month, I would also then recommend she return to discuss possible MRI.    It was a pleasure seeing Erica today.    Farshad Victor DO, [facility]  Primary Care Sports Medicine        Again, thank you for allowing me to participate in the care of your patient.        Sincerely,        Farshad Victor DO

## 2022-02-09 NOTE — PATIENT INSTRUCTIONS
Thank you for choosing [facility]    DR PRICE'S CLINIC LOCATIONS  [place] [address] [facility], [address]th [place]   [place], [place], [address] [place] [address]   [phone]       APPOINTMENTS: [phone]    CARE QUESTIONS: [phone], #3    BILLING QUESTIONS: [phone]    FAX NUMBER: [phone]        Follow up: 4-6 weeks.    Physical Therapy orders have been placed with [facility] (formally [facility])  You can call [phone] to schedule at your convenience.

## 2022-02-16 ENCOUNTER — PATIENT OUTREACH (OUTPATIENT)
Dept: INTERNAL MEDICINE | Facility: CLINIC | Age: 32
End: 2022-02-16
Payer: COMMERCIAL

## 2022-02-16 ENCOUNTER — HOSPITAL ENCOUNTER (EMERGENCY)
Facility: CLINIC | Age: 32
Discharge: LEFT BEFORE DISCHARGE | End: 2022-02-16
Attending: EMERGENCY MEDICINE | Admitting: EMERGENCY MEDICINE
Payer: COMMERCIAL

## 2022-02-16 VITALS
TEMPERATURE: 97.1 F | HEIGHT: 66 IN | WEIGHT: 293 LBS | RESPIRATION RATE: 18 BRPM | BODY MASS INDEX: 47.09 KG/M2 | OXYGEN SATURATION: 94 % | SYSTOLIC BLOOD PRESSURE: 133 MMHG | DIASTOLIC BLOOD PRESSURE: 96 MMHG | HEART RATE: 105 BPM

## 2022-02-16 DIAGNOSIS — T40.2X1A OPIOID OVERDOSE, ACCIDENTAL OR UNINTENTIONAL, INITIAL ENCOUNTER (H): ICD-10-CM

## 2022-02-16 LAB
ATRIAL RATE - MUSE: 113 BPM
DIASTOLIC BLOOD PRESSURE - MUSE: NORMAL MMHG
INTERPRETATION ECG - MUSE: NORMAL
P AXIS - MUSE: 64 DEGREES
PR INTERVAL - MUSE: 148 MS
QRS DURATION - MUSE: 100 MS
QT - MUSE: 342 MS
QTC - MUSE: 469 MS
R AXIS - MUSE: 6 DEGREES
SYSTOLIC BLOOD PRESSURE - MUSE: NORMAL MMHG
T AXIS - MUSE: 29 DEGREES
VENTRICULAR RATE- MUSE: 113 BPM

## 2022-02-16 PROCEDURE — 99283 EMERGENCY DEPT VISIT LOW MDM: CPT

## 2022-02-16 PROCEDURE — 93005 ELECTROCARDIOGRAM TRACING: CPT

## 2022-02-16 ASSESSMENT — ENCOUNTER SYMPTOMS
SHORTNESS OF BREATH: 0
APNEA: 1
VOMITING: 0
HEADACHES: 0
CHILLS: 0
NAUSEA: 0
FEVER: 0

## 2022-02-16 NOTE — ED PROVIDER NOTES
History   Chief Complaint:  Drug Overdose       HPI   Erica Gordon is a 31 year old female with a history of opiate use who presents from home via EMS with a fentanyl overdose. Per the patient and EMS personnel, the patient's  found her unresponsive on the floor of their living room this morning. Upon EMS arrival, she had a pulse but was essentially apneic, with 1-2 agonal breaths every minute. EMS gave her high flow oxygen and 2 mg Narcan intranasally, and the patient began to be more responsive. After sternal rub, she became fully responsive and has remained alert and oriented. She admits to using fentanyl and has a history of opiate use. Her oxygen saturations were in the low 90s on room air upon arrival. While at the ER, she reports she remembers the event. She denies suicidal ideation, homicidal ideation, fever, chills, shortness of breath, nausea, vomiting, headaches, and any pain. She has not seen a drug counselor or been in rehab, but is open to this. She states she last used fentanyl prior to today 6-7 months ago. She reports tobacco use. She denies alcohol and other drug use.    Review of Systems   Constitutional: Negative for chills and fever.   Respiratory: Positive for apnea (resolved). Negative for shortness of breath.    Gastrointestinal: Negative for nausea and vomiting.   Neurological: Negative for headaches.   Psychiatric/Behavioral: Positive for behavioral problems (fentanyl use). Negative for suicidal ideas.        Negative for homicidal ideation   All other systems reviewed and are negative.        Allergies:  Rocephin  Latex  Penicillins  Wellbutrin    Medications:  Suboxone  Vistaril  Mirena IUD  Methylprednisolone     Past Medical History:     Depression  Fibromyalgia  Asthma  Opiate use    Social History:  The patient was accompanied to the ER by EMS personnel  Drug Use: Positive for fentanyl use, negative for other drug use  Alcohol Use: Negative  Tobacco Use: Reports smoking  "cigarettes   Marital Status:     Physical Exam     Patient Vitals for the past 24 hrs:   BP Temp Temp src Pulse Resp SpO2 Height Weight   22 0805 -- 97.1  F (36.2  C) Tympanic -- -- -- -- --   22 0803 -- -- -- 105 18 94 % -- --   22 0753 (!) 133/96 -- -- 115 18 94 % 1.676 m (5' 6\") 134.3 kg (296 lb)       Physical Exam  Constitutional: Well developed, nontox appearance  Head: Atraumatic.   Mouth/Throat: Oropharynx is clear  Neck:  no stridor  Eyes: no scleral icterus  Cardiovascular: Regular tachycardia, 2+ L radial pulse  Pulmonary/Chest: nml resp effort  Abdominal: ND, soft, NT, no rebound or guarding   Ext: Warm, well perfused, no edema  Neurological: A&O, symmetric facies, moves ext x4  Skin: Skin is warm and dry.   Psychiatric: Behavior is normal. Thought content normal.   Nursing note and vitals reviewed.    Emergency Department Course   ECG  ECG obtained at 0755, ECG read at 0755  Sinus tachycardia  Otherwise normal ECG   No significant change as compared to prior, dated 2018.  Rate 113 bpm. CA interval 148 ms. QRS duration 100 ms. QT/QTc 342/469 ms. P-R-T axes 64 6 29.     Emergency Department Course:    Reviewed:  I reviewed nursing notes, vitals and past medical history    Assessments:  0749 Patient arrived at the ER. I obtained history from EMS personnel.  0755 I obtained history from the patient and examined the patient as noted above.     Disposition:  The patient left AMA.     Impression & Plan     CMS Diagnoses: None    Medical Decision Makin year old female presenting w/ opioid overdose     Patient presents with admitted opioid overdose having received Narcan in the field is not alert, oriented and appropriate.  No evidence of depressed respiratory status at this time.  I recommended observation in the emergency department for rebound opiate overdose symptoms after metabolization of Narcan.  Patient requested discharge and refused to stay in the emergency " department.  I discussed the risks of rebound opioid overdose including respiratory suppression, permanent disability and death.  The patient is understanding and was able to repeat the risks back to me.  She continued to request discharge and the patient was subsequently signed out AGAINST MEDICAL ADVICE.    Diagnosis:    ICD-10-CM    1. Opioid overdose, accidental or unintentional, initial encounter (H)  T40.2X1A        Discharge Medications:  Discharge Medication List as of 2/16/2022  8:03 AM      START taking these medications    Details   naloxone (NARCAN) 4 MG/0.1ML nasal spray Spray 1 spray (4 mg) into one nostril alternating nostrils once as needed for opioid reversal every 2-3 minutes until assistance arrives, Disp-0.2 mL, R-0, Local Print             Scribe Disclosure:  I, Santo Rodriges, am serving as a scribe at 7:50 AM on 2/16/2022 to document services personally performed by Dany Mcleod MD based on my observations and the provider's statements to me.          Dany Mcleod MD  02/16/22 1104

## 2022-02-16 NOTE — TELEPHONE ENCOUNTER
What type of discharge? Emergency Department  Risk of Hospital admission or ED visit: 75%  Is a TCM episode required? No  When should the patient follow up with PCP? within 30 days of discharge.    Willian Campbell RN  [facility]

## 2022-02-16 NOTE — ED TRIAGE NOTES
Pt was found unresponsive with agonal breathing by  this morning, history of opioid abuse, EMS arrived and provided baging and gave intranasal narcan, pt woke up. Pt has been vitally stable, upon arrival to ED pt is A & O X 4 and denies any attempt to hurt self but reports taking fentanyl. PD searched pt and confiscated a small baggy of fentanyl. Pt has bra on but it was searched with 2 staff members- lighter removed from bra

## 2022-02-16 NOTE — TELEPHONE ENCOUNTER
ED / Discharge Outreach Protocol    Patient Contact    Attempt # 1    Was call answered?  No.  Left message on voicemail with information to call me back.

## 2022-02-20 ENCOUNTER — HEALTH MAINTENANCE LETTER (OUTPATIENT)
Age: 32
End: 2022-02-20

## 2022-10-23 ENCOUNTER — HEALTH MAINTENANCE LETTER (OUTPATIENT)
Age: 32
End: 2022-10-23

## 2022-10-29 ENCOUNTER — ANCILLARY PROCEDURE (OUTPATIENT)
Dept: GENERAL RADIOLOGY | Facility: CLINIC | Age: 32
End: 2022-10-29
Attending: PHYSICIAN ASSISTANT
Payer: COMMERCIAL

## 2022-10-29 ENCOUNTER — OFFICE VISIT (OUTPATIENT)
Dept: URGENT CARE | Facility: URGENT CARE | Age: 32
End: 2022-10-29
Payer: COMMERCIAL

## 2022-10-29 VITALS
OXYGEN SATURATION: 98 % | BODY MASS INDEX: 47.61 KG/M2 | WEIGHT: 293 LBS | TEMPERATURE: 98.3 F | RESPIRATION RATE: 20 BRPM | HEART RATE: 85 BPM | DIASTOLIC BLOOD PRESSURE: 104 MMHG | SYSTOLIC BLOOD PRESSURE: 137 MMHG

## 2022-10-29 DIAGNOSIS — R52 BODY ACHES: ICD-10-CM

## 2022-10-29 DIAGNOSIS — J20.8 ACUTE BACTERIAL BRONCHITIS: ICD-10-CM

## 2022-10-29 DIAGNOSIS — R05.8 OTHER COUGH: ICD-10-CM

## 2022-10-29 DIAGNOSIS — B96.89 ACUTE BACTERIAL BRONCHITIS: ICD-10-CM

## 2022-10-29 DIAGNOSIS — J10.1 INFLUENZA A: ICD-10-CM

## 2022-10-29 DIAGNOSIS — R07.0 THROAT PAIN: Primary | ICD-10-CM

## 2022-10-29 PROBLEM — E66.01 MORBID OBESITY (H): Status: ACTIVE | Noted: 2022-10-29

## 2022-10-29 LAB
DEPRECATED S PYO AG THROAT QL EIA: NEGATIVE
FLUAV AG SPEC QL IA: POSITIVE
FLUBV AG SPEC QL IA: NEGATIVE
GROUP A STREP BY PCR: NOT DETECTED
SARS-COV-2 RNA RESP QL NAA+PROBE: NEGATIVE

## 2022-10-29 PROCEDURE — 71046 X-RAY EXAM CHEST 2 VIEWS: CPT | Mod: TC | Performed by: RADIOLOGY

## 2022-10-29 PROCEDURE — U0005 INFEC AGEN DETEC AMPLI PROBE: HCPCS | Performed by: PHYSICIAN ASSISTANT

## 2022-10-29 PROCEDURE — 87651 STREP A DNA AMP PROBE: CPT | Performed by: PHYSICIAN ASSISTANT

## 2022-10-29 PROCEDURE — 87804 INFLUENZA ASSAY W/OPTIC: CPT | Mod: 59 | Performed by: PHYSICIAN ASSISTANT

## 2022-10-29 PROCEDURE — U0003 INFECTIOUS AGENT DETECTION BY NUCLEIC ACID (DNA OR RNA); SEVERE ACUTE RESPIRATORY SYNDROME CORONAVIRUS 2 (SARS-COV-2) (CORONAVIRUS DISEASE [COVID-19]), AMPLIFIED PROBE TECHNIQUE, MAKING USE OF HIGH THROUGHPUT TECHNOLOGIES AS DESCRIBED BY CMS-2020-01-R: HCPCS | Performed by: PHYSICIAN ASSISTANT

## 2022-10-29 PROCEDURE — 99214 OFFICE O/P EST MOD 30 MIN: CPT | Mod: CS | Performed by: PHYSICIAN ASSISTANT

## 2022-10-29 RX ORDER — AZITHROMYCIN 250 MG/1
TABLET, FILM COATED ORAL
Qty: 6 TABLET | Refills: 0 | Status: SHIPPED | OUTPATIENT
Start: 2022-10-29 | End: 2022-11-03

## 2022-10-29 RX ORDER — ALBUTEROL SULFATE 90 UG/1
2 AEROSOL, METERED RESPIRATORY (INHALATION) EVERY 6 HOURS
Qty: 18 G | Refills: 0 | Status: SHIPPED | OUTPATIENT
Start: 2022-10-29 | End: 2023-03-06

## 2022-10-29 RX ORDER — BENZONATATE 200 MG/1
200 CAPSULE ORAL 3 TIMES DAILY PRN
Qty: 21 CAPSULE | Refills: 0 | Status: SHIPPED | OUTPATIENT
Start: 2022-10-29 | End: 2022-11-05

## 2022-10-29 NOTE — PROGRESS NOTES
"  Assessment & Plan     Throat pain    Strep neg, culture pending  - Streptococcus A Rapid Screen w/Reflex to PCR - Clinic Collect  - Group A Streptococcus PCR Throat Swab    Body aches    Secondary to infction  - Influenza A & B Antigen - Clinic Collect    Acute bacterial bronchitis    Bronchitis is an infection of the air passages (bronchial tubes) in your lungs. It often occurs when you have a cold. This illness is contagious during the first few days and is spread through the air by coughing and sneezing, or by direct contact (touching the sick person and then touching your own eyes, nose, or mouth).  Symptoms of bronchitis include cough with mucus (phlegm) and low-grade fever. Bronchitis usually lasts 7 to 14 days. Mild cases can be treated with simple home remedies. More severe infection is treated with an antibiotic.    - azithromycin (ZITHROMAX) 250 MG tablet; Take 2 tablets (500 mg) by mouth daily for 1 day, THEN 1 tablet (250 mg) daily for 4 days.  - benzonatate (TESSALON) 200 MG capsule; Take 1 capsule (200 mg) by mouth 3 times daily as needed  - albuterol (PROVENTIL HFA) 108 (90 Base) MCG/ACT inhaler; Inhale 2 puffs into the lungs every 6 hours    Influenza A    Patient given information about influenza.  Patient understands they are contagious until their fever has resolved without the use of motrin or tylenol.  At that time they can return to school/work.  Patient is to monitor for any worsening symptoms and return to the clinic if this occurs.  The most common complication of influenza is Pneumonia or other respiratory problems especially in those with underlying lung problems including asthma and COPD.  Patient will follow up if this occurs.        Review of external notes as documented elsewhere in note       BMI:   Estimated body mass index is 47.61 kg/m  as calculated from the following:    Height as of 2/16/22: 1.676 m (5' 6\").    Weight as of this encounter: 133.8 kg (295 lb).       At today's " visit with Erica Gordon , we discussed results, diagnosis, medications and formulated a plan.  We also discussed red flags for immediate return to clinic/ER, as well as indications for follow up with PCP if not improved in 3 days. Patient understood and agreed to plan. Erica Gordon was discharged with stable vitals and has no further questions.       No follow-ups on file.    ERI Reilly, YOKASTA  [facility] HAJA Maldonado is a 32 year old, presenting for the following health issues:  Cough (5 days), Fever (2 days), and Musculoskeletal Problem (Body aches)      HPI   Review of Systems   Constitutional, HEENT, cardiovascular, pulmonary, gi and gu systems are negative, except as otherwise noted.      Objective    BP (!) 137/104   Pulse 85   Temp 98.3  F (36.8  C)   Resp 20   Wt 133.8 kg (295 lb)   SpO2 98%   BMI 47.61 kg/m    Body mass index is 47.61 kg/m .  Physical Exam   GENERAL: healthy, alert and no distress  EYES: Eyes grossly normal to inspection, PERRL and conjunctivae and sclerae normal  HENT: ear canals and TM's normal, nose and mouth without ulcers or lesions  NECK: no adenopathy, no asymmetry, masses, or scars and thyroid normal to palpation  RESP: rhonchi bibasilar  CV: regular rate and rhythm, normal S1 S2, no S3 or S4, no murmur, click or rub, no peripheral edema and peripheral pulses strong  MS: no gross musculoskeletal defects noted, no edema  SKIN: no suspicious lesions or rashes  NEURO: Normal strength and tone, mentation intact and speech normal  PSYCH: mentation appears normal, affect normal/bright    Chest xray Negative for acute findings, read by Sylvester HWANG at time of visit.      Influenza A & B Antigen - Clinic Collect     Status: Abnormal    Specimen: Nose; Swab   Result Value Ref Range    Influenza A antigen Positive (A) Negative    Influenza B antigen Negative Negative    Narrative    Test results must be correlated with  clinical data. If necessary, results should be confirmed by a molecular assay or viral culture.   Streptococcus A Rapid Screen w/Reflex to PCR - Clinic Collect     Status: Normal    Specimen: Throat; Swab   Result Value Ref Range    Group A Strep antigen Negative Negative   CBC with platelets and differential     Status: None (In process)    Narrative    The following orders were created for panel order CBC with platelets and differential.  Procedure                               Abnormality         Status                     ---------                               -----------         ------                     CBC with platelets and d...[[id number]]                      In process                   Please view results for these tests on the individual orders.

## 2023-03-06 ENCOUNTER — VIRTUAL VISIT (OUTPATIENT)
Dept: INTERNAL MEDICINE | Facility: CLINIC | Age: 33
End: 2023-03-06
Payer: COMMERCIAL

## 2023-03-06 DIAGNOSIS — R06.2 WHEEZING: ICD-10-CM

## 2023-03-06 DIAGNOSIS — F33.0 MILD RECURRENT MAJOR DEPRESSION (H): Primary | ICD-10-CM

## 2023-03-06 PROCEDURE — 99213 OFFICE O/P EST LOW 20 MIN: CPT | Mod: VID | Performed by: INTERNAL MEDICINE

## 2023-03-06 RX ORDER — FLUOXETINE 10 MG/1
10 CAPSULE ORAL DAILY
Qty: 30 CAPSULE | Refills: 3 | Status: SHIPPED | OUTPATIENT
Start: 2023-03-06 | End: 2023-06-12

## 2023-03-06 RX ORDER — ALBUTEROL SULFATE 90 UG/1
2 AEROSOL, METERED RESPIRATORY (INHALATION) EVERY 6 HOURS
Qty: 18 G | Refills: 0 | Status: SHIPPED | OUTPATIENT
Start: 2023-03-06 | End: 2023-04-10

## 2023-03-06 ASSESSMENT — ANXIETY QUESTIONNAIRES
GAD7 TOTAL SCORE: 18
6. BECOMING EASILY ANNOYED OR IRRITABLE: NEARLY EVERY DAY
GAD7 TOTAL SCORE: 18
3. WORRYING TOO MUCH ABOUT DIFFERENT THINGS: MORE THAN HALF THE DAYS
8. IF YOU CHECKED OFF ANY PROBLEMS, HOW DIFFICULT HAVE THESE MADE IT FOR YOU TO DO YOUR WORK, TAKE CARE OF THINGS AT HOME, OR GET ALONG WITH OTHER PEOPLE?: EXTREMELY DIFFICULT
2. NOT BEING ABLE TO STOP OR CONTROL WORRYING: NEARLY EVERY DAY
IF YOU CHECKED OFF ANY PROBLEMS ON THIS QUESTIONNAIRE, HOW DIFFICULT HAVE THESE PROBLEMS MADE IT FOR YOU TO DO YOUR WORK, TAKE CARE OF THINGS AT HOME, OR GET ALONG WITH OTHER PEOPLE: EXTREMELY DIFFICULT
7. FEELING AFRAID AS IF SOMETHING AWFUL MIGHT HAPPEN: MORE THAN HALF THE DAYS
1. FEELING NERVOUS, ANXIOUS, OR ON EDGE: NEARLY EVERY DAY
4. TROUBLE RELAXING: NEARLY EVERY DAY
5. BEING SO RESTLESS THAT IT IS HARD TO SIT STILL: MORE THAN HALF THE DAYS
GAD7 TOTAL SCORE: 18
7. FEELING AFRAID AS IF SOMETHING AWFUL MIGHT HAPPEN: MORE THAN HALF THE DAYS

## 2023-03-06 ASSESSMENT — ASTHMA QUESTIONNAIRES
QUESTION_2 LAST FOUR WEEKS HOW OFTEN HAVE YOU HAD SHORTNESS OF BREATH: ONCE OR TWICE A WEEK
QUESTION_4 LAST FOUR WEEKS HOW OFTEN HAVE YOU USED YOUR RESCUE INHALER OR NEBULIZER MEDICATION (SUCH AS ALBUTEROL): NOT AT ALL
QUESTION_3 LAST FOUR WEEKS HOW OFTEN DID YOUR ASTHMA SYMPTOMS (WHEEZING, COUGHING, SHORTNESS OF BREATH, CHEST TIGHTNESS OR PAIN) WAKE YOU UP AT NIGHT OR EARLIER THAN USUAL IN THE MORNING: ONCE A WEEK
QUESTION_5 LAST FOUR WEEKS HOW WOULD YOU RATE YOUR ASTHMA CONTROL: POORLY CONTROLLED
ACT_TOTALSCORE: 17
ACT_TOTALSCORE: 17
QUESTION_1 LAST FOUR WEEKS HOW MUCH OF THE TIME DID YOUR ASTHMA KEEP YOU FROM GETTING AS MUCH DONE AT WORK, SCHOOL OR AT HOME: SOME OF THE TIME

## 2023-03-06 ASSESSMENT — PATIENT HEALTH QUESTIONNAIRE - PHQ9
SUM OF ALL RESPONSES TO PHQ QUESTIONS 1-9: 21
SUM OF ALL RESPONSES TO PHQ QUESTIONS 1-9: 21
10. IF YOU CHECKED OFF ANY PROBLEMS, HOW DIFFICULT HAVE THESE PROBLEMS MADE IT FOR YOU TO DO YOUR WORK, TAKE CARE OF THINGS AT HOME, OR GET ALONG WITH OTHER PEOPLE: EXTREMELY DIFFICULT

## 2023-03-06 NOTE — PROGRESS NOTES
Erica is a 32 year old who is being evaluated via a billable video visit.      How would you like to obtain your AVS? MyChart  If the video visit is dropped, the invitation should be resent by: Text to cell phone: [phone]  Will anyone else be joining your video visit? No        Assessment & Plan     Mild recurrent major depression (H)  Trial of fluoxetine again, as seemed to be effective in the past.  Briefly reviewed the mechanism of action of this medication, reiterated that it will take a few weeks for its maximum effect to be felt.  Follow-up with PCP in the next 4 to 6 weeks.  - FLUoxetine (PROZAC) 10 MG capsule; Take 1 capsule (10 mg) by mouth daily    Wheezing  Request refill of albuterol  - albuterol (PROVENTIL HFA) 108 (90 Base) MCG/ACT inhaler; Inhale 2 puffs into the lungs every 6 hours             See Patient Instructions    Return in about 4 weeks (around 4/3/2023) for Mood Check.    Boo Murry MD  [facility]   Erica is a 32 year old, presenting for the following health issues:  Depression      History of Present Illness       Mental Health Follow-up:  Patient presents to follow-up on Depression & Anxiety.Patient's depression since last visit has been:  Worse  The patient is having other symptoms associated with depression.  Patient's anxiety since last visit has been:  Worse  The patient is having other symptoms associated with anxiety.  Any significant life events: No  Patient is feeling anxious or having panic attacks.  Patient has no concerns about alcohol or drug use.    She eats 2-3 servings of fruits and vegetables daily.She consumes 2 sweetened beverage(s) daily.She exercises with enough effort to increase her heart rate 20 to 29 minutes per day.  She exercises with enough effort to increase her heart rate 3 or less days per week.   She is taking medications regularly.    Today's PHQ-9         PHQ-9 Total Score: 21    PHQ-9 Q9  Thoughts of better off dead/self-harm past 2 weeks :   Not at all    How difficult have these problems made it for you to do your work, take care of things at home, or get along with other people: Extremely difficult  Today's TANMAY-7 Score: 18         As above.  Mixture of depressive and anxiety symptoms, see TANMAY-7 and PHQ-9 scores.  No suicidal ideation.  Does have a history of opiate abuse and buprenorphine use, but she has been off that medication since this February.  Has been on fluoxetine in the past which did seem to be effective.  Also mentions trazodone and Xanax.      Review of Systems   Constitutional, HEENT, cardiovascular, pulmonary, gi and gu systems are negative, except as otherwise noted.      Objective           Vitals:  No vitals were obtained today due to virtual visit.    Physical Exam   GENERAL: Healthy, alert and no distress  EYES: Eyes grossly normal to inspection.  No discharge or erythema, or obvious scleral/conjunctival abnormalities.  RESP: No audible wheeze, cough, or visible cyanosis.  No visible retractions or increased work of breathing.    SKIN: Visible skin clear. No significant rash, abnormal pigmentation or lesions.  NEURO: Cranial nerves grossly intact.  Mentation and speech appropriate for age.  PSYCH: Mentation appears normal, affect normal/bright, judgement and insight intact, normal speech and appearance well-groomed.                Video-Visit Details    Type of service:  Video Visit   Video Start Time: 5:31 PM  Video End Time:5:35 PM    Originating Location (pt. Location): Home  Distant Location (provider location):  On-site  Platform used for Video Visit: Triggit

## 2023-04-02 ENCOUNTER — HEALTH MAINTENANCE LETTER (OUTPATIENT)
Age: 33
End: 2023-04-02

## 2023-04-07 DIAGNOSIS — R06.2 WHEEZING: ICD-10-CM

## 2023-04-07 RX ORDER — ALBUTEROL SULFATE 90 UG/1
2 AEROSOL, METERED RESPIRATORY (INHALATION) EVERY 6 HOURS
Qty: 18 G | Refills: 0 | OUTPATIENT
Start: 2023-04-07

## 2023-04-10 RX ORDER — ALBUTEROL SULFATE 90 UG/1
2 AEROSOL, METERED RESPIRATORY (INHALATION) EVERY 6 HOURS
Qty: 18 G | Refills: 0 | Status: SHIPPED | OUTPATIENT
Start: 2023-04-10 | End: 2023-05-02

## 2023-05-02 DIAGNOSIS — R06.2 WHEEZING: ICD-10-CM

## 2023-05-02 RX ORDER — ALBUTEROL SULFATE 90 UG/1
AEROSOL, METERED RESPIRATORY (INHALATION)
Qty: 18 G | Refills: 0 | Status: SHIPPED | OUTPATIENT
Start: 2023-05-02 | End: 2023-05-30

## 2023-05-30 DIAGNOSIS — R06.2 WHEEZING: ICD-10-CM

## 2023-05-31 RX ORDER — ALBUTEROL SULFATE 90 UG/1
2 AEROSOL, METERED RESPIRATORY (INHALATION) EVERY 6 HOURS
Qty: 18 G | Refills: 0 | Status: ON HOLD | OUTPATIENT
Start: 2023-05-31 | End: 2024-06-14

## 2023-06-11 ASSESSMENT — ANXIETY QUESTIONNAIRES
IF YOU CHECKED OFF ANY PROBLEMS ON THIS QUESTIONNAIRE, HOW DIFFICULT HAVE THESE PROBLEMS MADE IT FOR YOU TO DO YOUR WORK, TAKE CARE OF THINGS AT HOME, OR GET ALONG WITH OTHER PEOPLE: EXTREMELY DIFFICULT
GAD7 TOTAL SCORE: 18
7. FEELING AFRAID AS IF SOMETHING AWFUL MIGHT HAPPEN: MORE THAN HALF THE DAYS
2. NOT BEING ABLE TO STOP OR CONTROL WORRYING: MORE THAN HALF THE DAYS
7. FEELING AFRAID AS IF SOMETHING AWFUL MIGHT HAPPEN: MORE THAN HALF THE DAYS
GAD7 TOTAL SCORE: 18
3. WORRYING TOO MUCH ABOUT DIFFERENT THINGS: NEARLY EVERY DAY
5. BEING SO RESTLESS THAT IT IS HARD TO SIT STILL: MORE THAN HALF THE DAYS
4. TROUBLE RELAXING: NEARLY EVERY DAY
8. IF YOU CHECKED OFF ANY PROBLEMS, HOW DIFFICULT HAVE THESE MADE IT FOR YOU TO DO YOUR WORK, TAKE CARE OF THINGS AT HOME, OR GET ALONG WITH OTHER PEOPLE?: EXTREMELY DIFFICULT
6. BECOMING EASILY ANNOYED OR IRRITABLE: NEARLY EVERY DAY
GAD7 TOTAL SCORE: 18
1. FEELING NERVOUS, ANXIOUS, OR ON EDGE: NEARLY EVERY DAY

## 2023-06-11 ASSESSMENT — ASTHMA QUESTIONNAIRES
ACT_TOTALSCORE: 19
QUESTION_4 LAST FOUR WEEKS HOW OFTEN HAVE YOU USED YOUR RESCUE INHALER OR NEBULIZER MEDICATION (SUCH AS ALBUTEROL): NOT AT ALL
QUESTION_3 LAST FOUR WEEKS HOW OFTEN DID YOUR ASTHMA SYMPTOMS (WHEEZING, COUGHING, SHORTNESS OF BREATH, CHEST TIGHTNESS OR PAIN) WAKE YOU UP AT NIGHT OR EARLIER THAN USUAL IN THE MORNING: ONCE A WEEK
QUESTION_5 LAST FOUR WEEKS HOW WOULD YOU RATE YOUR ASTHMA CONTROL: SOMEWHAT CONTROLLED
ACT_TOTALSCORE: 19
QUESTION_1 LAST FOUR WEEKS HOW MUCH OF THE TIME DID YOUR ASTHMA KEEP YOU FROM GETTING AS MUCH DONE AT WORK, SCHOOL OR AT HOME: A LITTLE OF THE TIME
QUESTION_2 LAST FOUR WEEKS HOW OFTEN HAVE YOU HAD SHORTNESS OF BREATH: ONCE OR TWICE A WEEK

## 2023-06-12 ENCOUNTER — VIRTUAL VISIT (OUTPATIENT)
Dept: FAMILY MEDICINE | Facility: CLINIC | Age: 33
End: 2023-06-12
Payer: COMMERCIAL

## 2023-06-12 DIAGNOSIS — F41.1 GENERALIZED ANXIETY DISORDER: ICD-10-CM

## 2023-06-12 DIAGNOSIS — F33.0 MILD RECURRENT MAJOR DEPRESSION (H): Primary | ICD-10-CM

## 2023-06-12 PROCEDURE — 99214 OFFICE O/P EST MOD 30 MIN: CPT | Mod: VID | Performed by: FAMILY MEDICINE

## 2023-06-12 ASSESSMENT — ANXIETY QUESTIONNAIRES: GAD7 TOTAL SCORE: 18

## 2023-06-12 ASSESSMENT — PATIENT HEALTH QUESTIONNAIRE - PHQ9
10. IF YOU CHECKED OFF ANY PROBLEMS, HOW DIFFICULT HAVE THESE PROBLEMS MADE IT FOR YOU TO DO YOUR WORK, TAKE CARE OF THINGS AT HOME, OR GET ALONG WITH OTHER PEOPLE: EXTREMELY DIFFICULT
SUM OF ALL RESPONSES TO PHQ QUESTIONS 1-9: 21

## 2023-06-12 NOTE — PROGRESS NOTES
Erica is a 32 year old who is being evaluated via a billable video visit.      How would you like to obtain your AVS? [employer]  If the video visit is dropped, the invitation should be resent by: Text to cell phone: [phone]  Will anyone else be joining your video visit? No        Assessment & Plan   Erica was seen today for mh follow up.    Mild recurrent major depression (H), uncontrolled  - PHQ-9/TANMAY 7 completed, see below/Epic for details    - Increase dose: FLUoxetine (PROZAC) 20 MG capsule  Dispense: 30 capsule; Refill: 1  - Offered referral for Psychotherapy Counseling- patient declined at this time. States that it did not work well for her in the past.       Generalized anxiety disorder, uncontrolled  - PHQ-9/TANMAY 7 completed, see below/Epic for details     - Increase dose: FLUoxetine (PROZAC) 20 MG capsule; Take 1 capsule (20 mg) by mouth daily  - Offered referral for Psychotherapy Counseling- patient declined at this time. States that it did not work well for her in the past.     Depression self care kit discussed- see AVS for details.       Return in about 1 month (around 7/12/2023) for Medication check, Virtual Visit with PCP.      Ashley Paige MD  [facility] RACHAEL Maldonado is a 32 year old, presenting for the following health issues:  MH Follow Up        6/12/2023     8:11 AM   Additional Questions   Roomed by Pt completed questions via [employer]     History of Present Illness       Mental Health Follow-up:  Patient presents to follow-up on Depression & Anxiety.Patient's depression since last visit has been:  No change  The patient is not having other symptoms associated with depression.  Patient's anxiety since last visit has been:  Bad  The patient is having other symptoms associated with anxiety.  Any significant life events: relationship concerns, job concerns and financial concerns  Patient is feeling anxious or having panic attacks.  Patient has no concerns about  alcohol or drug use.    She eats 2-3 servings of fruits and vegetables daily.She consumes 2 sweetened beverage(s) daily.She exercises with enough effort to increase her heart rate 30 to 60 minutes per day.  She exercises with enough effort to increase her heart rate 5 days per week.   She is taking medications regularly.    Today's PHQ-9         PHQ-9 Total Score: 21    PHQ-9 Q9 Thoughts of better off dead/self-harm past 2 weeks :   Not at all    How difficult have these problems made it for you to do your work, take care of things at home, or get along with other people: Extremely difficult  Today's TANMAY-7 Score: 18    Patient reports that she was started on Fluoxetine 10 mg about 3 months ago. States that she noticed a positive improvement right away in 2 weeks but since then she's plateaued. Feeling easily irritable. Would like to explore options to increase dose.   PHQ-9/TANMAY 7 completed, shown below-          6/11/2023     5:05 PM   Last PHQ-9   1.  Little interest or pleasure in doing things 3   2.  Feeling down, depressed, or hopeless 3   3.  Trouble falling or staying asleep, or sleeping too much 2   4.  Feeling tired or having little energy 3   5.  Poor appetite or overeating 3   6.  Feeling bad about yourself 3   7.  Trouble concentrating 2   8.  Moving slowly or restless 2   Q9: Thoughts of better off dead/self-harm past 2 weeks 0   PHQ-9 Total Score 21         6/11/2023     5:07 PM   TANMAY-7    1. Feeling nervous, anxious, or on edge 3   2. Not being able to stop or control worrying 2   3. Worrying too much about different things 3   4. Trouble relaxing 3   5. Being so restless that it is hard to sit still 2   6. Becoming easily annoyed or irritable 3   7. Feeling afraid, as if something awful might happen 2   TANMAY-7 Total Score 18   If you checked any problems, how difficult have they made it for you to do your work, take care of things at home, or get along with other people? Extremely difficult     In the  past two weeks have you had thoughts of suicide or self-harm?  No.    Do you have concerns about your personal safety or the safety of others?   No      Review of Systems   Constitutional, HEENT, cardiovascular, pulmonary, gi and gu systems are negative, except as otherwise noted.      Objective           Vitals:  No vitals were obtained today due to virtual visit.    Physical Exam   GENERAL: Healthy, alert and no distress  RESP: No audible wheeze, cough, or visible cyanosis.  No visible retractions or increased work of breathing.    PSYCH: mentation appears normal, affect flat, anxious, judgement and insight intact and appearance well groomed            Video-Visit Details    Type of service:  Video Visit   Video Start Time: 3:00 pm   Video End Time:3:20 pm     Originating Location (pt. Location): Home  Distant Location (provider location):  On-site  Platform used for Video Visit: Skip

## 2023-06-12 NOTE — PATIENT INSTRUCTIONS
Depression Self Care Plan / Survival Kit    Self-Care for Depression  Here s the deal. Your body and mind are really not as separate as most people think.  What you do and think affects how you feel and how you feel influences what you do and think. This means if you do things that people who feel good do, it will help you feel better.  Sometimes this is all it takes.  There is also a place for medication and therapy depending on how severe your depression is, so be sure to consult with your medical provider and/ or Behavioral Health Consultant if your symptoms are worsening or not improving.     In order to better manage my stress, I will:    Exercise  Get some form of exercise, every day. This will help reduce pain and release endorphins, the  feel good  chemicals in your brain. This is almost as good as taking antidepressants!  This is not the same as joining a gym and then never going! (they count on that by the way ) It can be as simple as just going for a walk or doing some gardening, anything that will get you moving.      Hygiene   Maintain good hygiene (Get out of bed in the morning, Make your bed, Brush your teeth, Take a shower, and Get dressed like you were going to work, even if you are unemployed).  If your clothes don't fit try to get ones that do.    Diet  I will strive to eat foods that are good for me, drink plenty of water, and avoid excessive sugar, caffeine, alcohol, and other mood-altering substances.  Some foods that are helpful in depression are: complex carbohydrates, B vitamins, flaxseed, fish or fish oil, fresh fruits and vegetables.    Psychotherapy  I agree to participate in Individual Therapy (if recommended).    Medication  If prescribed medications, I agree to take them.  Missing doses can result in serious side effects.  I understand that drinking alcohol, or other illicit drug use, may cause potential side effects.  I will not stop my medication abruptly without first discussing it  with my provider.    Staying Connected With Others  I will stay in touch with my friends, family members, and my primary care provider/team.    Use your imagination  Be creative.  We all have a creative side; it doesn t matter if it s oil painting, sand castles, or mud pies! This will also kick up the endorphins.    Witness Beauty  (AKA stop and smell the roses) Take a look outside, even in mid-winter. Notice colors, textures. Watch the squirrels and birds.     Service to others  Be of service to others.  There is always someone else in need.  By helping others we can  get out of ourselves  and remember the really important things.  This also provides opportunities for practicing all the other parts of the program.    Humor  Laugh and be silly!  Adjust your TV habits for less news and crime-drama and more comedy.    Control your stress  Try breathing deep, massage therapy, biofeedback, and meditation. Find time to relax each day.

## 2023-07-06 ENCOUNTER — TELEPHONE (OUTPATIENT)
Dept: FAMILY MEDICINE | Facility: CLINIC | Age: 33
End: 2023-07-06
Payer: COMMERCIAL

## 2023-07-06 NOTE — LETTER
Erica Gordon  [address] [facility] [address]        Dear Erica,    This is a friendly reminder that you are overdue or soon to be due for your Annual Physical appointment.  Please give us a call at the number listed below to schedule this appointment.  If you already have one set up, please disregard this message/letter.      Thank you.      GREGORIO [address] [facility] [address]  [address] [place] [address] [phone]

## 2023-07-06 NOTE — TELEPHONE ENCOUNTER
Patient Quality Outreach    Patient is due for the following:   Asthma  -  ACT needed and AAP  Cervical Cancer Screening - PAP Needed  Physical Preventive Adult Physical    Next Steps:   Schedule a Adult Preventative    Type of outreach:    Phone, left message for patient/parent to call back.      Questions for provider review:    None           Nicolás Trejo CMA  Chart routed to Provider.

## 2023-07-11 NOTE — TELEPHONE ENCOUNTER
Patient Quality Outreach        Type of outreach:    Sent [employer] message.      Questions for provider review:    None           Nicolás Trejo CMA  Chart routed to Provider.

## 2023-07-18 NOTE — TELEPHONE ENCOUNTER
Patient Quality Outreach        Type of outreach:    Sent letter.      Questions for provider review:    None           Nicolás Trejo, GREGORIO  Chart routed to Provider.

## 2024-04-09 ENCOUNTER — TELEPHONE (OUTPATIENT)
Dept: FAMILY MEDICINE | Facility: CLINIC | Age: 34
End: 2024-04-09

## 2024-04-09 NOTE — LETTER
May 23, 2024      Erica Gordon  [address] [facility] [address]    Your team at [facility] cares about your health. We have reviewed your chart and based on our findings; we are making the following recommendations to better manage your health.       We see you have read your [employer] message but have not scheduled. Please call [phone] to schedule.     You are in particular need of attention regarding the following:     Schedule a primary care office visit with your provider for a Pap Smear to screen for Cervical Cancer.  PREVENTATIVE VISIT: Physical  Please schedule a Nurse Only Appointment with your primary care clinic to update your immunizations that are due.  OTHER FOLLOW UP: Asthma and Mental Health    If you have already completed these items, please contact the clinic via phone or   [facility] so your care team can review and update your records. Thank you for   choosing [facility] for your healthcare needs. For any questions,   concerns, or to schedule an appointment please contact our clinic.    Healthy Regards,      Your [facility] Care Team

## 2024-05-23 NOTE — TELEPHONE ENCOUNTER
Patient Quality Outreach    Type of outreach:    Sent letter.    Next Steps:  Reach out within 90 days via Phone, MyChart, and Letter.    Max number of attempts reached: No. Will try again in 90 days if patient still on fail list.        Jeanne Ceballos MA  Chart routed to Care Team.

## 2024-06-02 ENCOUNTER — HEALTH MAINTENANCE LETTER (OUTPATIENT)
Age: 34
End: 2024-06-02

## 2024-06-13 ENCOUNTER — APPOINTMENT (OUTPATIENT)
Dept: CT IMAGING | Facility: CLINIC | Age: 34
End: 2024-06-13
Payer: MEDICAID

## 2024-06-13 ENCOUNTER — HOSPITAL ENCOUNTER (INPATIENT)
Facility: CLINIC | Age: 34
LOS: 2 days | Discharge: HOME | End: 2024-06-16
Attending: EMERGENCY MEDICINE | Admitting: HOSPITALIST
Payer: MEDICAID

## 2024-06-13 ENCOUNTER — APPOINTMENT (OUTPATIENT)
Dept: ULTRASOUND IMAGING | Facility: CLINIC | Age: 34
End: 2024-06-13
Payer: MEDICAID

## 2024-06-13 DIAGNOSIS — M27.2 MANDIBULAR ABSCESS: ICD-10-CM

## 2024-06-13 DIAGNOSIS — R10.9 ABDOMINAL PAIN: ICD-10-CM

## 2024-06-13 DIAGNOSIS — N83.8 OVARIAN MASS: ICD-10-CM

## 2024-06-13 DIAGNOSIS — K59.01 SLOW TRANSIT CONSTIPATION: Primary | ICD-10-CM

## 2024-06-13 LAB
ALBUMIN UR-MCNC: 30 MG/DL
ANION GAP SERPL CALCULATED.3IONS-SCNC: 14 MMOL/L (ref 7–15)
APPEARANCE UR: ABNORMAL
BACTERIA #/AREA URNS HPF: ABNORMAL /HPF
BASOPHILS # BLD AUTO: 0.1 10E3/UL (ref 0–0.2)
BASOPHILS NFR BLD AUTO: 1 %
BILIRUB UR QL STRIP: NEGATIVE
BUN SERPL-MCNC: 19.4 MG/DL (ref 6–20)
CALCIUM SERPL-MCNC: 9.6 MG/DL (ref 8.6–10)
CHLORIDE SERPL-SCNC: 102 MMOL/L (ref 98–107)
COLOR UR AUTO: YELLOW
CREAT SERPL-MCNC: 1.28 MG/DL (ref 0.51–0.95)
DEPRECATED HCO3 PLAS-SCNC: 21 MMOL/L (ref 22–29)
EGFRCR SERPLBLD CKD-EPI 2021: 56 ML/MIN/1.73M2
EOSINOPHIL # BLD AUTO: 0 10E3/UL (ref 0–0.7)
EOSINOPHIL NFR BLD AUTO: 0 %
ERYTHROCYTE [DISTWIDTH] IN BLOOD BY AUTOMATED COUNT: 13.6 % (ref 10–15)
GLUCOSE SERPL-MCNC: 117 MG/DL (ref 70–99)
GLUCOSE UR STRIP-MCNC: 70 MG/DL
HCG SERPL QL: NEGATIVE
HCT VFR BLD AUTO: 42.7 % (ref 35–47)
HGB BLD-MCNC: 14.3 G/DL (ref 11.7–15.7)
HGB UR QL STRIP: NEGATIVE
IMM GRANULOCYTES # BLD: 0.1 10E3/UL
IMM GRANULOCYTES NFR BLD: 0 %
KETONES UR STRIP-MCNC: 20 MG/DL
LEUKOCYTE ESTERASE UR QL STRIP: ABNORMAL
LYMPHOCYTES # BLD AUTO: 1.6 10E3/UL (ref 0.8–5.3)
LYMPHOCYTES NFR BLD AUTO: 10 %
MCH RBC QN AUTO: 30.2 PG (ref 26.5–33)
MCHC RBC AUTO-ENTMCNC: 33.5 G/DL (ref 31.5–36.5)
MCV RBC AUTO: 90 FL (ref 78–100)
MONOCYTES # BLD AUTO: 0.9 10E3/UL (ref 0–1.3)
MONOCYTES NFR BLD AUTO: 6 %
MUCOUS THREADS #/AREA URNS LPF: PRESENT /LPF
NEUTROPHILS # BLD AUTO: 13 10E3/UL (ref 1.6–8.3)
NEUTROPHILS NFR BLD AUTO: 83 %
NITRATE UR QL: NEGATIVE
NRBC # BLD AUTO: 0 10E3/UL
NRBC BLD AUTO-RTO: 0 /100
PH UR STRIP: 6 [PH] (ref 5–7)
PLATELET # BLD AUTO: 405 10E3/UL (ref 150–450)
POTASSIUM SERPL-SCNC: 4.5 MMOL/L (ref 3.4–5.3)
RBC # BLD AUTO: 4.73 10E6/UL (ref 3.8–5.2)
RBC URINE: 4 /HPF
SODIUM SERPL-SCNC: 137 MMOL/L (ref 135–145)
SP GR UR STRIP: 1.03 (ref 1–1.03)
SQUAMOUS EPITHELIAL: 3 /HPF
UROBILINOGEN UR STRIP-MCNC: NORMAL MG/DL
WBC # BLD AUTO: 15.6 10E3/UL (ref 4–11)
WBC URINE: 46 /HPF

## 2024-06-13 PROCEDURE — 258N000003 HC RX IP 258 OP 636: Mod: JZ

## 2024-06-13 PROCEDURE — 86304 IMMUNOASSAY TUMOR CA 125: CPT | Performed by: HOSPITALIST

## 2024-06-13 PROCEDURE — 74177 CT ABD & PELVIS W/CONTRAST: CPT

## 2024-06-13 PROCEDURE — 86336 INHIBIN A: CPT | Performed by: HOSPITALIST

## 2024-06-13 PROCEDURE — 76775 US EXAM ABDO BACK WALL LIM: CPT

## 2024-06-13 PROCEDURE — 85025 COMPLETE CBC W/AUTO DIFF WBC: CPT | Performed by: EMERGENCY MEDICINE

## 2024-06-13 PROCEDURE — 250N000011 HC RX IP 250 OP 636: Mod: JZ

## 2024-06-13 PROCEDURE — 84703 CHORIONIC GONADOTROPIN ASSAY: CPT | Performed by: EMERGENCY MEDICINE

## 2024-06-13 PROCEDURE — 250N000009 HC RX 250

## 2024-06-13 PROCEDURE — 81001 URINALYSIS AUTO W/SCOPE: CPT | Performed by: EMERGENCY MEDICINE

## 2024-06-13 PROCEDURE — 82105 ALPHA-FETOPROTEIN SERUM: CPT | Performed by: HOSPITALIST

## 2024-06-13 PROCEDURE — 96361 HYDRATE IV INFUSION ADD-ON: CPT

## 2024-06-13 PROCEDURE — 36415 COLL VENOUS BLD VENIPUNCTURE: CPT | Performed by: EMERGENCY MEDICINE

## 2024-06-13 PROCEDURE — 96376 TX/PRO/DX INJ SAME DRUG ADON: CPT

## 2024-06-13 PROCEDURE — 87086 URINE CULTURE/COLONY COUNT: CPT | Performed by: EMERGENCY MEDICINE

## 2024-06-13 PROCEDURE — 82378 CARCINOEMBRYONIC ANTIGEN: CPT | Performed by: HOSPITALIST

## 2024-06-13 PROCEDURE — 96374 THER/PROPH/DIAG INJ IV PUSH: CPT | Mod: 59

## 2024-06-13 PROCEDURE — 80048 BASIC METABOLIC PNL TOTAL CA: CPT | Performed by: EMERGENCY MEDICINE

## 2024-06-13 PROCEDURE — 250N000011 HC RX IP 250 OP 636

## 2024-06-13 PROCEDURE — 99285 EMERGENCY DEPT VISIT HI MDM: CPT | Mod: 25

## 2024-06-13 PROCEDURE — 96375 TX/PRO/DX INJ NEW DRUG ADDON: CPT

## 2024-06-13 RX ORDER — ONDANSETRON 2 MG/ML
4 INJECTION INTRAMUSCULAR; INTRAVENOUS EVERY 30 MIN PRN
Status: DISCONTINUED | OUTPATIENT
Start: 2024-06-13 | End: 2024-06-14 | Stop reason: ALTCHOICE

## 2024-06-13 RX ORDER — HYDROMORPHONE HYDROCHLORIDE 1 MG/ML
0.5 INJECTION, SOLUTION INTRAMUSCULAR; INTRAVENOUS; SUBCUTANEOUS ONCE
Status: COMPLETED | OUTPATIENT
Start: 2024-06-13 | End: 2024-06-13

## 2024-06-13 RX ORDER — IOPAMIDOL 755 MG/ML
135 INJECTION, SOLUTION INTRAVASCULAR ONCE
Status: COMPLETED | OUTPATIENT
Start: 2024-06-13 | End: 2024-06-13

## 2024-06-13 RX ADMIN — HYDROMORPHONE HYDROCHLORIDE 0.5 MG: 1 INJECTION, SOLUTION INTRAMUSCULAR; INTRAVENOUS; SUBCUTANEOUS at 19:47

## 2024-06-13 RX ADMIN — SODIUM CHLORIDE, POTASSIUM CHLORIDE, SODIUM LACTATE AND CALCIUM CHLORIDE 1000 ML: 600; 310; 30; 20 INJECTION, SOLUTION INTRAVENOUS at 19:50

## 2024-06-13 RX ADMIN — HYDROMORPHONE HYDROCHLORIDE 0.5 MG: 1 INJECTION, SOLUTION INTRAMUSCULAR; INTRAVENOUS; SUBCUTANEOUS at 22:15

## 2024-06-13 RX ADMIN — SODIUM CHLORIDE 79 ML: 9 INJECTION, SOLUTION INTRAVENOUS at 23:42

## 2024-06-13 RX ADMIN — IOPAMIDOL 135 ML: 755 INJECTION, SOLUTION INTRAVENOUS at 23:42

## 2024-06-13 RX ADMIN — HYDROMORPHONE HYDROCHLORIDE 0.5 MG: 1 INJECTION, SOLUTION INTRAMUSCULAR; INTRAVENOUS; SUBCUTANEOUS at 20:55

## 2024-06-13 RX ADMIN — ONDANSETRON 4 MG: 2 INJECTION INTRAMUSCULAR; INTRAVENOUS at 19:46

## 2024-06-13 ASSESSMENT — COLUMBIA-SUICIDE SEVERITY RATING SCALE - C-SSRS
6. HAVE YOU EVER DONE ANYTHING, STARTED TO DO ANYTHING, OR PREPARED TO DO ANYTHING TO END YOUR LIFE?: NO
1. IN THE PAST MONTH, HAVE YOU WISHED YOU WERE DEAD OR WISHED YOU COULD GO TO SLEEP AND NOT WAKE UP?: NO
2. HAVE YOU ACTUALLY HAD ANY THOUGHTS OF KILLING YOURSELF IN THE PAST MONTH?: NO

## 2024-06-13 ASSESSMENT — ACTIVITIES OF DAILY LIVING (ADL)
ADLS_ACUITY_SCORE: 35

## 2024-06-14 ENCOUNTER — APPOINTMENT (OUTPATIENT)
Dept: MRI IMAGING | Facility: CLINIC | Age: 34
End: 2024-06-14
Attending: EMERGENCY MEDICINE
Payer: MEDICAID

## 2024-06-14 ENCOUNTER — ANESTHESIA (OUTPATIENT)
Dept: SURGERY | Facility: CLINIC | Age: 34
End: 2024-06-14
Payer: MEDICAID

## 2024-06-14 ENCOUNTER — ANESTHESIA EVENT (OUTPATIENT)
Dept: SURGERY | Facility: CLINIC | Age: 34
End: 2024-06-14
Payer: MEDICAID

## 2024-06-14 ENCOUNTER — APPOINTMENT (OUTPATIENT)
Dept: CT IMAGING | Facility: CLINIC | Age: 34
End: 2024-06-14
Attending: STUDENT IN AN ORGANIZED HEALTH CARE EDUCATION/TRAINING PROGRAM
Payer: MEDICAID

## 2024-06-14 PROBLEM — R10.9 ABDOMINAL PAIN: Status: ACTIVE | Noted: 2024-06-14

## 2024-06-14 LAB
ABO/RH(D): NORMAL
AFP SERPL-MCNC: 2.3 NG/ML
ALBUMIN SERPL BCG-MCNC: 4 G/DL (ref 3.5–5.2)
ALP SERPL-CCNC: 74 U/L (ref 40–150)
ALT SERPL W P-5'-P-CCNC: 14 U/L (ref 0–50)
ANION GAP SERPL CALCULATED.3IONS-SCNC: 17 MMOL/L (ref 7–15)
ANTIBODY SCREEN: NEGATIVE
AST SERPL W P-5'-P-CCNC: 21 U/L (ref 0–45)
BACTERIA SPEC CULT: NORMAL
BASE EXCESS BLDV CALC-SCNC: 1 MMOL/L (ref -3–3)
BILIRUB SERPL-MCNC: 0.6 MG/DL
BUN SERPL-MCNC: 15.3 MG/DL (ref 6–20)
CALCIUM SERPL-MCNC: 9.8 MG/DL (ref 8.6–10)
CANCER AG125 SERPL-ACNC: 39 U/ML
CEA SERPL-MCNC: 3.5 NG/ML
CHLORIDE SERPL-SCNC: 103 MMOL/L (ref 98–107)
CREAT SERPL-MCNC: 0.81 MG/DL (ref 0.51–0.95)
DEPRECATED HCO3 PLAS-SCNC: 20 MMOL/L (ref 22–29)
EGFRCR SERPLBLD CKD-EPI 2021: >90 ML/MIN/1.73M2
ERYTHROCYTE [DISTWIDTH] IN BLOOD BY AUTOMATED COUNT: 13.7 % (ref 10–15)
GLUCOSE SERPL-MCNC: 83 MG/DL (ref 70–99)
GRAM STAIN RESULT: NORMAL
GRAM STAIN RESULT: NORMAL
HCO3 BLDV-SCNC: 28 MMOL/L (ref 21–28)
HCT VFR BLD AUTO: 41.6 % (ref 35–47)
HGB BLD-MCNC: 13.6 G/DL (ref 11.7–15.7)
HOLD SPECIMEN: 0
HOLD SPECIMEN: NORMAL
LACTATE BLD-SCNC: 1 MMOL/L
LDH SERPL L TO P-CCNC: 157 U/L (ref 0–250)
MAGNESIUM SERPL-MCNC: 2.1 MG/DL (ref 1.7–2.3)
MCH RBC QN AUTO: 29.7 PG (ref 26.5–33)
MCHC RBC AUTO-ENTMCNC: 32.7 G/DL (ref 31.5–36.5)
MCV RBC AUTO: 91 FL (ref 78–100)
PCO2 BLDV: 52 MM HG (ref 40–50)
PH BLDV: 7.33 [PH] (ref 7.32–7.43)
PHOSPHATE SERPL-MCNC: 3.5 MG/DL (ref 2.5–4.5)
PLATELET # BLD AUTO: 334 10E3/UL (ref 150–450)
PO2 BLDV: 18 MM HG (ref 25–47)
POTASSIUM SERPL-SCNC: 5 MMOL/L (ref 3.4–5.3)
PROT SERPL-MCNC: 7.2 G/DL (ref 6.4–8.3)
RBC # BLD AUTO: 4.58 10E6/UL (ref 3.8–5.2)
SAO2 % BLDV: 23 % (ref 70–75)
SODIUM SERPL-SCNC: 140 MMOL/L (ref 135–145)
SPECIMEN EXPIRATION DATE: NORMAL
WBC # BLD AUTO: 14.2 10E3/UL (ref 4–11)

## 2024-06-14 PROCEDURE — 360N000076 HC SURGERY LEVEL 3, PER MIN: Performed by: OBSTETRICS & GYNECOLOGY

## 2024-06-14 PROCEDURE — 36415 COLL VENOUS BLD VENIPUNCTURE: CPT | Performed by: HOSPITALIST

## 2024-06-14 PROCEDURE — 258N000003 HC RX IP 258 OP 636: Mod: JZ | Performed by: ANESTHESIOLOGY

## 2024-06-14 PROCEDURE — 999N000141 HC STATISTIC PRE-PROCEDURE NURSING ASSESSMENT: Performed by: OBSTETRICS & GYNECOLOGY

## 2024-06-14 PROCEDURE — 83735 ASSAY OF MAGNESIUM: CPT | Performed by: HOSPITALIST

## 2024-06-14 PROCEDURE — 250N000011 HC RX IP 250 OP 636: Performed by: ANESTHESIOLOGY

## 2024-06-14 PROCEDURE — 250N000009 HC RX 250: Performed by: DENTIST

## 2024-06-14 PROCEDURE — 250N000011 HC RX IP 250 OP 636: Mod: JZ | Performed by: NURSE ANESTHETIST, CERTIFIED REGISTERED

## 2024-06-14 PROCEDURE — 250N000025 HC SEVOFLURANE, PER MIN: Performed by: OBSTETRICS & GYNECOLOGY

## 2024-06-14 PROCEDURE — 82803 BLOOD GASES ANY COMBINATION: CPT

## 2024-06-14 PROCEDURE — 250N000011 HC RX IP 250 OP 636: Mod: JZ

## 2024-06-14 PROCEDURE — 250N000011 HC RX IP 250 OP 636: Mod: JZ | Performed by: HOSPITALIST

## 2024-06-14 PROCEDURE — 272N000001 HC OR GENERAL SUPPLY STERILE: Performed by: OBSTETRICS & GYNECOLOGY

## 2024-06-14 PROCEDURE — 82040 ASSAY OF SERUM ALBUMIN: CPT | Performed by: HOSPITALIST

## 2024-06-14 PROCEDURE — 85027 COMPLETE CBC AUTOMATED: CPT | Performed by: HOSPITALIST

## 2024-06-14 PROCEDURE — 87205 SMEAR GRAM STAIN: CPT | Performed by: OBSTETRICS & GYNECOLOGY

## 2024-06-14 PROCEDURE — 250N000011 HC RX IP 250 OP 636: Mod: JZ | Performed by: OBSTETRICS & GYNECOLOGY

## 2024-06-14 PROCEDURE — 0UT04ZZ RESECTION OF RIGHT OVARY, PERCUTANEOUS ENDOSCOPIC APPROACH: ICD-10-PCS | Performed by: OBSTETRICS & GYNECOLOGY

## 2024-06-14 PROCEDURE — 88302 TISSUE EXAM BY PATHOLOGIST: CPT | Mod: TC | Performed by: OBSTETRICS & GYNECOLOGY

## 2024-06-14 PROCEDURE — 258N000003 HC RX IP 258 OP 636: Mod: JZ | Performed by: HOSPITALIST

## 2024-06-14 PROCEDURE — 120N000001 HC R&B MED SURG/OB

## 2024-06-14 PROCEDURE — 250N000009 HC RX 250: Performed by: NURSE ANESTHETIST, CERTIFIED REGISTERED

## 2024-06-14 PROCEDURE — 88305 TISSUE EXAM BY PATHOLOGIST: CPT | Mod: TC | Performed by: OBSTETRICS & GYNECOLOGY

## 2024-06-14 PROCEDURE — 250N000011 HC RX IP 250 OP 636: Mod: JZ | Performed by: ANESTHESIOLOGY

## 2024-06-14 PROCEDURE — 250N000011 HC RX IP 250 OP 636: Performed by: HOSPITALIST

## 2024-06-14 PROCEDURE — 250N000009 HC RX 250: Performed by: HOSPITALIST

## 2024-06-14 PROCEDURE — 258N000001 HC RX 258: Performed by: OBSTETRICS & GYNECOLOGY

## 2024-06-14 PROCEDURE — 250N000011 HC RX IP 250 OP 636: Mod: JZ | Performed by: STUDENT IN AN ORGANIZED HEALTH CARE EDUCATION/TRAINING PROGRAM

## 2024-06-14 PROCEDURE — P9045 ALBUMIN (HUMAN), 5%, 250 ML: HCPCS | Mod: JZ | Performed by: NURSE ANESTHETIST, CERTIFIED REGISTERED

## 2024-06-14 PROCEDURE — 250N000011 HC RX IP 250 OP 636: Performed by: NURSE ANESTHETIST, CERTIFIED REGISTERED

## 2024-06-14 PROCEDURE — 88302 TISSUE EXAM BY PATHOLOGIST: CPT | Mod: 26 | Performed by: STUDENT IN AN ORGANIZED HEALTH CARE EDUCATION/TRAINING PROGRAM

## 2024-06-14 PROCEDURE — 370N000017 HC ANESTHESIA TECHNICAL FEE, PER MIN: Performed by: OBSTETRICS & GYNECOLOGY

## 2024-06-14 PROCEDURE — 0J910ZZ DRAINAGE OF FACE SUBCUTANEOUS TISSUE AND FASCIA, OPEN APPROACH: ICD-10-PCS | Performed by: DENTIST

## 2024-06-14 PROCEDURE — 84100 ASSAY OF PHOSPHORUS: CPT | Performed by: HOSPITALIST

## 2024-06-14 PROCEDURE — 999N000127 HC STATISTIC PERIPHERAL IV START W US GUIDANCE

## 2024-06-14 PROCEDURE — 82374 ASSAY BLOOD CARBON DIOXIDE: CPT | Performed by: HOSPITALIST

## 2024-06-14 PROCEDURE — 87070 CULTURE OTHR SPECIMN AEROBIC: CPT | Performed by: OBSTETRICS & GYNECOLOGY

## 2024-06-14 PROCEDURE — 258N000003 HC RX IP 258 OP 636: Mod: JZ | Performed by: NURSE ANESTHETIST, CERTIFIED REGISTERED

## 2024-06-14 PROCEDURE — 0UB04ZX EXCISION OF RIGHT OVARY, PERCUTANEOUS ENDOSCOPIC APPROACH, DIAGNOSTIC: ICD-10-PCS | Performed by: OBSTETRICS & GYNECOLOGY

## 2024-06-14 PROCEDURE — 0UT74ZZ RESECTION OF BILATERAL FALLOPIAN TUBES, PERCUTANEOUS ENDOSCOPIC APPROACH: ICD-10-PCS | Performed by: OBSTETRICS & GYNECOLOGY

## 2024-06-14 PROCEDURE — 0CDXXZ1 EXTRACTION OF LOWER TOOTH, MULTIPLE, EXTERNAL APPROACH: ICD-10-PCS | Performed by: DENTIST

## 2024-06-14 PROCEDURE — 255N000002 HC RX 255 OP 636: Performed by: EMERGENCY MEDICINE

## 2024-06-14 PROCEDURE — 83615 LACTATE (LD) (LDH) ENZYME: CPT | Performed by: HOSPITALIST

## 2024-06-14 PROCEDURE — 99222 1ST HOSP IP/OBS MODERATE 55: CPT | Performed by: HOSPITALIST

## 2024-06-14 PROCEDURE — 710N000009 HC RECOVERY PHASE 1, LEVEL 1, PER MIN: Performed by: OBSTETRICS & GYNECOLOGY

## 2024-06-14 PROCEDURE — 250N000011 HC RX IP 250 OP 636: Mod: JZ | Performed by: EMERGENCY MEDICINE

## 2024-06-14 PROCEDURE — 49320 DIAG LAPARO SEPARATE PROC: CPT | Performed by: NURSE ANESTHETIST, CERTIFIED REGISTERED

## 2024-06-14 PROCEDURE — 86900 BLOOD TYPING SEROLOGIC ABO: CPT

## 2024-06-14 PROCEDURE — A9585 GADOBUTROL INJECTION: HCPCS | Performed by: EMERGENCY MEDICINE

## 2024-06-14 PROCEDURE — 250N000013 HC RX MED GY IP 250 OP 250 PS 637: Performed by: HOSPITALIST

## 2024-06-14 PROCEDURE — 49320 DIAG LAPARO SEPARATE PROC: CPT | Performed by: ANESTHESIOLOGY

## 2024-06-14 PROCEDURE — 99255 IP/OBS CONSLTJ NEW/EST HI 80: CPT | Mod: 57 | Performed by: OBSTETRICS & GYNECOLOGY

## 2024-06-14 PROCEDURE — 88305 TISSUE EXAM BY PATHOLOGIST: CPT | Mod: 26 | Performed by: STUDENT IN AN ORGANIZED HEALTH CARE EDUCATION/TRAINING PROGRAM

## 2024-06-14 PROCEDURE — 250N000009 HC RX 250: Performed by: ANESTHESIOLOGY

## 2024-06-14 PROCEDURE — 70491 CT SOFT TISSUE NECK W/DYE: CPT

## 2024-06-14 PROCEDURE — 87076 CULTURE ANAEROBE IDENT EACH: CPT | Performed by: OBSTETRICS & GYNECOLOGY

## 2024-06-14 PROCEDURE — 72196 MRI PELVIS W/DYE: CPT

## 2024-06-14 RX ORDER — PROCHLORPERAZINE 25 MG
25 SUPPOSITORY, RECTAL RECTAL EVERY 12 HOURS PRN
Status: DISCONTINUED | OUTPATIENT
Start: 2024-06-14 | End: 2024-06-16 | Stop reason: HOSPADM

## 2024-06-14 RX ORDER — POLYETHYLENE GLYCOL 3350 17 G/17G
17 POWDER, FOR SOLUTION ORAL 2 TIMES DAILY PRN
Status: DISCONTINUED | OUTPATIENT
Start: 2024-06-14 | End: 2024-06-16 | Stop reason: HOSPADM

## 2024-06-14 RX ORDER — DEXAMETHASONE SODIUM PHOSPHATE 4 MG/ML
4 INJECTION, SOLUTION INTRA-ARTICULAR; INTRALESIONAL; INTRAMUSCULAR; INTRAVENOUS; SOFT TISSUE
Status: DISCONTINUED | OUTPATIENT
Start: 2024-06-14 | End: 2024-06-14 | Stop reason: HOSPADM

## 2024-06-14 RX ORDER — OMEGA-3 FATTY ACIDS/FISH OIL 300-1000MG
400 CAPSULE ORAL EVERY 4 HOURS PRN
Status: ON HOLD | COMMUNITY
End: 2024-06-16

## 2024-06-14 RX ORDER — LIDOCAINE 40 MG/G
CREAM TOPICAL
Status: DISCONTINUED | OUTPATIENT
Start: 2024-06-14 | End: 2024-06-16 | Stop reason: HOSPADM

## 2024-06-14 RX ORDER — ACETAMINOPHEN 650 MG/1
650 SUPPOSITORY RECTAL EVERY 4 HOURS PRN
Status: DISCONTINUED | OUTPATIENT
Start: 2024-06-14 | End: 2024-06-16 | Stop reason: HOSPADM

## 2024-06-14 RX ORDER — ONDANSETRON 4 MG/1
4 TABLET, ORALLY DISINTEGRATING ORAL EVERY 30 MIN PRN
Status: DISCONTINUED | OUTPATIENT
Start: 2024-06-14 | End: 2024-06-14 | Stop reason: HOSPADM

## 2024-06-14 RX ORDER — NALOXONE HYDROCHLORIDE 0.4 MG/ML
0.1 INJECTION, SOLUTION INTRAMUSCULAR; INTRAVENOUS; SUBCUTANEOUS
Status: DISCONTINUED | OUTPATIENT
Start: 2024-06-14 | End: 2024-06-14 | Stop reason: HOSPADM

## 2024-06-14 RX ORDER — IBUPROFEN 600 MG/1
600 TABLET, FILM COATED ORAL EVERY 6 HOURS
Status: DISCONTINUED | OUTPATIENT
Start: 2024-06-14 | End: 2024-06-16 | Stop reason: HOSPADM

## 2024-06-14 RX ORDER — LIDOCAINE HYDROCHLORIDE 40 MG/ML
SOLUTION TOPICAL PRN
Status: DISCONTINUED | OUTPATIENT
Start: 2024-06-14 | End: 2024-06-14

## 2024-06-14 RX ORDER — HYDROMORPHONE HYDROCHLORIDE 1 MG/ML
0.5 INJECTION, SOLUTION INTRAMUSCULAR; INTRAVENOUS; SUBCUTANEOUS
Status: DISCONTINUED | OUTPATIENT
Start: 2024-06-14 | End: 2024-06-14

## 2024-06-14 RX ORDER — HYDROMORPHONE HCL IN WATER/PF 6 MG/30 ML
0.4 PATIENT CONTROLLED ANALGESIA SYRINGE INTRAVENOUS EVERY 5 MIN PRN
Status: DISCONTINUED | OUTPATIENT
Start: 2024-06-14 | End: 2024-06-14 | Stop reason: HOSPADM

## 2024-06-14 RX ORDER — COVID-19 ANTIGEN TEST
220 KIT MISCELLANEOUS 2 TIMES DAILY PRN
COMMUNITY

## 2024-06-14 RX ORDER — AMOXICILLIN 250 MG
1 CAPSULE ORAL 2 TIMES DAILY PRN
Status: DISCONTINUED | OUTPATIENT
Start: 2024-06-14 | End: 2024-06-16 | Stop reason: HOSPADM

## 2024-06-14 RX ORDER — AMOXICILLIN 250 MG
2 CAPSULE ORAL 2 TIMES DAILY PRN
Status: DISCONTINUED | OUTPATIENT
Start: 2024-06-14 | End: 2024-06-16 | Stop reason: HOSPADM

## 2024-06-14 RX ORDER — DEXAMETHASONE SODIUM PHOSPHATE 4 MG/ML
INJECTION, SOLUTION INTRA-ARTICULAR; INTRALESIONAL; INTRAMUSCULAR; INTRAVENOUS; SOFT TISSUE PRN
Status: DISCONTINUED | OUTPATIENT
Start: 2024-06-14 | End: 2024-06-14

## 2024-06-14 RX ORDER — ACETAMINOPHEN 325 MG/1
650 TABLET ORAL EVERY 4 HOURS PRN
Status: DISCONTINUED | OUTPATIENT
Start: 2024-06-14 | End: 2024-06-16 | Stop reason: HOSPADM

## 2024-06-14 RX ORDER — SODIUM CHLORIDE, SODIUM LACTATE, POTASSIUM CHLORIDE, CALCIUM CHLORIDE 600; 310; 30; 20 MG/100ML; MG/100ML; MG/100ML; MG/100ML
INJECTION, SOLUTION INTRAVENOUS CONTINUOUS PRN
Status: DISCONTINUED | OUTPATIENT
Start: 2024-06-14 | End: 2024-06-14

## 2024-06-14 RX ORDER — ONDANSETRON 2 MG/ML
4 INJECTION INTRAMUSCULAR; INTRAVENOUS EVERY 30 MIN PRN
Status: DISCONTINUED | OUTPATIENT
Start: 2024-06-14 | End: 2024-06-14 | Stop reason: HOSPADM

## 2024-06-14 RX ORDER — NALOXONE HYDROCHLORIDE 0.4 MG/ML
0.4 INJECTION, SOLUTION INTRAMUSCULAR; INTRAVENOUS; SUBCUTANEOUS
Status: DISCONTINUED | OUTPATIENT
Start: 2024-06-14 | End: 2024-06-16 | Stop reason: HOSPADM

## 2024-06-14 RX ORDER — BUPIVACAINE HYDROCHLORIDE 2.5 MG/ML
INJECTION, SOLUTION EPIDURAL; INFILTRATION; INTRACAUDAL PRN
Status: DISCONTINUED | OUTPATIENT
Start: 2024-06-14 | End: 2024-06-14 | Stop reason: HOSPADM

## 2024-06-14 RX ORDER — GADOBUTROL 604.72 MG/ML
10 INJECTION INTRAVENOUS ONCE
Status: COMPLETED | OUTPATIENT
Start: 2024-06-14 | End: 2024-06-14

## 2024-06-14 RX ORDER — GLYCOPYRROLATE 0.2 MG/ML
INJECTION, SOLUTION INTRAMUSCULAR; INTRAVENOUS PRN
Status: DISCONTINUED | OUTPATIENT
Start: 2024-06-14 | End: 2024-06-14

## 2024-06-14 RX ORDER — LIDOCAINE HCL/EPINEPHRINE/PF 2%-1:200K
VIAL (ML) INJECTION PRN
Status: DISCONTINUED | OUTPATIENT
Start: 2024-06-14 | End: 2024-06-14 | Stop reason: HOSPADM

## 2024-06-14 RX ORDER — MAGNESIUM HYDROXIDE 1200 MG/15ML
LIQUID ORAL PRN
Status: DISCONTINUED | OUTPATIENT
Start: 2024-06-14 | End: 2024-06-14 | Stop reason: HOSPADM

## 2024-06-14 RX ORDER — SODIUM CHLORIDE, SODIUM LACTATE, POTASSIUM CHLORIDE, CALCIUM CHLORIDE 600; 310; 30; 20 MG/100ML; MG/100ML; MG/100ML; MG/100ML
INJECTION, SOLUTION INTRAVENOUS CONTINUOUS
Status: DISCONTINUED | OUTPATIENT
Start: 2024-06-14 | End: 2024-06-14 | Stop reason: HOSPADM

## 2024-06-14 RX ORDER — FENTANYL CITRATE 0.05 MG/ML
25 INJECTION, SOLUTION INTRAMUSCULAR; INTRAVENOUS EVERY 5 MIN PRN
Status: DISCONTINUED | OUTPATIENT
Start: 2024-06-14 | End: 2024-06-14 | Stop reason: HOSPADM

## 2024-06-14 RX ORDER — NALOXONE HYDROCHLORIDE 0.4 MG/ML
0.2 INJECTION, SOLUTION INTRAMUSCULAR; INTRAVENOUS; SUBCUTANEOUS
Status: DISCONTINUED | OUTPATIENT
Start: 2024-06-14 | End: 2024-06-16 | Stop reason: HOSPADM

## 2024-06-14 RX ORDER — ONDANSETRON 4 MG/1
4 TABLET, ORALLY DISINTEGRATING ORAL EVERY 6 HOURS PRN
Status: DISCONTINUED | OUTPATIENT
Start: 2024-06-14 | End: 2024-06-16 | Stop reason: HOSPADM

## 2024-06-14 RX ORDER — VASOPRESSIN IN 0.9 % NACL 2 UNIT/2ML
SYRINGE (ML) INTRAVENOUS PRN
Status: DISCONTINUED | OUTPATIENT
Start: 2024-06-14 | End: 2024-06-14

## 2024-06-14 RX ORDER — LORAZEPAM 2 MG/ML
1 INJECTION INTRAMUSCULAR ONCE
Status: COMPLETED | OUTPATIENT
Start: 2024-06-14 | End: 2024-06-14

## 2024-06-14 RX ORDER — PROCHLORPERAZINE MALEATE 10 MG
10 TABLET ORAL EVERY 6 HOURS PRN
Status: DISCONTINUED | OUTPATIENT
Start: 2024-06-14 | End: 2024-06-16 | Stop reason: HOSPADM

## 2024-06-14 RX ORDER — HYDROMORPHONE HCL IN WATER/PF 6 MG/30 ML
0.2 PATIENT CONTROLLED ANALGESIA SYRINGE INTRAVENOUS EVERY 5 MIN PRN
Status: DISCONTINUED | OUTPATIENT
Start: 2024-06-14 | End: 2024-06-14 | Stop reason: HOSPADM

## 2024-06-14 RX ORDER — PROPOFOL 10 MG/ML
INJECTION, EMULSION INTRAVENOUS PRN
Status: DISCONTINUED | OUTPATIENT
Start: 2024-06-14 | End: 2024-06-14

## 2024-06-14 RX ORDER — BISACODYL 10 MG
10 SUPPOSITORY, RECTAL RECTAL DAILY PRN
Status: DISCONTINUED | OUTPATIENT
Start: 2024-06-14 | End: 2024-06-16 | Stop reason: HOSPADM

## 2024-06-14 RX ORDER — KETOROLAC TROMETHAMINE 15 MG/ML
15 INJECTION, SOLUTION INTRAMUSCULAR; INTRAVENOUS
Status: COMPLETED | OUTPATIENT
Start: 2024-06-14 | End: 2024-06-15

## 2024-06-14 RX ORDER — LORAZEPAM 2 MG/ML
1 INJECTION INTRAMUSCULAR ONCE
Qty: 1 ML | Refills: 0 | Status: COMPLETED | OUTPATIENT
Start: 2024-06-14 | End: 2024-06-14

## 2024-06-14 RX ORDER — HYDROMORPHONE HCL IN WATER/PF 6 MG/30 ML
0.2 PATIENT CONTROLLED ANALGESIA SYRINGE INTRAVENOUS
Status: DISCONTINUED | OUTPATIENT
Start: 2024-06-14 | End: 2024-06-14

## 2024-06-14 RX ORDER — FENTANYL CITRATE 50 UG/ML
INJECTION, SOLUTION INTRAMUSCULAR; INTRAVENOUS PRN
Status: DISCONTINUED | OUTPATIENT
Start: 2024-06-14 | End: 2024-06-14

## 2024-06-14 RX ORDER — ONDANSETRON 2 MG/ML
4 INJECTION INTRAMUSCULAR; INTRAVENOUS EVERY 6 HOURS PRN
Status: DISCONTINUED | OUTPATIENT
Start: 2024-06-14 | End: 2024-06-16 | Stop reason: HOSPADM

## 2024-06-14 RX ORDER — SODIUM CHLORIDE, SODIUM LACTATE, POTASSIUM CHLORIDE, CALCIUM CHLORIDE 600; 310; 30; 20 MG/100ML; MG/100ML; MG/100ML; MG/100ML
INJECTION, SOLUTION INTRAVENOUS CONTINUOUS
Status: DISCONTINUED | OUTPATIENT
Start: 2024-06-14 | End: 2024-06-15

## 2024-06-14 RX ORDER — DOCUSATE SODIUM 100 MG/1
100 CAPSULE, LIQUID FILLED ORAL 2 TIMES DAILY
Status: DISCONTINUED | OUTPATIENT
Start: 2024-06-14 | End: 2024-06-15

## 2024-06-14 RX ORDER — HYDROMORPHONE HYDROCHLORIDE 1 MG/ML
0.5 INJECTION, SOLUTION INTRAMUSCULAR; INTRAVENOUS; SUBCUTANEOUS ONCE
Status: COMPLETED | OUTPATIENT
Start: 2024-06-14 | End: 2024-06-14

## 2024-06-14 RX ORDER — HYDRALAZINE HYDROCHLORIDE 10 MG/1
10 TABLET, FILM COATED ORAL EVERY 4 HOURS PRN
Status: DISCONTINUED | OUTPATIENT
Start: 2024-06-14 | End: 2024-06-16 | Stop reason: HOSPADM

## 2024-06-14 RX ORDER — SIMETHICONE 80 MG
80 TABLET,CHEWABLE ORAL 4 TIMES DAILY PRN
Status: DISCONTINUED | OUTPATIENT
Start: 2024-06-14 | End: 2024-06-16 | Stop reason: HOSPADM

## 2024-06-14 RX ORDER — FENTANYL CITRATE 0.05 MG/ML
50 INJECTION, SOLUTION INTRAMUSCULAR; INTRAVENOUS EVERY 5 MIN PRN
Status: DISCONTINUED | OUTPATIENT
Start: 2024-06-14 | End: 2024-06-14 | Stop reason: HOSPADM

## 2024-06-14 RX ORDER — HYDROMORPHONE HCL IN WATER/PF 6 MG/30 ML
0.4 PATIENT CONTROLLED ANALGESIA SYRINGE INTRAVENOUS
Status: DISCONTINUED | OUTPATIENT
Start: 2024-06-14 | End: 2024-06-14 | Stop reason: DRUGHIGH

## 2024-06-14 RX ORDER — GUAIFENESIN 200 MG/10ML
200 LIQUID ORAL EVERY 4 HOURS PRN
Status: DISCONTINUED | OUTPATIENT
Start: 2024-06-14 | End: 2024-06-16 | Stop reason: HOSPADM

## 2024-06-14 RX ORDER — HYDROMORPHONE HYDROCHLORIDE 1 MG/ML
.3-.5 INJECTION, SOLUTION INTRAMUSCULAR; INTRAVENOUS; SUBCUTANEOUS
Status: COMPLETED | OUTPATIENT
Start: 2024-06-14 | End: 2024-06-15

## 2024-06-14 RX ORDER — LIDOCAINE HYDROCHLORIDE 20 MG/ML
INJECTION, SOLUTION INFILTRATION; PERINEURAL PRN
Status: DISCONTINUED | OUTPATIENT
Start: 2024-06-14 | End: 2024-06-14

## 2024-06-14 RX ORDER — OXYCODONE HYDROCHLORIDE 5 MG/1
5 TABLET ORAL EVERY 4 HOURS PRN
Status: DISCONTINUED | OUTPATIENT
Start: 2024-06-14 | End: 2024-06-16 | Stop reason: HOSPADM

## 2024-06-14 RX ORDER — IOPAMIDOL 755 MG/ML
90 INJECTION, SOLUTION INTRAVASCULAR ONCE
Status: COMPLETED | OUTPATIENT
Start: 2024-06-14 | End: 2024-06-14

## 2024-06-14 RX ORDER — HYDRALAZINE HYDROCHLORIDE 20 MG/ML
10 INJECTION INTRAMUSCULAR; INTRAVENOUS EVERY 4 HOURS PRN
Status: DISCONTINUED | OUTPATIENT
Start: 2024-06-14 | End: 2024-06-16 | Stop reason: HOSPADM

## 2024-06-14 RX ORDER — HYDROMORPHONE HYDROCHLORIDE 1 MG/ML
INJECTION, SOLUTION INTRAMUSCULAR; INTRAVENOUS; SUBCUTANEOUS PRN
Status: DISCONTINUED | OUTPATIENT
Start: 2024-06-14 | End: 2024-06-14

## 2024-06-14 RX ORDER — MEROPENEM 1 G/1
1 INJECTION, POWDER, FOR SOLUTION INTRAVENOUS EVERY 8 HOURS
Status: DISCONTINUED | OUTPATIENT
Start: 2024-06-14 | End: 2024-06-16 | Stop reason: HOSPADM

## 2024-06-14 RX ORDER — METHADONE HYDROCHLORIDE 10 MG/ML
10 INJECTION, SOLUTION INTRAMUSCULAR; INTRAVENOUS; SUBCUTANEOUS ONCE
Qty: 1 ML | Refills: 0 | Status: COMPLETED | OUTPATIENT
Start: 2024-06-14 | End: 2024-06-14

## 2024-06-14 RX ORDER — LANOLIN ALCOHOL/MO/W.PET/CERES
3 CREAM (GRAM) TOPICAL
Status: DISCONTINUED | OUTPATIENT
Start: 2024-06-14 | End: 2024-06-16 | Stop reason: HOSPADM

## 2024-06-14 RX ORDER — CALCIUM CARBONATE 500 MG/1
1000 TABLET, CHEWABLE ORAL 4 TIMES DAILY PRN
Status: DISCONTINUED | OUTPATIENT
Start: 2024-06-14 | End: 2024-06-16 | Stop reason: HOSPADM

## 2024-06-14 RX ORDER — SODIUM CHLORIDE 9 MG/ML
INJECTION, SOLUTION INTRAVENOUS CONTINUOUS
Status: DISCONTINUED | OUTPATIENT
Start: 2024-06-14 | End: 2024-06-16 | Stop reason: HOSPADM

## 2024-06-14 RX ORDER — ONDANSETRON 2 MG/ML
INJECTION INTRAMUSCULAR; INTRAVENOUS PRN
Status: DISCONTINUED | OUTPATIENT
Start: 2024-06-14 | End: 2024-06-14

## 2024-06-14 RX ADMIN — SODIUM CHLORIDE, POTASSIUM CHLORIDE, SODIUM LACTATE AND CALCIUM CHLORIDE: 600; 310; 30; 20 INJECTION, SOLUTION INTRAVENOUS at 17:50

## 2024-06-14 RX ADMIN — DEXMEDETOMIDINE HYDROCHLORIDE 12 MCG: 100 INJECTION, SOLUTION INTRAVENOUS at 18:01

## 2024-06-14 RX ADMIN — Medication 3 ML: at 16:37

## 2024-06-14 RX ADMIN — ROCURONIUM BROMIDE 10 MG: 50 INJECTION, SOLUTION INTRAVENOUS at 19:36

## 2024-06-14 RX ADMIN — PROPOFOL 30 MG: 10 INJECTION, EMULSION INTRAVENOUS at 20:09

## 2024-06-14 RX ADMIN — GLYCOPYRROLATE 0.2 MG: 0.2 INJECTION, SOLUTION INTRAMUSCULAR; INTRAVENOUS at 17:15

## 2024-06-14 RX ADMIN — SODIUM CHLORIDE, PRESERVATIVE FREE: 5 INJECTION INTRAVENOUS at 04:51

## 2024-06-14 RX ADMIN — MEROPENEM 1 G: 1 INJECTION, POWDER, FOR SOLUTION INTRAVENOUS at 10:45

## 2024-06-14 RX ADMIN — PHENYLEPHRINE HYDROCHLORIDE 200 MCG: 10 INJECTION INTRAVENOUS at 18:41

## 2024-06-14 RX ADMIN — LORAZEPAM 1 MG: 2 INJECTION INTRAMUSCULAR; INTRAVENOUS at 10:45

## 2024-06-14 RX ADMIN — ONDANSETRON 4 MG: 2 INJECTION INTRAMUSCULAR; INTRAVENOUS at 20:02

## 2024-06-14 RX ADMIN — MEROPENEM 1 G: 1 INJECTION, POWDER, FOR SOLUTION INTRAVENOUS at 18:30

## 2024-06-14 RX ADMIN — Medication 1 UNITS: at 19:12

## 2024-06-14 RX ADMIN — DOCUSATE SODIUM 100 MG: 100 CAPSULE, LIQUID FILLED ORAL at 07:40

## 2024-06-14 RX ADMIN — HYDROMORPHONE HYDROCHLORIDE 0.5 MG: 1 INJECTION, SOLUTION INTRAMUSCULAR; INTRAVENOUS; SUBCUTANEOUS at 08:51

## 2024-06-14 RX ADMIN — ALBUMIN (HUMAN): 12.5 SOLUTION INTRAVENOUS at 19:29

## 2024-06-14 RX ADMIN — ROCURONIUM BROMIDE 50 MG: 50 INJECTION, SOLUTION INTRAVENOUS at 17:42

## 2024-06-14 RX ADMIN — SUGAMMADEX 200 MG: 100 INJECTION, SOLUTION INTRAVENOUS at 20:06

## 2024-06-14 RX ADMIN — LIDOCAINE HYDROCHLORIDE 2.5 ML: 20 INJECTION, SOLUTION INFILTRATION; PERINEURAL at 16:24

## 2024-06-14 RX ADMIN — PHENYLEPHRINE HYDROCHLORIDE 150 MCG: 10 INJECTION INTRAVENOUS at 18:37

## 2024-06-14 RX ADMIN — GADOBUTROL 10 ML: 604.72 INJECTION INTRAVENOUS at 03:37

## 2024-06-14 RX ADMIN — HYDROMORPHONE HYDROCHLORIDE 0.5 MG: 1 INJECTION, SOLUTION INTRAMUSCULAR; INTRAVENOUS; SUBCUTANEOUS at 02:19

## 2024-06-14 RX ADMIN — PROPOFOL 200 MG: 10 INJECTION, EMULSION INTRAVENOUS at 17:32

## 2024-06-14 RX ADMIN — PHENYLEPHRINE HYDROCHLORIDE 100 MCG: 10 INJECTION INTRAVENOUS at 18:48

## 2024-06-14 RX ADMIN — Medication 2 UNITS: at 19:19

## 2024-06-14 RX ADMIN — HYDROMORPHONE HYDROCHLORIDE 0.5 MG: 1 INJECTION, SOLUTION INTRAMUSCULAR; INTRAVENOUS; SUBCUTANEOUS at 01:08

## 2024-06-14 RX ADMIN — FENTANYL CITRATE 100 MCG: 50 INJECTION INTRAMUSCULAR; INTRAVENOUS at 17:41

## 2024-06-14 RX ADMIN — ROCURONIUM BROMIDE 20 MG: 50 INJECTION, SOLUTION INTRAVENOUS at 19:03

## 2024-06-14 RX ADMIN — Medication 5 ML: at 16:30

## 2024-06-14 RX ADMIN — ROCURONIUM BROMIDE 30 MG: 50 INJECTION, SOLUTION INTRAVENOUS at 18:22

## 2024-06-14 RX ADMIN — METHADONE HYDROCHLORIDE 10 MG: 10 INJECTION, SOLUTION INTRAMUSCULAR; INTRAVENOUS; SUBCUTANEOUS at 19:49

## 2024-06-14 RX ADMIN — SODIUM CHLORIDE, POTASSIUM CHLORIDE, SODIUM LACTATE AND CALCIUM CHLORIDE: 600; 310; 30; 20 INJECTION, SOLUTION INTRAVENOUS at 16:49

## 2024-06-14 RX ADMIN — FENTANYL CITRATE 50 MCG: 50 INJECTION, SOLUTION INTRAMUSCULAR; INTRAVENOUS at 21:13

## 2024-06-14 RX ADMIN — ALBUMIN (HUMAN): 12.5 SOLUTION INTRAVENOUS at 18:59

## 2024-06-14 RX ADMIN — PHENYLEPHRINE HYDROCHLORIDE 100 MCG: 10 INJECTION INTRAVENOUS at 18:45

## 2024-06-14 RX ADMIN — HYDROMORPHONE HYDROCHLORIDE 0.2 MG: 0.2 INJECTION, SOLUTION INTRAMUSCULAR; INTRAVENOUS; SUBCUTANEOUS at 22:23

## 2024-06-14 RX ADMIN — Medication 1 UNITS: at 19:10

## 2024-06-14 RX ADMIN — PHENYLEPHRINE HYDROCHLORIDE 100 MCG: 10 INJECTION INTRAVENOUS at 19:34

## 2024-06-14 RX ADMIN — MIDAZOLAM 0.5 MG: 1 INJECTION INTRAMUSCULAR; INTRAVENOUS at 17:26

## 2024-06-14 RX ADMIN — ONDANSETRON 4 MG: 2 INJECTION INTRAMUSCULAR; INTRAVENOUS at 16:55

## 2024-06-14 RX ADMIN — GLYCOPYRROLATE 0.2 MG: 0.2 INJECTION, SOLUTION INTRAMUSCULAR; INTRAVENOUS at 17:20

## 2024-06-14 RX ADMIN — LIDOCAINE HYDROCHLORIDE 2.5 ML: 20 INJECTION, SOLUTION INFILTRATION; PERINEURAL at 16:27

## 2024-06-14 RX ADMIN — FENTANYL CITRATE 50 MCG: 50 INJECTION INTRAMUSCULAR; INTRAVENOUS at 20:09

## 2024-06-14 RX ADMIN — Medication 1 UNITS: at 19:15

## 2024-06-14 RX ADMIN — MIDAZOLAM 0.5 MG: 1 INJECTION INTRAMUSCULAR; INTRAVENOUS at 17:27

## 2024-06-14 RX ADMIN — PHENYLEPHRINE HYDROCHLORIDE 150 MCG: 10 INJECTION INTRAVENOUS at 18:33

## 2024-06-14 RX ADMIN — SODIUM CHLORIDE, POTASSIUM CHLORIDE, SODIUM LACTATE AND CALCIUM CHLORIDE: 600; 310; 30; 20 INJECTION, SOLUTION INTRAVENOUS at 19:24

## 2024-06-14 RX ADMIN — DEXMEDETOMIDINE HYDROCHLORIDE 8 MCG: 100 INJECTION, SOLUTION INTRAVENOUS at 17:28

## 2024-06-14 RX ADMIN — PHENYLEPHRINE HYDROCHLORIDE 150 MCG: 10 INJECTION INTRAVENOUS at 18:57

## 2024-06-14 RX ADMIN — Medication 1 UNITS: at 19:01

## 2024-06-14 RX ADMIN — MIDAZOLAM 0.5 MG: 1 INJECTION INTRAMUSCULAR; INTRAVENOUS at 17:23

## 2024-06-14 RX ADMIN — PHENYLEPHRINE HYDROCHLORIDE 300 MCG: 10 INJECTION INTRAVENOUS at 18:53

## 2024-06-14 RX ADMIN — HYDROMORPHONE HYDROCHLORIDE 0.5 MG: 1 INJECTION, SOLUTION INTRAMUSCULAR; INTRAVENOUS; SUBCUTANEOUS at 12:37

## 2024-06-14 RX ADMIN — IOPAMIDOL 90 ML: 755 INJECTION, SOLUTION INTRAVENOUS at 13:53

## 2024-06-14 RX ADMIN — HYDROMORPHONE HYDROCHLORIDE 0.4 MG: 0.2 INJECTION, SOLUTION INTRAMUSCULAR; INTRAVENOUS; SUBCUTANEOUS at 04:19

## 2024-06-14 RX ADMIN — Medication 2 UNITS: at 19:28

## 2024-06-14 RX ADMIN — PHENYLEPHRINE HYDROCHLORIDE 100 MCG: 10 INJECTION INTRAVENOUS at 19:15

## 2024-06-14 RX ADMIN — SODIUM CHLORIDE 60 ML: 9 INJECTION, SOLUTION INTRAVENOUS at 13:53

## 2024-06-14 RX ADMIN — HYDROMORPHONE HYDROCHLORIDE 0.4 MG: 0.2 INJECTION, SOLUTION INTRAMUSCULAR; INTRAVENOUS; SUBCUTANEOUS at 07:37

## 2024-06-14 RX ADMIN — HYDROMORPHONE HYDROCHLORIDE 0.5 MG: 1 INJECTION, SOLUTION INTRAMUSCULAR; INTRAVENOUS; SUBCUTANEOUS at 13:47

## 2024-06-14 RX ADMIN — HYDROMORPHONE HYDROCHLORIDE 0.2 MG: 1 INJECTION, SOLUTION INTRAMUSCULAR; INTRAVENOUS; SUBCUTANEOUS at 18:14

## 2024-06-14 RX ADMIN — SUCCINYLCHOLINE CHLORIDE 120 MG: 20 INJECTION, SOLUTION INTRAMUSCULAR; INTRAVENOUS; PARENTERAL at 17:32

## 2024-06-14 RX ADMIN — Medication 1 UNITS: at 19:03

## 2024-06-14 RX ADMIN — LORAZEPAM 1 MG: 2 INJECTION INTRAMUSCULAR; INTRAVENOUS at 02:22

## 2024-06-14 RX ADMIN — MIDAZOLAM 0.5 MG: 1 INJECTION INTRAMUSCULAR; INTRAVENOUS at 17:24

## 2024-06-14 RX ADMIN — PHENYLEPHRINE HYDROCHLORIDE 0.5 MCG/KG/MIN: 10 INJECTION INTRAVENOUS at 19:18

## 2024-06-14 RX ADMIN — DEXAMETHASONE SODIUM PHOSPHATE 10 MG: 4 INJECTION, SOLUTION INTRA-ARTICULAR; INTRALESIONAL; INTRAMUSCULAR; INTRAVENOUS; SOFT TISSUE at 17:44

## 2024-06-14 RX ADMIN — Medication 4 ML: at 17:23

## 2024-06-14 ASSESSMENT — ACTIVITIES OF DAILY LIVING (ADL)
ADLS_ACUITY_SCORE: 28
ADLS_ACUITY_SCORE: 22
ADLS_ACUITY_SCORE: 28
ADLS_ACUITY_SCORE: 35
ADLS_ACUITY_SCORE: 28
ADLS_ACUITY_SCORE: 35
ADLS_ACUITY_SCORE: 22
ADLS_ACUITY_SCORE: 28
ADLS_ACUITY_SCORE: 22
ADLS_ACUITY_SCORE: 28
ADLS_ACUITY_SCORE: 28
ADLS_ACUITY_SCORE: 35
ADLS_ACUITY_SCORE: 22
ADLS_ACUITY_SCORE: 22
ADLS_ACUITY_SCORE: 28
ADLS_ACUITY_SCORE: 35
ADLS_ACUITY_SCORE: 22
ADLS_ACUITY_SCORE: 28
ADLS_ACUITY_SCORE: 35
ADLS_ACUITY_SCORE: 28
ADLS_ACUITY_SCORE: 22

## 2024-06-14 NOTE — ED NOTES
[facility]  ED Nurse Handoff Report    ED Chief complaint: Abdominal Pain      ED Diagnosis:   Final diagnoses:   None       Code Status: to be addressed    Allergies:   Allergies   Allergen Reactions    Rocephin [Ceftriaxone Sodium] Anaphylaxis    Latex Hives    Penicillins Hives    Wellbutrin [Bupropion]      Irritability       Patient Story: presented in ED due to right lower quadrant pain 4 hours ago,constipation a week ago.  Focused Assessment:  not distress,elevated BP,alert with severe discomfort and pain.    Treatments and/or interventions provided: iv access but with difficulty accessing,labs,ultrasound,see MAR.  Patient's response to treatments and/or interventions: tolerated    To be done/followed up on inpatient unit:  as per admission order    Does this patient have any cognitive concerns?:  none    Activity level - Baseline/Home:  Independent  Activity Level - Current:   Independent    Patient's Preferred language: English   Needed?: No    Isolation: None  Infection: Not Applicable  Patient tested for COVID 19 prior to admission: NO  Bariatric?: Yes    Vital Signs:   Vitals:    06/13/24 1908 06/13/24 1909   BP:  (!) 178/106   Pulse: 67    Resp: 20    Temp: 97.1  F (36.2  C)    TempSrc: Temporal    SpO2: 100%    Weight: 131.5 kg (290 lb)        Cardiac Rhythm:     Was the PSS-3 completed:   Yes  What interventions are required if any?               Family Comments: updated of plan  OBS brochure/video discussed/provided to patient/family: No              Name of person given brochure if not patient:               Relationship to patient:     For the majority of the shift this patient's behavior was Green.   Behavioral interventions performed were none.    ED NURSE PHONE NUMBER: [phone]

## 2024-06-14 NOTE — H&P
[facility]    History and Physical - Hospitalist Service       Date of Admission:  6/13/2024    Assessment & Plan      Erica Gordon is a 33 year old female admitted on 6/13/2024.     Abdominal pain, large indeterminant complex cystic lesion in the anterior right abdomen: Erica Gordon is a 33 year old female who presents to the emergency department with abdominal pain.  Predominantly right lower quadrant.  Patient with progressive worsening abdominal pain this evening prompting her to be evaluated emergency department.  Patient primarily locating pain in the right lower quadrant with radiation to the right groin and right leg.  Patient describes pain as sharp and intermittently exacerbated.  Patient endorses difficulty with urination.  Endorses constipation.  Has not had regular bowel movement in approximately 1 week.  Patient denies chest pain, headache, vomiting.  In the emergency department patient with a creatinine of 1.28.  White blood cell count is 15.6.  Urinalysis shows large leukocyte Estrace.  Urine culture pending.  Ultrasound of the pelvis pending at time of admission.  Renal ultrasound shows normal appearance of the right kidney, large indeterminate complex cystic lesion in the anterior right abdomen, further evaluation with a contrast-enhanced CT study recommended, see report for further details.  CT of the abdomen and pelvis with contrast is pending at time of admission.  Plan:  -CT abdomen pending on admission, resulted, see results.  -Gyn Oncology consult.  -  -CEA level, AFP, inhibin, ldh.   -MRI.   -Surgery in am.     Anxiety, depression: Continue prior to admission fluoxetine when verified.    Reactive airway disease: Continue prior to admission albuterol and verified.            Diet:  N.p.o. at midnight  DVT Prophylaxis: Pneumatic Compression Devices  [place]ora Catheter: Not present  Lines: None     Cardiac Monitoring: None  Code Status:  Full  code    Clinically Significant Risk Factors Present on Admission                                  # Asthma: noted on problem list        Disposition Plan     Medically Ready for Discharge: Anticipated in 2-4 Days           Kyaw Chu DO  Hospitalist [facility]  Securely message with [employer] (more info)  Text page via [employer] Paging/Directory     ______________________________________________________________________    Chief Complaint   Abdominal pain    History is obtained from the patient    History of Present Illness   Erica Gordon is a 33 year old female who presents to the emergency department with abdominal pain.  Predominantly right lower quadrant.  Patient with progressive worsening abdominal pain this evening prompting her to be evaluated emergency department.  Patient primarily locating pain in the right lower quadrant with radiation to the right groin and right leg.  Patient describes pain as sharp and intermittently exacerbated.  Patient endorses difficulty with urination.  Endorses constipation.  Has not had regular bowel movement in approximately 1 week.  Patient denies chest pain, headache, vomiting.  In the emergency department patient with a creatinine of 1.28.  White blood cell count is 15.6.  Urinalysis shows large leukocyte Estrace.  Urine culture pending.  Ultrasound of the pelvis pending at time of admission.  Renal ultrasound shows normal appearance of the right kidney, large indeterminate complex cystic lesion in the anterior right abdomen, further evaluation with a contrast-enhanced CT study recommended, see report for further details.  CT of the abdomen and pelvis with contrast is pending at time of admission.      Past Medical History    Past Medical History:   Diagnosis Date    Depression     Fibromyalgia     Mild intermittent asthma     Obesity (BMI 30-39.9)        Past Surgical History   Past Surgical History:   Procedure Laterality Date     NO HISTORY OF SURGERY         Prior to Admission Medications   Prior to Admission Medications   Prescriptions Last Dose Informant Patient Reported? Taking?   FLUoxetine (PROZAC) 20 MG capsule   No No   Sig: Take 1 capsule (20 mg) by mouth daily   albuterol (VENTOLIN HFA) 108 (90 Base) MCG/ACT inhaler   No No   Sig: Inhale 2 puffs into the lungs every 6 hours   levonorgestrel (MIRENA) 20 MCG/24HR IUD   No No   Si each (20 mcg) by Intrauterine route continuous      Facility-Administered Medications: None           Physical Exam   Vital Signs: Temp: 97.1  F (36.2  C) Temp src: Temporal BP: (!) 163/90 Pulse: 56   Resp: 14 SpO2: 97 % O2 Device: None (Room air)    Weight: 290 lbs 0 oz    GENERAL: Alert and oriented. NAD. Conversational, appropriate.   HEENT: Normocephalic. EOMI. No icterus or injection. Nares normal.   LUNGS: Clear to auscultation. No dyspnea at rest.   HEART: Regular rate. Extremities perfused.   ABDOMEN: Protuberant, tender, and distended.  Hypoactive bowel sounds.   NEUROLOGIC: Moves extremities x4 on command. No acute focal neurologic abnormalities noted.         Medical Decision Making       47 MINUTES SPENT BY ME on the date of service doing chart review, history, exam, documentation & further activities per the note.      Data     I have personally reviewed the following data over the past 24 hrs:    15.6 (H)  \   14.3   / 405     137 102 19.4 /  117 (H)   4.5 21 (L) 1.28 (H) \       Imaging results reviewed over the past 24 hrs:   Recent Results (from the past 24 hour(s))   US Renal Limited    Narrative    EXAM: US RENAL LIMITED  LOCATION: [facility]  DATE: 2024    INDICATION: acute onset RLQ abdominal pain  COMPARISON: None.  TECHNIQUE: Routine Bilateral Renal and Bladder Ultrasound.    FINDINGS:    RIGHT KIDNEY: 11.9 x 4.9 x 5.9 cm. The estimated cortical thickness is 1.6 cm. Normal without hydronephrosis or solid renal mass.     OTHER: There is a large complex  multiloculated cystic lesion within the anterior mid right abdomen measuring approximately 25 x 23 x 15 cm. This has no discrete internal vascular flow however internal solid components versus thick internal septations. It   is indeterminate.      Impression    IMPRESSION:  1.  Normal appearance of the right kidney.  2.  Large indeterminant complex cystic lesion in the anterior right abdomen. Further evaluation with a contrast-enhanced CT study is recommended.    __    NOTE: ABNORMAL REPORT    THE DICTATION ABOVE DESCRIBES AN ABNORMALITY FOR WHICH FOLLOW-UP IS NEEDED.

## 2024-06-14 NOTE — ED NOTES
Confirmed patient known to have high BP but not on treatment,no headache,moving all her extremities.

## 2024-06-14 NOTE — PROGRESS NOTES
"[facility]    Hospitalist Interim Progress Note     See Dr. JUAN PABLO Chu's admission note from earlier this morning.    Since that time discussed with patient,  3 para 3, she is aware of surgical plans that would include .  She states she has not been seen by her gynecologist for years since her last child was born.  Reports onset of intermittent abdominal pain over the last 2 weeks increased yesterday.  No nausea, emesis.  Reports no BM x 2 weeks, abdominal pain as above, no nausea, emesis.  In addition she has garbled articulation and states that she has \"dental problems with holes in my 2 [left lower] molar and significant pain on left-sided neck'.  Exam indicates significant lymphadenopathy entire left neck parotid, submandibular, tender to touch, limited ROM.  Can only open mouth partially.      Discussed with GYN oncology, Dr Funes above, planned exploratory laparotomy later today. WBC 15.6.  Due to high concerns of infection/sepsis related to dental possible abscess patient started on IV antibiotics preop.  Patient with significant cephalosporin and PCN allergies [reported anaphylaxis, and hive reaction ]GYN oncology to discuss with Pharm.D or ID if needed re: abx.  Although she has a history of asthma, no stridor or wheeze on exam.  Also discussed with GYN oncology history of constipation x 2 weeks with likely stool burden.  See GYN note 2024.    MD Terry  Internal Medicine  [facility] .  "

## 2024-06-14 NOTE — PLAN OF CARE
Goal Outcome Evaluation:       Date & Time: 6/14/2024 6908-7565  Diagnosis: Large complex pelvic mass   Behavior & Aggression: Green  Fall Risk: No  Orientation: AOx4  ABNL VS/O2: VSS on RA  ABNL Labs: K 5.0, Mag 2.1, Phos 3.5, rechecks tomorrow AM.  Pain Management: Severe lower abdominal pain managed with PRN IV dilaudid 0.5mg Q1hr.  Right jaw/tooth pain - MD ordered Neck CT for concern of infection/abscess.   Bowel/Bladder: Continent b/b, voiding in BR. No BM x 2 weeks per pt report - MD aware.  Lines/Drains: R upper forearm PIV infusing LR @ 100ml/hr.  Wounds/incisions: N/A  Diet: NPO ex meds  Activity Level: Ind  Tests/Procedures: Neck CT completed, result pending. Plan for OR later this afternoon for diagnostic laparoscopy, unilateral salpingo-oophorectomy, possible exploratory laparotomy, possible cancer staging. Dentist Consult placed.  Anticipated  DC Date: TBD   Significant Information: Unable to swallow PO medications d/t the jaw/neck/tooth pain.

## 2024-06-14 NOTE — ANESTHESIA PROCEDURE NOTES
Airway         Procedure Start/Stop Times: 6/14/2024 5:34 PM  Staff -        Anesthesiologist:  Amna Navarro MD       CRNA: Christi Hernandez APRN CRNA       Performed By: CRNAIndications and Patient Condition       Indications for airway management: esther-procedural       Induction type:RSI       Mask difficulty assessment: 0 - not attempted    Final Airway Details       Final airway type: endotracheal airway       Successful airway: ETT - single  Endotracheal Airway Details        ETT size (mm): 6.5       Cuffed: yes       Successful intubation technique: video laryngoscopy       VL Blade Size: Glidescope 3       Grade View of Cords: 1       Adjucts: stylet       Position: Left       Measured from: lips       Secured at (cm): 22       Bite block used: None    Post intubation assessment        Placement verified by: capnometry, equal breath sounds and chest rise        Number of attempts at approach: 1       Number of other approaches attempted: 0       Secured with: tape       Ease of procedure: easy       Dentition: Intact and Unchanged    Medication(s) Administered   Medication Administration Time: 6/14/2024 5:34 PM

## 2024-06-14 NOTE — PROGRESS NOTES
OMS Progress Note/Nursing update    A/P: Erica Gordon is a 33 year old year old female s/p incision and drainage of right submandibular space abscess and extraction of teeth #31 and 32. Placement of penrose drain right neck incision.   1. Recommend continuing antibiotics and narrow spectrum as indicated with cultures.  2. Continue rescheduled medications  3. Mechanical soft diet from OMS perspective, diet per primary team  4. Anticipate drain removal sunday  5. Apply gauze and firm bite pressure to aid with hemostasis  6. OMS will follow while in house  7. Page OMS service as needed    Ezekiel Mcfarland DDS  [facility]  [address], [address].  [place] [address]  Clinic/On call [phone]  Clinic Fax [phone]

## 2024-06-14 NOTE — PLAN OF CARE
Goal Outcome Evaluation:      Plan of Care Reviewed With: patient    Overall Patient Progress: improvingOverall Patient Progress: improving         Date & Time: 3230-0052  6/14/2024  Diagnosis: Large complex pelvic mass   Behavior & Aggression: Green  Fall Risk: No  Orientation:AOx4  ABNL VS/O2: VSS on RA  ABNL Labs: Cr 1.28  Pain Management: Dilaudid x 1 at ED  Bowel/Bladder: No BM for about a week.   Drains: N/A  Wounds/incisionsL N/A  Diet: NPO  Activity Level: Ind  Tests/Procedures: Laparoscopy pending on OR availability.   Anticipated  DC Date: pending   Significant Information: Pain 10/10, fairly control by medication. Gyn Onc follow up.

## 2024-06-14 NOTE — ED PROVIDER NOTES
Emergency Department Attending Supervision Note  6/4/2024  7:38 AM      I evaluated this patient in conjunction with IRWIN Baum CNP      Briefly, the patient presented with right lower quadrant area abdominal pain that started about 4 hours prior to presentation.  Some nausea now but no vomiting, she has not had a bowel movement in a week.  She has not had fevers or chills.  No urinary burning or frequency but said that it has been a little difficult urinating recently.  No blood in the urine.  No history of kidney stones.  She has had no vaginal discharge.  She still has her ovaries and her appendix.  No right upper quadrant pain.      On my exam, patient appears uncomfortable.  She has a large panniculus, tenderness is in the right lower quadrant and a little bit in the suprapubic area.  No rebound or guarding.  No flank pain.      ED Course: Labs are obtained and her white count is 15.6, she has some mild renal failure with a GFR 56 which is new.  Urine had a negative nitrite, large leukocyte Esterace white cells 4 red cells a few bacteria and squamous epithelial cells.  Probably a little contaminated, the patient is quite large and difficult to clean off.  Cultures pending.  She is sent down for ultrasound of the kidney and ureters to rule out kidney stone and also of the ovary because of the abrupt onset of this pain to rule out torsion.  The ovary looked okay, no evidence of hydronephrosis suggesting kidney stone, but there is a large 25 cm complex cystic type mass in her lower abdomen and pelvis.  CT now is ordered.  David will follow-up the results along with my partner Dr. Casey.  Will continue to give Dilaudid her pain medication and will likely be admitted with GYN or general surgery consultation based upon CT results.      I agree with David's impression and plan.      Diagnosis  Abdominal/pelvic mass  Abdominal pain.      MD Vik Reed, Keyla Barnett MD  06/13/24 0705

## 2024-06-14 NOTE — ANESTHESIA PREPROCEDURE EVALUATION
Anesthesia Pre-Procedure Evaluation    Patient: Erica Gordon   MRN: [id number] : 1990        Procedure : Procedure(s):  LAPAROSCOPY  SALPINGO-OOPHORECTOMY, LAPAROSCOPIC  LAPAROTOMY, EXPLORATORY, FOR STAGING  Incision and drainage oral of right mandibular abscess  Extraction(s) dental of tooth # 31 and 32          Past Medical History:   Diagnosis Date    Depression     Fibromyalgia     Mild intermittent asthma     Obesity (BMI 30-39.9)       Past Surgical History:   Procedure Laterality Date    NO HISTORY OF SURGERY        Allergies   Allergen Reactions    Rocephin [Ceftriaxone Sodium] Anaphylaxis    Latex Hives    Penicillins Hives    Wellbutrin [Bupropion]      Irritability      Social History     Tobacco Use    Smoking status: Former     Current packs/day: 0.00     Average packs/day: 0.5 packs/day for 12.0 years (6.0 ttl pk-yrs)     Types: Cigarettes     Start date: 10/26/2006     Quit date: 10/26/2018     Years since quittin.6    Smokeless tobacco: Never   Substance Use Topics    Alcohol use: No     Alcohol/week: 0.0 standard drinks of alcohol      Wt Readings from Last 1 Encounters:   24 131.5 kg (290 lb)        Anesthesia Evaluation   Pt has had prior anesthetic.     No history of anesthetic complications       ROS/MED HX  ENT/Pulmonary: Comment: Submandibular abscess with significant swelling of the airway tongue    (+)     JARRELL risk factors, snores loudly,  obese,              asthma                  Neurologic:       Cardiovascular:       METS/Exercise Tolerance:     Hematologic:       Musculoskeletal:       GI/Hepatic: Comment: Right ovarian mass, possible torsion      Renal/Genitourinary:       Endo:     (+)               Obesity,       Psychiatric/Substance Use: Comment: Fibromyalgia    (+) psychiatric history anxiety and depression       Infectious Disease:       Malignancy:       Other:            Physical Exam    Airway        Mallampati: III   TM distance: > 3 FB   Neck ROM:  limited   Mouth opening: < 3 cm    Respiratory Devices and Support         Dental       (+) Multiple visibly decayed, broken teeth      Cardiovascular          Rhythm and rate: regular and normal     Pulmonary           breath sounds clear to auscultation           OUTSIDE LABS:  CBC:   Lab Results   Component Value Date    WBC 14.2 (H) 06/14/2024    WBC 15.6 (H) 06/13/2024    HGB 13.6 06/14/2024    HGB 14.3 06/13/2024    HCT 41.6 06/14/2024    HCT 42.7 06/13/2024     06/14/2024     06/13/2024     BMP:   Lab Results   Component Value Date     06/14/2024     06/13/2024    POTASSIUM 5.0 06/14/2024    POTASSIUM 4.5 06/13/2024    CHLORIDE 103 06/14/2024    CHLORIDE 102 06/13/2024    CO2 20 (L) 06/14/2024    CO2 21 (L) 06/13/2024    BUN 15.3 06/14/2024    BUN 19.4 06/13/2024    CR 0.81 06/14/2024    CR 1.28 (H) 06/13/2024    GLC 83 06/14/2024     (H) 06/13/2024     COAGS:   Lab Results   Component Value Date    INR 1.0 05/11/2015     POC:   Lab Results   Component Value Date    HCG Negative 03/29/2013    HCGS Negative 06/13/2024     HEPATIC:   Lab Results   Component Value Date    ALBUMIN 4.0 06/14/2024    PROTTOTAL 7.2 06/14/2024    ALT 14 06/14/2024    AST 21 06/14/2024    ALKPHOS 74 06/14/2024    BILITOTAL 0.6 06/14/2024     OTHER:   Lab Results   Component Value Date    LACT 1.0 06/14/2024    JUAN 9.8 06/14/2024    PHOS 3.5 06/14/2024    MAG 2.1 06/14/2024    AMYLASE 31 06/30/2008    CRP 7.3 03/05/2019    SED 9 02/02/2022       Anesthesia Plan    ASA Status:  3    NPO Status:  NPO Appropriate    Anesthesia Type: General.     - Airway: ETT         Techniques and Equipment:     - Airway: Awake FOI/VL, Video-Laryngoscope       Consents    Anesthesia Plan(s) and associated risks, benefits, and realistic alternatives discussed. Questions answered and patient/representative(s) expressed understanding.     - Discussed: Risks, Benefits and Alternatives for BOTH SEDATION and the PROCEDURE were  "discussed     - Discussed with:  Patient            Postoperative Care            Comments:    Other Comments: Patient unable to open mouth wide. Plan for awake fiberoptic with OMFS on standby for emergent cricothyroidotomy. Lidocaine nebs and spray to localize the airway.           Amna Navarro MD    I have reviewed the pertinent notes and labs in the chart from the past 30 days and (re)examined the patient.  Any updates or changes from those notes are reflected in this note.              # Severe Obesity: Estimated body mass index is 45.42 kg/m  as calculated from the following:    Height as of this encounter: 1.702 m (5' 7\").    Weight as of this encounter: 131.5 kg (290 lb).      "

## 2024-06-14 NOTE — CONSULTS
OMS CONSULTATION NOTE    ASSESSMENT : Erica Gordon is a 33 year old female with leukocytosis, torsion of ovarian mass, and right submandibular space abscess associated with teeth #31 and 32    PLAN/RECOMMENDATIONS:   Operative intervention for incision and drainage of right submandibular space abscess and carious teeth #31, 32  2. Please keep NPO  3. Will obtain cultures and sensitivities    Ezekiel Mcfarland DDS, MD  [facility]  Cell phone- [phone]  If questions, please call  and ask to be connected to Oral and Maxillofacial Surgery Surgeon on call    CC:  I am having so much pain. Both my mouth and belly hurts so much  HPI: Erica Gordon is a 33 year old female who presents with leukocytosis, torsion of ovarian mass, and right submandibular space abscess associated with teeth #31 and 32. Initial presentation for abdominal pain with noted 25cm mass and concern for torsion. Today patient started to complain about right mandibular pain and swelling with difficulty swallowing. CT max face shows carious teeth #31 and 32. The patient's history is provided by patient and EMR.      REVIEW OF SYSTEMS:  ROS: A 12 pt review of systems was conducted. Patient denies any other changes to history EXCEPT FOR as noted above.     PMH  Past Medical History:   Diagnosis Date    Depression     Fibromyalgia     Mild intermittent asthma     Obesity (BMI 30-39.9)        PSH  Past Surgical History:   Procedure Laterality Date    NO HISTORY OF SURGERY         CURRENT MEDS     Current Facility-Administered Medications   Medication Dose Route Frequency Provider Last Rate Last Admin    acetaminophen (TYLENOL) tablet 650 mg  650 mg Oral Q4H PRN Kyaw Chu DO        Or    acetaminophen (TYLENOL) Suppository 650 mg  650 mg Rectal Q4H PRN Kyaw Chu DO        benzocaine-menthol (CHLORASEPTIC) 6-10 MG lozenge 1 lozenge  1 lozenge Buccal Q1H PRN Kyaw Chu DO         bisacodyl (DULCOLAX) suppository 10 mg  10 mg Rectal Daily PRN Kyaw Chu DO        calcium carbonate (TUMS) chewable tablet 1,000 mg  1,000 mg Oral 4x Daily PRN Kyaw Chu DO        docusate sodium (COLACE) capsule 100 mg  100 mg Oral BID Kyaw Chu DO   100 mg at 06/14/24 0740    guaiFENesin (ROBITUSSIN) 20 mg/mL solution 200 mg  200 mg Oral Q4H PRN Kyaw Chu DO        hydrALAZINE (APRESOLINE) tablet 10 mg  10 mg Oral Q4H PRN Kyaw Chu DO        Or    hydrALAZINE (APRESOLINE) injection 10 mg  10 mg Intravenous Q4H PRN Kyaw Chu DO        HYDROmorphone (DILAUDID) injection 0.2 mg  0.2 mg Intravenous Q2H PRN Kyaw Chu DO        HYDROmorphone (PF) (DILAUDID) injection 0.5 mg  0.5 mg Intravenous Q1H PRN Lamine Wallace MD   0.5 mg at 06/14/24 1347    lidocaine (LMX4) cream   Topical Q1H PRN Kyaw Chu DO        lidocaine 1 % 0.1-1 mL  0.1-1 mL Other Q1H PRN Kyaw Chu DO        lidocaine inhalant 4% nebulizer solution 3 mL  3 mL Nebulization Once Amna Navarro MD        melatonin tablet 3 mg  3 mg Oral At Bedtime PRN Kyaw Chu DO        meropenem (MERREM) 1 g vial to attach to  mL bag  1 g Intravenous Q8H Lamine Wallace  mL/hr at 06/14/24 1045 1 g at 06/14/24 1045    naloxone (NARCAN) injection 0.2 mg  0.2 mg Intravenous Q2 Min PRN Kyaw Chu DO        Or    naloxone (NARCAN) injection 0.4 mg  0.4 mg Intravenous Q2 Min PRN Kyaw Chu DO        Or    naloxone (NARCAN) injection 0.2 mg  0.2 mg Intramuscular Q2 Min PRN Kyaw Chu DO        Or    naloxone (NARCAN) injection 0.4 mg  0.4 mg Intramuscular Q2 Min PRN Kyaw Chu         ondansetron (ZOFRAN ODT) ODT tab 4 mg  4 mg Oral Q6H PRN Kyaw Chu DO        Or    ondansetron (ZOFRAN) injection 4 mg  4 mg Intravenous Q6H PRN Kyaw Chu,         oxyCODONE  (ROXICODONE) tablet 5 mg  5 mg Oral Q4H PRN Kyaw Chu DO        oxyCODONE IR (ROXICODONE) half-tab 2.5 mg  2.5 mg Oral Q4H PRN Kyaw Chu DO        polyethylene glycol (MIRALAX) Packet 17 g  17 g Oral BID PRN Kyaw Chu DO        prochlorperazine (COMPAZINE) injection 10 mg  10 mg Intravenous Q6H PRN Kyaw Chu DO        Or    prochlorperazine (COMPAZINE) tablet 10 mg  10 mg Oral Q6H PRN Kyaw Chu DO        Or    prochlorperazine (COMPAZINE) suppository 25 mg  25 mg Rectal Q12H PRN Kyaw Chu DO        senna-docusate (SENOKOT-S/PERICOLACE) 8.6-50 MG per tablet 1 tablet  1 tablet Oral BID PRN Kyaw Chu DO        Or    senna-docusate (SENOKOT-S/PERICOLACE) 8.6-50 MG per tablet 2 tablet  2 tablet Oral BID PRN Kyaw Chu DO        sodium chloride (PF) 0.9% PF flush 3 mL  3 mL Intracatheter Q8H Kyaw Chu DO   3 mL at 24 0745    sodium chloride (PF) 0.9% PF flush 3 mL  3 mL Intracatheter q1 min prn Kyaw Chu DO        sodium chloride 0.9 % infusion   Intravenous Continuous Kyaw Chu DO   Paused at 24 1302       ALLERGIES/SENSITIVITIES    Allergies   Allergen Reactions    Rocephin [Ceftriaxone Sodium] Anaphylaxis    Latex Hives    Penicillins Hives    Wellbutrin [Bupropion]      Irritability       SOC HX  Social History     Socioeconomic History    Marital status: Single     Spouse name: Not on file    Number of children: Not on file    Years of education: Not on file    Highest education level: Not on file   Occupational History    Occupation:    Tobacco Use    Smoking status: Former     Current packs/day: 0.00     Average packs/day: 0.5 packs/day for 12.0 years (6.0 ttl pk-yrs)     Types: Cigarettes     Start date: 10/26/2006     Quit date: 10/26/2018     Years since quittin.6    Smokeless tobacco: Never   Substance and Sexual Activity    Alcohol use: No      Alcohol/week: 0.0 standard drinks of alcohol    Drug use: No    Sexual activity: Yes     Partners: Male     Birth control/protection: Injection   Other Topics Concern    Parent/sibling w/ CABG, MI or angioplasty before 65F 55M? Not Asked     Service No    Blood Transfusions No    Caffeine Concern No    Occupational Exposure No    Hobby Hazards No    Sleep Concern No    Stress Concern No    Weight Concern No    Special Diet No    Back Care No    Exercise No    Bike Helmet No    Seat Belt Yes    Self-Exams Yes   Social History Narrative    In a relationship.     3 kids (13, 11, and 5 as of 2021).     No formal exercise.      Social Determinants of Health     Financial Resource Strain: Not on file   Food Insecurity: Not on file   Transportation Needs: Not on file   Physical Activity: Not on file   Stress: Not on file   Social Connections: Not on file   Interpersonal Safety: Not on file   Housing Stability: Not on file       FAM HX  Family History   Problem Relation Age of Onset    Myocardial Infarction Maternal Grandmother         s/p PCI; later in life    Myocardial Infarction Maternal Grandfather         s/p CABG; later in life    Diabetes No family hx of     Cerebrovascular Disease No family hx of     Coronary Artery Disease Early Onset No family hx of     Breast Cancer No family hx of     Ovarian Cancer No family hx of     Colon Cancer No family hx of         PHYSICAL EXAM  /80 (BP Location: Left arm)   Pulse 75   Temp 99  F (37.2  C) (Oral)   Resp 17   Wt 131.5 kg (290 lb)   SpO2 96%   BMI 46.81 kg/m     Admission on 06/13/2024   Component Date Value Ref Range Status    Sodium 06/13/2024 137  135 - 145 mmol/L Final    Reference intervals for this test were updated on 09/26/2023 to more accurately reflect our healthy population. There may be differences in the flagging of prior results with similar values performed with this method. Interpretation of those prior results can be made in the  context of the updated reference intervals.     Potassium 06/13/2024 4.5  3.4 - 5.3 mmol/L Final    Chloride 06/13/2024 102  98 - 107 mmol/L Final    Carbon Dioxide (CO2) 06/13/2024 21 (L)  22 - 29 mmol/L Final    Anion Gap 06/13/2024 14  7 - 15 mmol/L Final    Urea Nitrogen 06/13/2024 19.4  6.0 - 20.0 mg/dL Final    Creatinine 06/13/2024 1.28 (H)  0.51 - 0.95 mg/dL Final    GFR Estimate 06/13/2024 56 (L)  >60 mL/min/1.73m2 Final    eGFR calculated using 2021 CKD-EPI equation.    Calcium 06/13/2024 9.6  8.6 - 10.0 mg/dL Final    Glucose 06/13/2024 117 (H)  70 - 99 mg/dL Final    hCG Serum Qualitative 06/13/2024 Negative  Negative Final    This test is for screening purposes.  Results should be interpreted along with the clinical picture.  Confirmation testing is available if warranted by ordering [id number], HCG Quantitative Pregnancy.    Color Urine 06/13/2024 Yellow  Colorless, Straw, Light Yellow, Yellow Final    Appearance Urine 06/13/2024 Slightly Cloudy (A)  Clear Final    Glucose Urine 06/13/2024 70 (A)  Negative mg/dL Final    Bilirubin Urine 06/13/2024 Negative  Negative Final    Ketones Urine 06/13/2024 20 (A)  Negative mg/dL Final    Specific Gravity Urine 06/13/2024 1.027  1.003 - 1.035 Final    Blood Urine 06/13/2024 Negative  Negative Final    pH Urine 06/13/2024 6.0  5.0 - 7.0 Final    Protein Albumin Urine 06/13/2024 30 (A)  Negative mg/dL Final    Urobilinogen Urine 06/13/2024 Normal  Normal, 2.0 mg/dL Final    Nitrite Urine 06/13/2024 Negative  Negative Final    Leukocyte Esterase Urine 06/13/2024 Large (A)  Negative Final    Bacteria Urine 06/13/2024 Few (A)  None Seen /HPF Final    Mucus Urine 06/13/2024 Present (A)  None Seen /LPF Final    RBC Urine 06/13/2024 4 (H)  <=2 /HPF Final    WBC Urine 06/13/2024 46 (H)  <=5 /HPF Final    Squamous Epithelials Urine 06/13/2024 3 (H)  <=1 /HPF Final    WBC Count 06/13/2024 15.6 (H)  4.0 - 11.0 10e3/uL Final    RBC Count 06/13/2024 4.73  3.80 - 5.20 10e6/uL  Final    Hemoglobin 06/13/2024 14.3  11.7 - 15.7 g/dL Final    Hematocrit 06/13/2024 42.7  35.0 - 47.0 % Final    MCV 06/13/2024 90  78 - 100 fL Final    MCH 06/13/2024 30.2  26.5 - 33.0 pg Final    MCHC 06/13/2024 33.5  31.5 - 36.5 g/dL Final    RDW 06/13/2024 13.6  10.0 - 15.0 % Final    Platelet Count 06/13/2024 405  150 - 450 10e3/uL Final    % Neutrophils 06/13/2024 83  % Final    % Lymphocytes 06/13/2024 10  % Final    % Monocytes 06/13/2024 6  % Final    % Eosinophils 06/13/2024 0  % Final    % Basophils 06/13/2024 1  % Final    % Immature Granulocytes 06/13/2024 0  % Final    NRBCs per 100 WBC 06/13/2024 0  <1 /100 Final    Absolute Neutrophils 06/13/2024 13.0 (H)  1.6 - 8.3 10e3/uL Final    Absolute Lymphocytes 06/13/2024 1.6  0.8 - 5.3 10e3/uL Final    Absolute Monocytes 06/13/2024 0.9  0.0 - 1.3 10e3/uL Final    Absolute Eosinophils 06/13/2024 0.0  0.0 - 0.7 10e3/uL Final    Absolute Basophils 06/13/2024 0.1  0.0 - 0.2 10e3/uL Final    Absolute Immature Granulocytes 06/13/2024 0.1  <=0.4 10e3/uL Final    Absolute NRBCs 06/13/2024 0.0  10e3/uL Final    Culture 06/13/2024 Culture in progress   Preliminary    Lactic Acid POCT 06/14/2024 1.0  <=2.0 mmol/L Final    ---    Bicarbonate Venous POCT 06/14/2024 28  21 - 28 mmol/L Final    ---    O2 Sat, Venous POCT 06/14/2024 23 (L)  70 - 75 % Final    ---    pCO2 Venous POCT 06/14/2024 52 (H)  40 - 50 mm Hg Final    ---    pH Venous POCT 06/14/2024 7.33  7.32 - 7.43 Final    ---    pO2 Venous POCT 06/14/2024 18 (L)  25 - 47 mm Hg Final    ---    Base Excess/Deficit (+/-) POCT 06/14/2024 1.0  -3.0 - 3.0 mmol/L Final    ---     06/13/2024 39 (H)  <=38 U/mL Final    CEA 06/13/2024 3.5  ng/mL Final    Nonsmoker (past/never) <=5.0 ng/mL   Current smoker <=6.5 ng/mL     This result is obtained using the Roche Elecsys CEA method on the pantera [id number] immunoassay analyzer. Results obtained with different assay methods or kits cannot be used interchangeably.    AFP tumor  marker 06/13/2024 2.3  <=8.3 ng/mL Final    Lactate Dehydrogenase 06/14/2024 157  0 - 250 U/L Final    Hold Specimen 06/14/2024 JIC   Final    Hold Specimen 06/14/2024 JI   Final    WBC Count 06/14/2024 14.2 (H)  4.0 - 11.0 10e3/uL Final    RBC Count 06/14/2024 4.58  3.80 - 5.20 10e6/uL Final    Hemoglobin 06/14/2024 13.6  11.7 - 15.7 g/dL Final    Hematocrit 06/14/2024 41.6  35.0 - 47.0 % Final    MCV 06/14/2024 91  78 - 100 fL Final    MCH 06/14/2024 29.7  26.5 - 33.0 pg Final    MCHC 06/14/2024 32.7  31.5 - 36.5 g/dL Final    RDW 06/14/2024 13.7  10.0 - 15.0 % Final    Platelet Count 06/14/2024 334  150 - 450 10e3/uL Final    Sodium 06/14/2024 140  135 - 145 mmol/L Final    Reference intervals for this test were updated on 09/26/2023 to more accurately reflect our healthy population. There may be differences in the flagging of prior results with similar values performed with this method. Interpretation of those prior results can be made in the context of the updated reference intervals.     Potassium 06/14/2024 5.0  3.4 - 5.3 mmol/L Final    Carbon Dioxide (CO2) 06/14/2024 20 (L)  22 - 29 mmol/L Final    Anion Gap 06/14/2024 17 (H)  7 - 15 mmol/L Final    Urea Nitrogen 06/14/2024 15.3  6.0 - 20.0 mg/dL Final    Creatinine 06/14/2024 0.81  0.51 - 0.95 mg/dL Final    GFR Estimate 06/14/2024 >90  >60 mL/min/1.73m2 Final    eGFR calculated using 2021 CKD-EPI equation.    Calcium 06/14/2024 9.8  8.6 - 10.0 mg/dL Final    Chloride 06/14/2024 103  98 - 107 mmol/L Final    Glucose 06/14/2024 83  70 - 99 mg/dL Final    Alkaline Phosphatase 06/14/2024 74  40 - 150 U/L Final    AST 06/14/2024 21  0 - 45 U/L Final    Reference intervals for this test were updated on 6/12/2023 to more accurately reflect our healthy population. There may be differences in the flagging of prior results with similar values performed with this method. Interpretation of those prior results can be made in the context of the updated reference  intervals.    ALT 06/14/2024 14  0 - 50 U/L Final    Reference intervals for this test were updated on 6/12/2023 to more accurately reflect our healthy population. There may be differences in the flagging of prior results with similar values performed with this method. Interpretation of those prior results can be made in the context of the updated reference intervals.      Protein Total 06/14/2024 7.2  6.4 - 8.3 g/dL Final    Albumin 06/14/2024 4.0  3.5 - 5.2 g/dL Final    Bilirubin Total 06/14/2024 0.6  <=1.2 mg/dL Final    Magnesium 06/14/2024 2.1  1.7 - 2.3 mg/dL Final    Phosphorus 06/14/2024 3.5  2.5 - 4.5 mg/dL Final    ABO/RH(D) 06/14/2024 A POS   Final    Antibody Screen 06/14/2024 Negative  Negative Final    SPECIMEN EXPIRATION DATE 06/14/2024 [id number]   Final    Hold Specimen 06/14/2024 0   Final    Hold Specimen 06/14/2024 [facility]   Final      No images are attached to the encounter.   Results for orders placed or performed during the hospital encounter of 06/13/24   US Renal Limited    Narrative    EXAM: US RENAL LIMITED  LOCATION: [facility]  DATE: 6/13/2024    INDICATION: acute onset RLQ abdominal pain  COMPARISON: None.  TECHNIQUE: Routine Bilateral Renal and Bladder Ultrasound.    FINDINGS:    RIGHT KIDNEY: 11.9 x 4.9 x 5.9 cm. The estimated cortical thickness is 1.6 cm. Normal without hydronephrosis or solid renal mass.     OTHER: There is a large complex multiloculated cystic lesion within the anterior mid right abdomen measuring approximately 25 x 23 x 15 cm. This has no discrete internal vascular flow however internal solid components versus thick internal septations. It   is indeterminate.      Impression    IMPRESSION:  1.  Normal appearance of the right kidney.  2.  Large indeterminant complex cystic lesion in the anterior right abdomen. Further evaluation with a contrast-enhanced CT study is recommended.    __    NOTE: ABNORMAL REPORT    THE DICTATION ABOVE  DESCRIBES AN ABNORMALITY FOR WHICH FOLLOW-UP IS NEEDED.      CT Abdomen Pelvis w Contrast    Narrative    EXAM: CT ABDOMEN PELVIS W CONTRAST  LOCATION: [facility]  DATE: 6/13/2024    INDICATION: Acute RLQ pain. Possible mass found on ultrasound.  COMPARISON:   1. Ultrasound abdomen limited 6/13/2024.  2. Ultrasound pelvis complete with endovaginal study 7/6/2018.  TECHNIQUE: CT scan of the abdomen and pelvis was performed following injection of IV contrast. Multiplanar reformats were obtained. Dose reduction techniques were used.  CONTRAST: 135 mL Isovue 370.    FINDINGS:   LOWER CHEST: Clear. No pleural effusion on either side. Moderate hiatal hernia. Normal cardiac size. No pericardial effusion.    HEPATOBILIARY: No discrete lesion, calcified gallstones or biliary dilatation. Patent hepatic and portal veins.    PANCREAS: Normal.    SPLEEN: Normal splenic size. No discrete lesion. Tiny accessory splenule.    ADRENAL GLANDS: Normal.    KIDNEYS/BLADDER: No urinary tract calculi. Both kidneys are well perfused without hydronephrosis or hydroureter. Normal urinary bladder.    BOWEL: Scattered gas and formed stool material within normal-caliber colon. No mechanical obstruction or free gas. Normal appendix. Moderate hiatal hernia.    LYMPH NODES: No suspicious abdominopelvic adenopathy.    VASCULATURE: Normal-caliber abdominal aorta and the IVC. Mesenteric and bilateral renal arteries are well perfused.     PELVIC ORGANS: There is a large complex cystic mass arising from the right ovary measuring 1.3 cm AP x 25 cm transversely x 18 cm CC dimension (image 139, series 4, image 51, series 5, image 90, series 6). Multiple internal irregular septations. Findings   most likely represent a cystic ovarian neoplasm. Recommend correlative serum tumor markers and GYN consultation. Anteverted uterus containing an IUCD, well seated. Normal left ovary. Small amount of pelvic free fluid, likely  physiologic.    MUSCULOSKELETAL: Small fat-containing ventral abdominal wall hernia superior to the umbilicus (image 89, series 6). Minor degenerative changes involving the spine.      Impression    IMPRESSION:   1.  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 Recommend correlative serum tumor markers and GYN consultation. Anteverted uterus containing an IUCD, well seated. Normal left ovary. Small   amount of pelvic free fluid, likely physiologic.    2.  Moderate hiatal hernia. No mechanical bowel obstruction or free gas. Normal appendix. Small fat-containing ventral abdominal wall hernia superior to the umbilicus.    NOTE: ABNORMAL REPORT    THE DICTATION ABOVE DESCRIBES AN ABNORMALITY FOR WHICH FOLLOW-UP IS NEEDED.    MR Pelvis (GYN) w Contrast    Narrative    EXAM: MR PELVIS (GYN) W CONTRAST  LOCATION: [facility]  DATE: 6/14/2024    INDICATION: eval right ovarian mass  COMPARISON: CT 06/13/2024  TECHNIQUE: Routine MRI female pelvis protocol including T1 in/out phase, diffusion, thin section high resolution multiplane T2, and post contrast T1.  CONTRAST: 10 mL of Gadavist.    FINDINGS:     UTERUS: Intrauterine device present.    ENDOMETRIUM: Unremarkable.    ADNEXA: Redemonstration of large cystic right ovarian mass which demonstrates internal septations some of which appear thickened and irregular, measuring 25.4 x 13.6 x 19.1 cm (series 3 image 27 and series 5 image 23). Several areas of intrinsic T1   hyperintensity within the lesion suggestive of proteinaceous or hemorrhagic contents. No discrete area of enhancing nodularity. Lesion demonstrates mass effect on surrounding structures particularly bowel which is displaced. Left ovary appears   unremarkable.     ADDITIONAL FINDINGS: Small volume free fluid in the dependent pelvis.. No adenopathy.      Impression    IMPRESSION:  1.  Redemonstration of a large cystic right  ovarian mass which demonstrates internal septation some of which appear thickened and irregular, measuring 25.4 x 13.6 x 19.1 cm. No discrete area of enhancing nodularity. Lesion demonstrates mass effect on   surrounding structures particularly bowel which is displaced. Findings most consistent with ovarian cystadenoma, likely mucinous. Recommend correlation with tumor markers and GYN-ONC consultation.  2.  Small volume free fluid in the dependent pelvis.   CT Soft Tissue Neck w Contrast    Narrative    CT SCAN OF THE NECK WITH CONTRAST  6/14/2024 2:02 PM     HISTORY: Cervical lymphadenopathy, concern for dental abscess.    TECHNIQUE: Axial CT images of the neck following administration of  intravenous contrast with reformations. Radiation dose for this scan  was reduced using automated exposure control, adjustment of the mA  and/or kV according to patient size, or iterative reconstruction  technique. 90 mL Isovue-370 IV.     COMPARISON: None.     FINDINGS: There is a subperiosteal abscess along the medial aspect of  the right mandibular angle which measures 1.0 x 0.6 x 0.8 cm (series 3  image 48 and series 6 image 28). Marked surrounding inflammation and  fat stranding in this area, particularly involving the submandibular  space. Inflammation also extends to the right parapharyngeal space. No  significant mass effect or narrowing of the airway. This subperiosteal  abscess likely arises from the most posterior right mandibular molar  which demonstrates a large carious lesion as well as periapical  lucency concerning for periapical abscess. Question subtle cortical  breakthrough through the medial aspect of the mandible in the region  of the periosteal abscess (series 3 image 45). There are also carious  lesions of the additional right mandibular molar which is slightly  anterior, although no definite cortical breakthrough around the root  of this tooth. No other loculated fluid collection to suggest  abscess.    Slight enlargement and hyperenhancement of the right submandibular  gland. No submandibular gland stone or ductal dilatation. Left  submandibular gland is unremarkable. Parotid glands are unremarkable.    Calcifications in the palatine tonsillar regions bilaterally are  indicative of chronic tonsillitis. No definite mucosal space mass.  Glottic structures are unremarkable.    Slight prominence of the upper cervical chain lymph nodes which are  likely reactive. No necrotic lymph nodes.    No suspicious bony lesions. Multiple carious lesions in the teeth.  Visualized paranasal sinuses are relatively clear. Visualized  intracranial structures are unremarkable. Visualized lung apices are  clear.      Impression    IMPRESSION:    1. Odontogenic subperiosteal abscess along the medial aspect of the  right mandibular angle measuring up to 1 cm with marked surrounding  inflammation extending into the right parapharyngeal space and  submandibular space. This subperiosteal abscess likely arises from the  most posterior right mandibular molar which demonstrates a carious  lesion as well as a periapical abscess.  2. Marked additional odontogenic disease.  3. Right submandibular gland appears edematous and enlarged. This is  favored to be reactive to cellulitis and odontogenic infection. No  submandibular gland stone or ductal dilatation.  4. Prominent upper cervical chain lymph nodes which are favored to be  reactive. Clinical follow-up is warranted.    DOMINGO VOGEL MD         SYSTEM ID:  [id number]        Gen: lying in bed, alert, oriented x 3, No acute distress  HEENT:   E/O  Normocephalic; right mandibular swelling and pain. Cannot palpate inferior border.   Neck: Trachea midline, right neck swelling, cervical lymphandenopathy  TMJ: ROMERO: 30mm, mild trismus   I/O  No floor of mouth elevation. No pharyngeal draping. Mallampati III. Carious teeth #31 and 32. Fluctuance in the mylohyoid space/superior submandibular  space    Gen: lying in bed, alert, oriented x 3, NAD   Skin: no rash seen   CV: RRR  Lungs: CTAB, no wheezes or crackles   Abd: Did not palpate due to extreme pain  Ext: bilateral lower edema absent  Neuro: Cranial nerve II-XII grossly intact, motor power V/V all extremities

## 2024-06-14 NOTE — ED PROVIDER NOTES
Emergency Department Note      History of Present Illness     Chief Complaint  Abdominal Pain    HPI  Erica Gordon is a 33 year old female presents emergency department for the complaint of right lower quadrant pain.  Patient reports that her abdominal pain started approximately 4 hours ago.  She states that her pain is primarily located in the right lower quadrant and radiates down to the right side of her groin and down her right leg.  She describes this pain as a sharp stabbing pain and rates her pain level at 10/10.  She reports to me that she is also been experiencing difficulty with urination and has not had a bowel movement for approximately 1 week.  She denies any recent fever, chills, nausea, vomiting or diarrhea.            Independent Historian  None    Review of External Notes  None  Past Medical History   Medical History and Problem List  Past Medical History:   Diagnosis Date    Depression     Fibromyalgia     Mild intermittent asthma     Obesity (BMI 30-39.9)        Medications  albuterol (VENTOLIN HFA) 108 (90 Base) MCG/ACT inhaler  FLUoxetine (PROZAC) 20 MG capsule  levonorgestrel (MIRENA) 20 MCG/24HR IUD        Surgical History   Past Surgical History:   Procedure Laterality Date    NO HISTORY OF SURGERY       Physical Exam   Patient Vitals for the past 24 hrs:   BP Temp Temp src Pulse Resp SpO2 Weight   06/13/24 2255 (!) 163/90 -- -- -- 14 97 % --   06/13/24 2222 (!) 186/106 -- -- -- 14 99 % --   06/13/24 2100 (!) 165/94 -- -- 56 18 98 % --   06/13/24 1952 (!) 176/96 -- -- 54 -- 99 % --   06/13/24 1909 (!) 178/106 -- -- -- -- -- --   06/13/24 1908 -- 97.1  F (36.2  C) Temporal 67 20 100 % 131.5 kg (290 lb)     Physical Exam  General: Awake, alert, non-toxic.  Head:  Scalp is NC/AT  Eyes:  Conjunctiva normal, PERRL  ENT:  The external nose and ears are normal.     Oropharynx clear, uvula midline.  Neck:  Normal range of motion without rigidity.  CV:  Regular rate and rhythm    No pathologic  murmur, rubs, or gallops.  Resp:  Breath sounds are clear bilaterally    Non-labored, no retractions or accessory muscle use  Abdomen: Abdomen is soft, no distension, no masses. No CVA tenderness. Tenderness noted to the RLQ.  MS:  No lower extremity edema/swelling. No midline cervical, thoracic, or lumbar tenderness.  Extremities without joint swelling or redness.  Skin:  Warm and dry, No rash or lesions noted.  Neuro:  Alert and oriented.  GCS 15 Moves all extremities normal.  No facial asymmetry. Gait normal.  Psych: Awake. Alert. Normal affect. Appropriate interactions.   Diagnostics   Lab Results   Labs Ordered and Resulted from Time of ED Arrival to Time of ED Departure   BASIC METABOLIC PANEL - Abnormal       Result Value    Sodium 137      Potassium 4.5      Chloride 102      Carbon Dioxide (CO2) 21 (*)     Anion Gap 14      Urea Nitrogen 19.4      Creatinine 1.28 (*)     GFR Estimate 56 (*)     Calcium 9.6      Glucose 117 (*)    ROUTINE UA WITH MICROSCOPIC REFLEX TO CULTURE - Abnormal    Color Urine Yellow      Appearance Urine Slightly Cloudy (*)     Glucose Urine 70 (*)     Bilirubin Urine Negative      Ketones Urine 20 (*)     Specific Gravity Urine 1.027      Blood Urine Negative      pH Urine 6.0      Protein Albumin Urine 30 (*)     Urobilinogen Urine Normal      Nitrite Urine Negative      Leukocyte Esterase Urine Large (*)     Bacteria Urine Few (*)     Mucus Urine Present (*)     RBC Urine 4 (*)     WBC Urine 46 (*)     Squamous Epithelials Urine 3 (*)    CBC WITH PLATELETS AND DIFFERENTIAL - Abnormal    WBC Count 15.6 (*)     RBC Count 4.73      Hemoglobin 14.3      Hematocrit 42.7      MCV 90      MCH 30.2      MCHC 33.5      RDW 13.6      Platelet Count 405      % Neutrophils 83      % Lymphocytes 10      % Monocytes 6      % Eosinophils 0      % Basophils 1      % Immature Granulocytes 0      NRBCs per 100 WBC 0      Absolute Neutrophils 13.0 (*)     Absolute Lymphocytes 1.6      Absolute  Monocytes 0.9      Absolute Eosinophils 0.0      Absolute Basophils 0.1      Absolute Immature Granulocytes 0.1      Absolute NRBCs 0.0     HCG QUALITATIVE PREGNANCY - Normal    hCG Serum Qualitative Negative     URINE CULTURE       Imaging  CT Abdomen Pelvis w Contrast   Preliminary Result   IMPRESSION:    1.  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 Recommend correlative serum tumor markers and GYN consultation. Anteverted uterus containing an IUCD, well seated. Normal left ovary. Small    amount of pelvic free fluid, likely physiologic.      2.  Moderate hiatal hernia. No mechanical bowel obstruction or free gas. Normal appendix. Small fat-containing ventral abdominal wall hernia superior to the umbilicus.      NOTE: ABNORMAL REPORT      THE DICTATION ABOVE DESCRIBES AN ABNORMALITY FOR WHICH FOLLOW-UP IS NEEDED.       US Renal Limited   Final Result   IMPRESSION:   1.  Normal appearance of the right kidney.   2.  Large indeterminant complex cystic lesion in the anterior right abdomen. Further evaluation with a contrast-enhanced CT study is recommended.      __      NOTE: ABNORMAL REPORT      THE DICTATION ABOVE DESCRIBES AN ABNORMALITY FOR WHICH FOLLOW-UP IS NEEDED.          US Pelvis Cmplt w Transvag & Doppler LmtPel Duplex Limited             EKG       Independent Interpretation  None  ED Course    Medications Administered  Medications   ondansetron (ZOFRAN) injection 4 mg (4 mg Intravenous $Given 6/13/24 1946)   lactated ringers BOLUS 1,000 mL (0 mLs Intravenous Stopped 6/13/24 2219)   HYDROmorphone (PF) (DILAUDID) injection 0.5 mg (0.5 mg Intravenous $Given 6/13/24 1947)   HYDROmorphone (PF) (DILAUDID) injection 0.5 mg (0.5 mg Intravenous $Given 6/13/24 2055)   HYDROmorphone (PF) (DILAUDID) injection 0.5 mg (0.5 mg Intravenous $Given 6/13/24 2215)   iopamidol (ISOVUE-370) solution 135 mL (135 mLs Intravenous $Given 6/13/24 2342)   Saline Flush  (79 mLs Intravenous $Given 6/13/24 6381)       Procedures  Procedures     Discussion of Management  Admitting Hospitalist, Dr. Richardson     Social Determinants of Health adding to complexity of care  None    ED Course  ED Course as of 06/14/24 0017   Thu Jun 13, 2024 1935 I obtained history and examined the patient as noted above.      Medical Decision Making / Diagnosis   CMS Diagnoses: None    MIPS     None    MDM  Erica Gordon is a 33 year old female who presents to the emergency department for the evaluation of right lower quadrant pain.  Upon physical examination patient is awake and alert.  She endorses severe right lower quadrant pain that radiates down her right groin.  Right differential diagnosis include but is not limited to diverticulitis, ovarian torsion, urinary tract infection, kidney stone, or bowel obstruction.  Her chemistry panel did not reveal any electrolyte abnormality.  She does have a slightly elevated creatinine at 1.28 and GFR 56.  This is consistent with acute kidney injury.  Her WBC count is also elevated at 15.6.  Due to like initial concern for ovarian torsion a pelvic ultrasound was ordered however this was negative.  Renal ultrasound was negative for any hydronephrosis process or intrarenal mass/stone.  She was found to have a large complex cystic lesion on the anterior portion of her right abdomen.  Because of these findings I elected to perform an abdominal CT.  Given the extent of this mass,  elevated WBC count, and poor pain control patient most likely to be admitted to the hospital.  I discussed this case with my attending who agreed to this treatment plan.  I also discussed this case with admitting hospitalist who agreed to admission.  all pertinent cardiology neurological findings were discussed with the patient patient verbalized understanding.  All questions and concerns were addressed.    Disposition  Care of the patient was transferred to my colleague Dr. Casey  pending CT results.     ICD-10 Codes:    ICD-10-CM    1. Abdominal pain  R10.9            Discharge Medications  New Prescriptions    No medications on file         IRWIN Baum CNP, Casey, APRN CNP  06/14/24 0017

## 2024-06-14 NOTE — PROGRESS NOTES
RECEIVING UNIT ED HANDOFF REVIEW    ED Nurse Handoff Report was reviewed by: Lamont Clement RN on June 14, 2024 at 2:02 AM

## 2024-06-14 NOTE — ED PROVIDER NOTES
Patient signed out to me at midnight.  She is admitted for abdominal pain and a large abdominal mass.  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  Called OB/GYN on-call who referred me to GYN Onc on-call after discussion with them they recommended MRI of the pelvis which I have ordered.  They will evaluate the patient in person in the ED.  Continue plan of hospital admission.     Rony Casey MD  06/14/24 0127

## 2024-06-14 NOTE — ED TRIAGE NOTES
Pt c/o RLQ abd pain that started 4 hours ago and is 10/10. Pt denies N/V/D. Pt reports this happened a few weeks ago but went away. Pt also reports pain trying to urinate     Triage Assessment (Adult)       Row Name 06/13/24 1911          Triage Assessment    Airway WDL WDL        Respiratory WDL    Respiratory WDL WDL        Cardiac WDL    Cardiac WDL --  Pt hypertensive        Cognitive/Neuro/Behavioral WDL    Cognitive/Neuro/Behavioral WDL WDL

## 2024-06-14 NOTE — BRIEF OP NOTE
[facility]    Brief Operative Note    Pre-operative diagnosis: Ovarian mass [N83.8] Right submandibular space abscess, and carious teeth #31 and 32  Post-operative diagnosis Same as pre-operative diagnosis    Procedure: LAPAROSCOPY, Right - Abdomen  SALPINGO-OOPHORECTOMY, LAPAROSCOPIC, Right - Abdomen  LAPAROTOMY, EXPLORATORY, FOR STAGING, Bilateral - Abdomen  Incision and drainage oral of right mandibular abscess, Right - Jaw  Extraction(s) dental of tooth # 31 and 32, N/A - Mouth    Surgeon: Surgeons and Role:  Panel 1:     * Flora Pressley MD - Primary     * Lamine Wallace MD - Assisting  Panel 2:     * Ezekiel Mcfarland DDS - Primary  Anesthesia: General   Estimated Blood Loss: Less than 50 ml for OMFS portion    Drains: 1/2 inch penrose in right neck   Specimens:   ID Type Source Tests Collected by Time Destination   A : right submandibular space abcess Abscess Mandible ANAEROBIC BACTERIAL CULTURE ROUTINE, GRAM STAIN, AEROBIC BACTERIAL CULTURE ROUTINE Flora Pressley MD 6/14/2024  6:10 PM      Findings:   Purulent drainage noted from #32 extraction site and right neck surgical approach .  Complications: None.  Implants: * No implants in log *

## 2024-06-14 NOTE — CONSULTS
Gynecologic Oncology Consult Note    Name: Erica Gordon    MRN: [id number]   YOB: 1990         We were asked to see Erica Gordon at the request of Dr. Chu for evaluation and treatment of large complex ovarian mass.          Chief Complaint:   Abdominal pain    History is obtained from the patient          History of Present Illness:   Erica Gordon is a 33 year old  female presented to the ED with abdominal pain. She state the pain came on suddenly in the afternoon when she was playing with her daughter and became progressively worse and constant, which brought her to the ED.  She denies nausea, vomiting, fevers, chills, or urinary complaints. Does have constipation and states have not had a bowel movement for almost 2 weeks.  Denies vaginal bleeding, with IUD in place.  She thought her abdominal distension was due to weight gain. Endorses low appetite due to feeling full.      She was previously seen by Dr. Vasquez (gyn onc) in  for evaluation of RLQ iso of a 7cm complex right adnexal mass.  was 15 then. Patient was subsequently lost to follow up.     History as below:     06:  US Pelvis:  4cm right adnexal cyst     07:  US Pelvis:  4.2cm Right adnexal cyst     11:  US Pelvis:  Right ovary with 5.5 x 3.4 x 2.8 cm cyst     7/16/15:  US Pelvis:  4.4 x 4.2 x 4.4 cm complex right adnexal cyst     10/13/15:  US OB:  6.2 x 4.9 x 7.5 cm complex right adnexal cyst     10/20/15:  :  15, consult with Dr. Vasquez.      11/23/15:   OB US: Left ovary is not visualized. Right ovary measures 8.0 x 3.6 x 5.4 cm and again demonstrates internal septation, but no associated abnormal color Doppler flow. Previously, this cystic lesion measured 7.5 x 4.9 x 6.2 cm.       Gyn Hx: Menses: currently suppressed with IUD Contraception: IUD H/o STIs: none    OB Hx: 3 prior vaginal deliveries           Past Medical History:     Past Medical History:   Diagnosis Date    Depression      Fibromyalgia     Mild intermittent asthma     Obesity (BMI 30-39.9)             Past Surgical History:     Past Surgical History:   Procedure Laterality Date    NO HISTORY OF SURGERY              Social History:     Social History     Tobacco Use    Smoking status: Former     Current packs/day: 0.00     Average packs/day: 0.5 packs/day for 12.0 years (6.0 ttl pk-yrs)     Types: Cigarettes     Start date: 10/26/2006     Quit date: 10/26/2018     Years since quittin.6    Smokeless tobacco: Never   Substance Use Topics    Alcohol use: No     Alcohol/week: 0.0 standard drinks of alcohol            Family History:     Family History   Problem Relation Age of Onset    Myocardial Infarction Maternal Grandmother         s/p PCI; later in life    Myocardial Infarction Maternal Grandfather         s/p CABG; later in life    Diabetes No family hx of     Cerebrovascular Disease No family hx of     Coronary Artery Disease Early Onset No family hx of     Breast Cancer No family hx of     Ovarian Cancer No family hx of     Colon Cancer No family hx of               Allergies:     Allergies   Allergen Reactions    Rocephin [Ceftriaxone Sodium] Anaphylaxis    Latex Hives    Penicillins Hives    Wellbutrin [Bupropion]      Irritability            Medications:     Current Facility-Administered Medications   Medication Dose Route Frequency Provider Last Rate Last Admin    HYDROmorphone (PF) (DILAUDID) injection 0.5 mg  0.5 mg Intravenous Once Denia Griffith MD        LORazepam (ATIVAN) injection 1 mg  1 mg Intravenous Once Rony Casey MD        ondansetron (ZOFRAN) injection 4 mg  4 mg Intravenous Q30 Min PRN David Hernandez APRN CNP   4 mg at 24     Current Outpatient Medications   Medication Sig Dispense Refill    albuterol (VENTOLIN HFA) 108 (90 Base) MCG/ACT inhaler Inhale 2 puffs into the lungs every 6 hours 18 g 0    FLUoxetine (PROZAC) 20 MG capsule Take 1 capsule (20 mg) by mouth daily 30 capsule 1     levonorgestrel (MIRENA) 20 MCG/24HR IUD 1 each (20 mcg) by Intrauterine route continuous               Review of Systems:    ROS: 10 point ROS negative other than those noted above in the HPI.       Physical Exam:     Vitals:    06/13/24 1952 06/13/24 2100 06/13/24 2222 06/13/24 2255   BP: (!) 176/96 (!) 165/94 (!) 186/106 (!) 163/90   Pulse: 54 56     Resp:  18 14 14   Temp:       TempSrc:       SpO2: 99% 98% 99% 97%   Weight:         General: NAD, appears uncomfortable  Resp: no respiratory distress, CTAB with no wheezes, rubs or rhonchi  CV: heart rate regular, S1, S2. No murmurs noted.   Abdomen: soft, obese, TTP in RLQ, no rebound or guarding.    : normal external genitalia, no CMT, right adnexal fullness and tenderness however difficult to assess size and mobility of mass due to body habitus  Ext: Warm, well perfused, non- edema  Neuro: appears intact grossly moving all 4 extremities equally.  Skin: No rashes or ecchymoses noted.      Data     Results for orders placed or performed during the hospital encounter of 06/13/24 (from the past 24 hour(s))   CBC with platelets + differential    Narrative    The following orders were created for panel order CBC with platelets + differential.  Procedure                               Abnormality         Status                     ---------                               -----------         ------                     CBC with platelets and d...[[id number]]  Abnormal            Final result                 Please view results for these tests on the individual orders.   Basic metabolic panel   Result Value Ref Range    Sodium 137 135 - 145 mmol/L    Potassium 4.5 3.4 - 5.3 mmol/L    Chloride 102 98 - 107 mmol/L    Carbon Dioxide (CO2) 21 (L) 22 - 29 mmol/L    Anion Gap 14 7 - 15 mmol/L    Urea Nitrogen 19.4 6.0 - 20.0 mg/dL    Creatinine 1.28 (H) 0.51 - 0.95 mg/dL    GFR Estimate 56 (L) >60 mL/min/1.73m2    Calcium 9.6 8.6 - 10.0 mg/dL    Glucose 117 (H) 70 - 99 mg/dL    HCG QUALitative pregnancy (blood)   Result Value Ref Range    hCG Serum Qualitative Negative Negative   CBC with platelets and differential   Result Value Ref Range    WBC Count 15.6 (H) 4.0 - 11.0 10e3/uL    RBC Count 4.73 3.80 - 5.20 10e6/uL    Hemoglobin 14.3 11.7 - 15.7 g/dL    Hematocrit 42.7 35.0 - 47.0 %    MCV 90 78 - 100 fL    MCH 30.2 26.5 - 33.0 pg    MCHC 33.5 31.5 - 36.5 g/dL    RDW 13.6 10.0 - 15.0 %    Platelet Count 405 150 - 450 10e3/uL    % Neutrophils 83 %    % Lymphocytes 10 %    % Monocytes 6 %    % Eosinophils 0 %    % Basophils 1 %    % Immature Granulocytes 0 %    NRBCs per 100 WBC 0 <1 /100    Absolute Neutrophils 13.0 (H) 1.6 - 8.3 10e3/uL    Absolute Lymphocytes 1.6 0.8 - 5.3 10e3/uL    Absolute Monocytes 0.9 0.0 - 1.3 10e3/uL    Absolute Eosinophils 0.0 0.0 - 0.7 10e3/uL    Absolute Basophils 0.1 0.0 - 0.2 10e3/uL    Absolute Immature Granulocytes 0.1 <=0.4 10e3/uL    Absolute NRBCs 0.0 10e3/uL   UA with Microscopic reflex to Culture    Specimen: Urine, Clean Catch   Result Value Ref Range    Color Urine Yellow Colorless, Straw, Light Yellow, Yellow    Appearance Urine Slightly Cloudy (A) Clear    Glucose Urine 70 (A) Negative mg/dL    Bilirubin Urine Negative Negative    Ketones Urine 20 (A) Negative mg/dL    Specific Gravity Urine 1.027 1.003 - 1.035    Blood Urine Negative Negative    pH Urine 6.0 5.0 - 7.0    Protein Albumin Urine 30 (A) Negative mg/dL    Urobilinogen Urine Normal Normal, 2.0 mg/dL    Nitrite Urine Negative Negative    Leukocyte Esterase Urine Large (A) Negative    Bacteria Urine Few (A) None Seen /HPF    Mucus Urine Present (A) None Seen /LPF    RBC Urine 4 (H) <=2 /HPF    WBC Urine 46 (H) <=5 /HPF    Squamous Epithelials Urine 3 (H) <=1 /HPF    Narrative    Urine Culture ordered based on laboratory criteria   US Renal Limited    Narrative    EXAM: US RENAL LIMITED  LOCATION: [facility]  DATE: 6/13/2024    INDICATION: acute onset RLQ  abdominal pain  COMPARISON: None.  TECHNIQUE: Routine Bilateral Renal and Bladder Ultrasound.    FINDINGS:    RIGHT KIDNEY: 11.9 x 4.9 x 5.9 cm. The estimated cortical thickness is 1.6 cm. Normal without hydronephrosis or solid renal mass.     OTHER: There is a large complex multiloculated cystic lesion within the anterior mid right abdomen measuring approximately 25 x 23 x 15 cm. This has no discrete internal vascular flow however internal solid components versus thick internal septations. It   is indeterminate.      Impression    IMPRESSION:  1.  Normal appearance of the right kidney.  2.  Large indeterminant complex cystic lesion in the anterior right abdomen. Further evaluation with a contrast-enhanced CT study is recommended.    __    NOTE: ABNORMAL REPORT    THE DICTATION ABOVE DESCRIBES AN ABNORMALITY FOR WHICH FOLLOW-UP IS NEEDED.      CT Abdomen Pelvis w Contrast    Narrative    EXAM: CT ABDOMEN PELVIS W CONTRAST  LOCATION: [facility]  DATE: 6/13/2024    INDICATION: Acute RLQ pain. Possible mass found on ultrasound.  COMPARISON:   1. Ultrasound abdomen limited 6/13/2024.  2. Ultrasound pelvis complete with endovaginal study 7/6/2018.  TECHNIQUE: CT scan of the abdomen and pelvis was performed following injection of IV contrast. Multiplanar reformats were obtained. Dose reduction techniques were used.  CONTRAST: 135 mL Isovue 370.    FINDINGS:   LOWER CHEST: Clear. No pleural effusion on either side. Moderate hiatal hernia. Normal cardiac size. No pericardial effusion.    HEPATOBILIARY: No discrete lesion, calcified gallstones or biliary dilatation. Patent hepatic and portal veins.    PANCREAS: Normal.    SPLEEN: Normal splenic size. No discrete lesion. Tiny accessory splenule.    ADRENAL GLANDS: Normal.    KIDNEYS/BLADDER: No urinary tract calculi. Both kidneys are well perfused without hydronephrosis or hydroureter. Normal urinary bladder.    BOWEL: Scattered gas and formed stool  material within normal-caliber colon. No mechanical obstruction or free gas. Normal appendix. Moderate hiatal hernia.    LYMPH NODES: No suspicious abdominopelvic adenopathy.    VASCULATURE: Normal-caliber abdominal aorta and the IVC. Mesenteric and bilateral renal arteries are well perfused.     PELVIC ORGANS: There is a large complex cystic mass arising from the right ovary measuring 1.3 cm AP x 25 cm transversely x 18 cm CC dimension (image 139, series 4, image 51, series 5, image 90, series 6). Multiple internal irregular septations. Findings   most likely represent a cystic ovarian neoplasm. Recommend correlative serum tumor markers and GYN consultation. Anteverted uterus containing an IUCD, well seated. Normal left ovary. Small amount of pelvic free fluid, likely physiologic.    MUSCULOSKELETAL: Small fat-containing ventral abdominal wall hernia superior to the umbilicus (image 89, series 6). Minor degenerative changes involving the spine.      Impression    IMPRESSION:   1.  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 Recommend correlative serum tumor markers and GYN consultation. Anteverted uterus containing an IUCD, well seated. Normal left ovary. Small   amount of pelvic free fluid, likely physiologic.    2.  Moderate hiatal hernia. No mechanical bowel obstruction or free gas. Normal appendix. Small fat-containing ventral abdominal wall hernia superior to the umbilicus.    NOTE: ABNORMAL REPORT    THE DICTATION ABOVE DESCRIBES AN ABNORMALITY FOR WHICH FOLLOW-UP IS NEEDED.    iStat Gases (lactate) venous, POCT   Result Value Ref Range    Lactic Acid POCT 1.0 <=2.0 mmol/L    Bicarbonate Venous POCT 28 21 - 28 mmol/L    O2 Sat, Venous POCT 23 (L) 70 - 75 %    pCO2 Venous POCT 52 (H) 40 - 50 mm Hg    pH Venous POCT 7.33 7.32 - 7.43    pO2 Venous POCT 18 (L) 25 - 47 mm Hg    Base Excess/Deficit (+/-) POCT 1.0 -3.0 - 3.0 mmol/L         Assessment  and Recommendations   Impression:   Erica Gordon is a 33 year old  presents with abdominal pain with large complex right adnexal mass.       Plan:   # Large complex pelvic mass likely arising from right adnexa  # Abdominal pain  - On CT A/P, large complex cystic mass from right ovary measuring 25cm x 18 x 13cm with irregular septation. Small free fluid seen.  No abdominopelvic adenopathy noted.   - We discussed the differential for adnexal masses, which included benign, borderline, and malignant conditions. We reviewed that we cannot know for certain which a mass is without surgery to remove the mass; however, we can use patient factors such as age and family history, imaging, and lab results to guide our differential. Based upon these factors, I think it is likely the mass appears like a mucinous cystadenoma. However, again, we cannot know for certain without pathology until surgery. Case requested for: diagnostic laparoscopy, unilateral salpingo-oophorectomy, possible exploratory laparotomy, possible cancer staging.  - Please obtain tumor markers: , CEA, , LDH, AFP, inhibin  - Keep patient NPO for add on surgery pending OR availability  - Pain control per primary team with IV pain meds    # CAITLIN   - Baseline Cr 0.77, on admission Cr 1.28. Renal ultrasound with normal appearing right kidney, no hydronephrosis. On CT - both kidneys appeared normal without obstruction  - CAITLIN likely prerenal with decreased PO intake due to mass/pain.   - Continue maintenance IVF hydration  - Avoid nephrotoxic agents  - Repeat BMP    # Constipation  - Continue bowel regimen    # Hypertension  - Management per medicine team       Thank you for allowing us to be involved in the care of your patient. Please do not hesitate to give us a call with further questions.     Gyn Onc Team Pager: [phone]     Patient to be seen and care plan discussed under supervision of Dr. Pressley.    Denia Griffith MD  Gynecology Oncology  Fellow    6/14/2024 2:21 AM          --------------------------------------------------------------------    I saw and evaluated the patient. I reviewed CT scan, MRI, labs.  I agree with the findings and the plan of care as documented in Dr. Griffith 's note.    33 year old with enlarging pelvic mass. PMH with BMI 47, depression, asthma . I am suspicious for ovarian torsion given excruciating acute pain. I recommend surgery ASAP. Discussed surgical risks not limited to bleeding, infection, damage to surrounding organs, need for blood transfusions and ICU stay.   She also has a leukocytosis, CAITLIN. UA concerning for possible UTI. Could also have superinfected mass or intraabdominal infection.     Also concern for right sided mandibular/tooth abscess given lymphadenopathy and TTP of right mandible/neck.     - Discussed need for urgent surgical intervention   - Plan laparoscopic USO, possible cancer staging  - I also recommend removing both fallopian tubes. SHe is 100% certain she does not want future fertiltiy.   - Cnt IV narcotics, ativan until we can get an OR  -Start broad spectrum antibiotics given concern for infection (abdominal vs urinary source vs H&N) and possible early sepsis. Repeat labs (CBC/CMP/LA/Coags) this morning.   -Anesthesia consult for airway evaluation given tooth/mandible infection    Flora Pressley MD  Gynecologic Oncology  Pager [phone]

## 2024-06-14 NOTE — OP NOTE
Name: Erica Gordon  MRN: [id number]  : 1990  DOS: 24     Preoperative Diagnosis- Ovarian mass. Right Submandibular abscess  cellulitis, acute apical abscess teeth #31 and 32.      Post-operative Diagnosis- Same as preoperative     Surgeon: Flora Pressley MD, Ezekiel Mcfarland DDS, MD     Assistant: Lamine Wallace MD     Anesthesia: Oral intubation, 7cc 2% lidocaine with 1:200k epi     Anesthesiologist: Amna Navarro MD     Procedure: Incision and drainage of right submandibular space cellulitis/abscess and extraction of teeth #31 and 32. Laparoscopy as dictated by Dr. Pressley.     Indications for procedure: 32 yo female admitted for acute onset abdominal pain with CT showing a 25cm ovarian mass with concern for torsion and sepsis. During the admission it was noted that the patient has right submandibular space swelling due to carious teeth #31 and 32 and associated abscess. Given the emergent nature of the ovarian mass and abdominal pain, and the risk of airway issues related to the submandibular space abscess decision was made to take the patient to the OR for operative intervention for a combined case.     Procedure detail: Patient was greeted in the preoperative holding area where the risks and benefits of the the procedure was reviewed in detail.  She understood the need to remove teeth #31 and 32 and the need for a neck incision is that were required. Consent was then obtained and she was transported to the OR 30. The patient was transferred over to the operating table where operative monitors were placed followed by induction dose of anesthetic and placement of an oral ray.  This was secured and proper placement confirmed by anesthesia.  Patient was prepped and draped followed by timeout.     A throat pack was placed followed by administration of local anesthetic as noted above the bilateral inferior alveolar nerve blocks and local infiltration, as well as in the screen crease in the right  submandibular region (local anesthetic given first).  Incision with 15 blade through skin, connective tissue into the underyling fat approximately 2cm. Blunt dissection with hemostat into pus collection.  Aerobic, anaerobic, as well as Gram stain was obtained. 3ml of purulent drainage was removed from the submandibular abscess. Further blunt dissection using hemostat. This was followed by copious irrigation with normal saline solution and placement of two half-inch Penrose drains and secured with 3-0 silk suture.   #31: (Simple extraction)  Periosteal elevator used to elevate marginal gingiva.  151 Forceps delivery.  Curettage/irrigation. Lingual plate intact.   #32:  (Surgical extraction)  A 15 blade was used to make a crevicular incision with a distobuccal releasing incision.  A mucoperiosteal flap was elevated, and a surgical extraction was performed using a 560 bur in a highspeed handpiece under copious irrigation to create a buccal trough.  The tooth was then delivered with a forceps. The buccal del castillo was intact.  Curettage/irrigation.     Patient was hemostatic at the end of the procedure.  The oropharynx was evaluated and suction with Yankauer followed by removal of the throat pack. 4x4 gauze were placed over the extraction site. The patient was then turned over to Dr. Pressley to perform laparoscopy procedure as indicated in her operative report.       Drains: Right neck 1/2 inch penrose     Specimens: Anaerobic, aerobic, and Gram stain     Blood loss: 15ml     Ezekiel Mcfarland DDS, MD

## 2024-06-14 NOTE — PHARMACY-ADMISSION MEDICATION HISTORY
Pharmacist Admission Medication History    Admission medication history is complete. The information provided in this note is only as accurate as the sources available at the time of the update.    Information Source(s): Patient via phone    Pertinent Information: None    Changes made to PTA medication list:  Added: aleve, ibuprofen  Deleted: fluoxetine, albtuerol inhaler  Changed: None    Allergies reviewed with patient and updates made in EHR: no    Medication History Completed By: Nimco West RPH 6/14/2024 9:41 AM    PTA Med List   Medication Sig Last Dose    ibuprofen (ADVIL/MOTRIN) 200 MG capsule Take 400 mg by mouth every 4 hours as needed for fever or moderate pain  at prn    levonorgestrel (MIRENA) 20 MCG/24HR IUD 1 each (20 mcg) by Intrauterine route continuous     naproxen sodium 220 MG capsule Take 220 mg by mouth 2 times daily as needed (pain)  at prn

## 2024-06-14 NOTE — ED PROVIDER NOTES
Emergency Department Attending Supervision Note  6/4/2024  7:38 AM      I evaluated this patient in conjunction with IRWIN Baum CNP      Briefly, the patient presented with ***      On my exam, ***      ED Course: ***      I agree with David's impression and plan.      Diagnosis  ***      Keyla Chery MD

## 2024-06-14 NOTE — ED NOTES
DM Type II without sign of diabetic retinopathy and no blot heme on dilated retinal examination today OU No Macular Edema:  Discussed the pathophysiology of diabetes and its effect on the eye and risk of blindness. Stressed the importance of strong glucose control. Advised of importance of at least yearly dilated examinations but to contact us immediately for any problems or concerns. Seen and examined by gyne prior MRI.  Surgery will be in AM .

## 2024-06-15 LAB
ANION GAP SERPL CALCULATED.3IONS-SCNC: 11 MMOL/L (ref 7–15)
BACTERIA UR CULT: NORMAL
BUN SERPL-MCNC: 12.6 MG/DL (ref 6–20)
CALCIUM SERPL-MCNC: 8.5 MG/DL (ref 8.6–10)
CHLORIDE SERPL-SCNC: 103 MMOL/L (ref 98–107)
CREAT SERPL-MCNC: 0.73 MG/DL (ref 0.51–0.95)
DEPRECATED HCO3 PLAS-SCNC: 24 MMOL/L (ref 22–29)
EGFRCR SERPLBLD CKD-EPI 2021: >90 ML/MIN/1.73M2
ERYTHROCYTE [DISTWIDTH] IN BLOOD BY AUTOMATED COUNT: 13.4 % (ref 10–15)
GLUCOSE SERPL-MCNC: 95 MG/DL (ref 70–99)
HCT VFR BLD AUTO: 30.4 % (ref 35–47)
HGB BLD-MCNC: 10.2 G/DL (ref 11.7–15.7)
MAGNESIUM SERPL-MCNC: 2 MG/DL (ref 1.7–2.3)
MCH RBC QN AUTO: 30.4 PG (ref 26.5–33)
MCHC RBC AUTO-ENTMCNC: 33.6 G/DL (ref 31.5–36.5)
MCV RBC AUTO: 91 FL (ref 78–100)
PHOSPHATE SERPL-MCNC: 1.9 MG/DL (ref 2.5–4.5)
PLATELET # BLD AUTO: 278 10E3/UL (ref 150–450)
POTASSIUM SERPL-SCNC: 3.6 MMOL/L (ref 3.4–5.3)
RBC # BLD AUTO: 3.35 10E6/UL (ref 3.8–5.2)
SODIUM SERPL-SCNC: 138 MMOL/L (ref 135–145)
WBC # BLD AUTO: 12.5 10E3/UL (ref 4–11)

## 2024-06-15 PROCEDURE — 120N000001 HC R&B MED SURG/OB

## 2024-06-15 PROCEDURE — 258N000003 HC RX IP 258 OP 636: Mod: JZ | Performed by: HOSPITALIST

## 2024-06-15 PROCEDURE — 99232 SBSQ HOSP IP/OBS MODERATE 35: CPT | Performed by: HOSPITALIST

## 2024-06-15 PROCEDURE — 83735 ASSAY OF MAGNESIUM: CPT | Performed by: HOSPITALIST

## 2024-06-15 PROCEDURE — 36415 COLL VENOUS BLD VENIPUNCTURE: CPT | Performed by: HOSPITALIST

## 2024-06-15 PROCEDURE — 250N000013 HC RX MED GY IP 250 OP 250 PS 637

## 2024-06-15 PROCEDURE — 85027 COMPLETE CBC AUTOMATED: CPT | Performed by: HOSPITALIST

## 2024-06-15 PROCEDURE — 80048 BASIC METABOLIC PNL TOTAL CA: CPT | Performed by: HOSPITALIST

## 2024-06-15 PROCEDURE — 250N000013 HC RX MED GY IP 250 OP 250 PS 637: Performed by: HOSPITALIST

## 2024-06-15 PROCEDURE — 250N000011 HC RX IP 250 OP 636: Mod: JZ | Performed by: HOSPITALIST

## 2024-06-15 PROCEDURE — 84100 ASSAY OF PHOSPHORUS: CPT | Performed by: HOSPITALIST

## 2024-06-15 PROCEDURE — 250N000011 HC RX IP 250 OP 636: Mod: JZ

## 2024-06-15 PROCEDURE — 258N000003 HC RX IP 258 OP 636: Mod: JZ

## 2024-06-15 PROCEDURE — 250N000013 HC RX MED GY IP 250 OP 250 PS 637: Performed by: OBSTETRICS & GYNECOLOGY

## 2024-06-15 RX ORDER — HYDROMORPHONE HCL IN WATER/PF 6 MG/30 ML
0.2 PATIENT CONTROLLED ANALGESIA SYRINGE INTRAVENOUS
Status: DISCONTINUED | OUTPATIENT
Start: 2024-06-15 | End: 2024-06-15

## 2024-06-15 RX ORDER — CHLORHEXIDINE GLUCONATE ORAL RINSE 1.2 MG/ML
15 SOLUTION DENTAL 2 TIMES DAILY
Status: DISCONTINUED | OUTPATIENT
Start: 2024-06-15 | End: 2024-06-16 | Stop reason: HOSPADM

## 2024-06-15 RX ORDER — AMOXICILLIN 250 MG
2 CAPSULE ORAL 2 TIMES DAILY
Status: DISCONTINUED | OUTPATIENT
Start: 2024-06-15 | End: 2024-06-16 | Stop reason: HOSPADM

## 2024-06-15 RX ORDER — OXYCODONE HYDROCHLORIDE 5 MG/1
10 TABLET ORAL EVERY 4 HOURS PRN
Status: DISCONTINUED | OUTPATIENT
Start: 2024-06-15 | End: 2024-06-16 | Stop reason: HOSPADM

## 2024-06-15 RX ORDER — POTASSIUM CHLORIDE 1500 MG/1
20 TABLET, EXTENDED RELEASE ORAL ONCE
Status: COMPLETED | OUTPATIENT
Start: 2024-06-15 | End: 2024-06-15

## 2024-06-15 RX ORDER — POLYETHYLENE GLYCOL 3350 17 G/17G
17 POWDER, FOR SOLUTION ORAL DAILY
Status: DISCONTINUED | OUTPATIENT
Start: 2024-06-15 | End: 2024-06-16 | Stop reason: HOSPADM

## 2024-06-15 RX ORDER — BISACODYL 10 MG
10 SUPPOSITORY, RECTAL RECTAL DAILY PRN
Status: DISCONTINUED | OUTPATIENT
Start: 2024-06-15 | End: 2024-06-16 | Stop reason: HOSPADM

## 2024-06-15 RX ADMIN — SODIUM CHLORIDE, POTASSIUM CHLORIDE, SODIUM LACTATE AND CALCIUM CHLORIDE: 600; 310; 30; 20 INJECTION, SOLUTION INTRAVENOUS at 00:43

## 2024-06-15 RX ADMIN — ACETAMINOPHEN 650 MG: 325 TABLET, FILM COATED ORAL at 10:18

## 2024-06-15 RX ADMIN — HYDROMORPHONE HYDROCHLORIDE 0.2 MG: 0.2 INJECTION, SOLUTION INTRAMUSCULAR; INTRAVENOUS; SUBCUTANEOUS at 08:25

## 2024-06-15 RX ADMIN — IBUPROFEN 600 MG: 600 TABLET ORAL at 12:04

## 2024-06-15 RX ADMIN — CHLORHEXIDINE GLUCONATE 15 ML: 1.2 SOLUTION ORAL at 21:00

## 2024-06-15 RX ADMIN — SODIUM CHLORIDE, PRESERVATIVE FREE: 5 INJECTION INTRAVENOUS at 09:51

## 2024-06-15 RX ADMIN — CHLORHEXIDINE GLUCONATE 15 ML: 1.2 SOLUTION ORAL at 12:07

## 2024-06-15 RX ADMIN — OXYCODONE HYDROCHLORIDE 10 MG: 5 TABLET ORAL at 22:16

## 2024-06-15 RX ADMIN — POLYETHYLENE GLYCOL 3350 17 G: 17 POWDER, FOR SOLUTION ORAL at 12:04

## 2024-06-15 RX ADMIN — KETOROLAC TROMETHAMINE 15 MG: 15 INJECTION, SOLUTION INTRAMUSCULAR; INTRAVENOUS at 00:33

## 2024-06-15 RX ADMIN — ACETAMINOPHEN 650 MG: 325 TABLET, FILM COATED ORAL at 14:04

## 2024-06-15 RX ADMIN — MEROPENEM 1 G: 1 INJECTION, POWDER, FOR SOLUTION INTRAVENOUS at 17:59

## 2024-06-15 RX ADMIN — MAGNESIUM HYDROXIDE 30 ML: 400 SUSPENSION ORAL at 15:44

## 2024-06-15 RX ADMIN — POTASSIUM & SODIUM PHOSPHATES POWDER PACK 280-160-250 MG 2 PACKET: 280-160-250 PACK at 17:59

## 2024-06-15 RX ADMIN — OXYCODONE HYDROCHLORIDE 10 MG: 5 TABLET ORAL at 09:55

## 2024-06-15 RX ADMIN — POTASSIUM & SODIUM PHOSPHATES POWDER PACK 280-160-250 MG 2 PACKET: 280-160-250 PACK at 21:00

## 2024-06-15 RX ADMIN — MEROPENEM 1 G: 1 INJECTION, POWDER, FOR SOLUTION INTRAVENOUS at 10:21

## 2024-06-15 RX ADMIN — HYDROMORPHONE HYDROCHLORIDE 0.5 MG: 1 INJECTION, SOLUTION INTRAMUSCULAR; INTRAVENOUS; SUBCUTANEOUS at 01:35

## 2024-06-15 RX ADMIN — DOCUSATE SODIUM 100 MG: 100 CAPSULE, LIQUID FILLED ORAL at 09:55

## 2024-06-15 RX ADMIN — HYDROMORPHONE HYDROCHLORIDE 0.5 MG: 1 INJECTION, SOLUTION INTRAMUSCULAR; INTRAVENOUS; SUBCUTANEOUS at 05:17

## 2024-06-15 RX ADMIN — POTASSIUM CHLORIDE 20 MEQ: 1500 TABLET, EXTENDED RELEASE ORAL at 17:59

## 2024-06-15 RX ADMIN — IBUPROFEN 600 MG: 600 TABLET ORAL at 17:59

## 2024-06-15 RX ADMIN — OXYCODONE HYDROCHLORIDE 10 MG: 5 TABLET ORAL at 14:04

## 2024-06-15 RX ADMIN — MEROPENEM 1 G: 1 INJECTION, POWDER, FOR SOLUTION INTRAVENOUS at 01:35

## 2024-06-15 RX ADMIN — SENNOSIDES AND DOCUSATE SODIUM 2 TABLET: 50; 8.6 TABLET ORAL at 12:04

## 2024-06-15 RX ADMIN — OXYCODONE HYDROCHLORIDE 10 MG: 5 TABLET ORAL at 17:59

## 2024-06-15 ASSESSMENT — ACTIVITIES OF DAILY LIVING (ADL)
ADLS_ACUITY_SCORE: 28
ADLS_ACUITY_SCORE: 28
ADLS_ACUITY_SCORE: 37
ADLS_ACUITY_SCORE: 37
ADLS_ACUITY_SCORE: 36
ADLS_ACUITY_SCORE: 36
ADLS_ACUITY_SCORE: 31
ADLS_ACUITY_SCORE: 28
ADLS_ACUITY_SCORE: 36
ADLS_ACUITY_SCORE: 37
ADLS_ACUITY_SCORE: 35
ADLS_ACUITY_SCORE: 37
ADLS_ACUITY_SCORE: 36
ADLS_ACUITY_SCORE: 31
ADLS_ACUITY_SCORE: 37
ADLS_ACUITY_SCORE: 28
ADLS_ACUITY_SCORE: 36
ADLS_ACUITY_SCORE: 37
ADLS_ACUITY_SCORE: 37

## 2024-06-15 NOTE — ANESTHESIA CARE TRANSFER NOTE
Patient: Erica Gordon    Procedure: Procedure(s):  LAPAROSCOPY  Right SALPINGO-OOPHORECTOMY, Left Salpingectomy, LAPAROSCOPIC  Incision and drainage of right submandibular space abcess with penrose drain  Extraction(s) dental of tooth # 31 and 32       Diagnosis: Ovarian mass [N83.8]  Diagnosis Additional Information: No value filed.    Anesthesia Type:   General     Note:  Anesthesia Care Transfer Notewriter  Vitals:  Vitals Value Taken Time   /82 06/14/24 2120   Temp     Pulse 83 06/14/24 2124   Resp 22 06/14/24 2124   SpO2 96 % 06/14/24 2124   Vitals shown include unfiled device data.    Electronically Signed By: Abeba Fall  June 14, 2024  9:25 PM

## 2024-06-15 NOTE — OP NOTE
OPERATIVE NOTE    DATE OF PROCEDURE: 6/15/2024     PREOPERATIVE DIAGNOSES:   Complex pelvic mass  Acute abdominal pain, concerning for necrotic ovary and torsion  Dental abscess  BMI 44    POSTOPERATIVE DIAGNOSES:   Right ovarian torsion with necrosis  Dental abscess  BMI 44      PROCEDURE PERFORMED:     Laparoscopic Bilateral Salpingectomy with Right Oophorectomy (GYN ONC)  2. I & D of dental abscess, [place] #31 and [place]32 [place] [facility] [place]    SURGEON: Flora Pressley MD    ASSISTANTS:   Denia Griffith MD (GYN ONC fellow)       ANESTHESIA: General    SPECIMENS REMOVED: Bilateral fallopian tubes and right ovary    COMPLICATIONS: None    INDICATIONS: Erica is a 33 year old who presented with acute abdominal pain. Imaging demonstrated a large pelvic mass.  was 37.  Erica does not desire future fertility and has had an IUD in place for 7 years.  She was counseled on surgical options I recommended urgent removal of her right tube and ovary.  I also recommended removal of her left fallopian tube for cancer risk reduction as well as to prevent any problems with the left fallopian tube in the future.  Given that she does not desire future fertility she was amenable to this plan.    FINDINGS: On bimanual exam she had normal external genitalia.  The cervix was smooth and normal.  She had a large palpable abdominal mass.  I diagnostic laparoscopy she had a normal upper abdomen.  She had a very large torsed and necrotic appearing ovary approximately 25 cm.  This was the right-hand side.  She had a small amount of free fluid.  The ovary itself was filled with serous and mucinous fluid.  She also had approximately 500 cc of old blood within the mass.    DESCRIPTION OF PROCEDURE:     After informed consent the patient was brought to the operating room where general anesthesia was administered.     Please see the operative report dictated by Dr. Mcfarland for details regarding the dental extraction and incision and  drainage of the oral abscess.    Following completion of the oral surgery, she was then prepped and draped in the dorsal lithotomy position. A surgical timeout was performed.  She was on meropenem for antibiotics and no further antibiotics were given.  SCDs were placed on her lower extremities.     I entered the abdomen through a 5 mm incision in her left upper quadrant using the Visiport technique.  Pneumoperitoneum was established. r.     Under direct visualization I placed a 5 mm left mid abdominal quadrant trocar and a right mid abdominal 5 mm trocar.  Later I placed a 10 mm supraumbilical trocar.    The abdomen and pelvis were inspected with the above findings noted. She was placed in steep trendelenburg.     I first identified the right utero-ovarian vascular complex and coagulated and cut this.  I then coagulated and cut the right proximal fallopian tube.  Given my low suspicion for malignancy and the size of this mass I then drained the mass by incising the mass with monopolar scissors and then inserting the suction device.  In total I evacuated approximately 3 L of serous and mucinous fluid from the mass as well as old blood.     I placed a 15 cm bag through the midline incision.  I was able to place the mass into the bag as it was partially decompressed.  I then remove the bag through the small vertical midline incision and continued to morcellate the specimen while in the bag.  She had a lot of old blood and clot in the ovary, approximately 500 cc.    Once the specimen was removed I reinspected the abdomen and pelvis and all pedicles close were hemostatic.  I then closed the midline incision with running 0 Vicryl suture using a UR 6 needle.  The size of the fascial incision was approximately 3 cm.  I then evacuated the pneumoperitoneum.  All trocars were removed.  All surgical sites were then closed with 4-0 Monocryl suture and then Dermabond glue.  Frozen section returned as benign torsed ovary.     I  removed the Zamora catheter. The patient was woken from anesthesia and brought to the recovery room in a stable condition.

## 2024-06-15 NOTE — PROGRESS NOTES
"Brief Gyn Onc Note  4:18 PM    Called patient's nurse to check in. Sarah reports patient is doing well from a gynecologic/oncologic standpoint. She is currently on clear liquids because of her oral surgery. Her pain is improved better in the abdomen than oral pain. Ambulating frequently. Voiding spontaneously. Patient reports no BM in two weeks, hospitalist ordered scheduled bowel regimen, Milk of mag, and suppository will be next in line for treatment. Labs were drawn earlier in the afternoon (due to short staffing)    Vital signs:  Temp: 99.3  F (37.4  C) Temp src: Oral BP: 126/73 Pulse: 61   Resp: 18 SpO2: 99 % O2 Device: None (Room air) Oxygen Delivery: 1 LPM Height: 170.2 cm (5' 7\") Weight: 127.4 kg (280 lb 13.9 oz)  Estimated body mass index is 43.99 kg/m  as calculated from the following:    Height as of this encounter: 1.702 m (5' 7\").    Weight as of this encounter: 127.4 kg (280 lb 13.9 oz).     Date 06/15/24 0700 - 06/16/24 0659   Shift 7906-0212 3588-8732 9811-0381 24 Hour Total   INTAKE   P.O. 1000   1000   I.V. 1231   1231   Shift Total(mL/kg) 2231(17.51)   2231(17.51)   OUTPUT   Urine 375   375   Drains 40   40   Shift Total(mL/kg) 415(3.26)   415(3.26)   Weight (kg) 127.4 127.4 127.4 127.4     Vital signs reviewed. Of note, gyn onc would recommend lovenox ppx if patient is staying inpatient longer than one night, pending labs and inpatient medicine discretion.    Kaila Zamora MD  [facility]  Gynecology Oncology, PGY2  06/15/2024 4:25 PM    To reach the GYNECOLOGY ONCOLOGY team for this patient, please page [phone]        "

## 2024-06-15 NOTE — BRIEF OP NOTE
[facility]    Brief Operative Note    Pre-operative diagnosis: Ovarian mass [N83.8]  Post-operative diagnosis Same as pre-operative diagnosis    Procedure: LAPAROSCOPY, Right - Abdomen  Right SALPINGO-OOPHORECTOMY, Left Salpingectomy, LAPAROSCOPIC, Right - Abdomen  Incision and drainage of right submandibular space abcess with penrose drain, Right - Jaw  Extraction(s) dental of tooth # 31 and 32, N/A - Mouth    Surgeon: Surgeons and Role:  Panel 1:     * Flora Pressley MD - Primary     * Lamine Wlalace MD - Assisting  Panel 2:     * Ezekiel Mcfarland DDS - Primary  Denia Griffith MD - Assisting    Anesthesia: General   Estimated Blood Loss: 20 mL from 6/14/2024  5:23 PM to 6/14/2024  8:28 PM      Drains: Right submandibular drain     Specimens:   ID Type Source Tests Collected by Time Destination   1 : pelvic washings Washings Pelvis NON-GYNECOLOGIC CYTOLOGY Flora Pressley MD 6/14/2024  6:54 PM    2 : left fallopian tube Tissue Fallopian Tube, Left SURGICAL PATHOLOGY EXAM Flora Pressley MD 6/14/2024  7:10 PM    3 : RIGHT FALLOPIAN TUBE AND OVARY Tissue Ovary and Fallopian Tube, Right SURGICAL PATHOLOGY EXAM Flora Pressley MD 6/14/2024  7:29 PM    A : right submandibular space abcess Abscess Mandible ANAEROBIC BACTERIAL CULTURE ROUTINE, GRAM STAIN, AEROBIC BACTERIAL CULTURE ROUTINE Flora Pressley MD 6/14/2024  6:10 PM      Findings:     Exam under anesthesia: difficult due to body habitus, but pelvic mass extending above umbilicus and palpable and mobile.     Laparoscopy: No trauma on laparoscopic entry. No evidence of disease on bilateral diaphragms, normal appearing liver, stomach, gall bladder, omentum.     Large 30cm cystic and hemorrhagic mass stemming from right ovary occupying entire abdominal/pelvic cavity. Mass drained, contained and removed in bag. Evidence of torsion with right ovary twisted on IP ligament one time. Approximately 500cc hemorrhagic fluid evacuated  Chart check only.    Pathology report from left hemicolectomy 3/1/18 pending.    Continue post op cares per colorectal surgery. Plan follow up with Dr. Luke once pathology report finalized.     Please call if questions or concerns arise this weekend.   from mass.     Uterus appeared small and normal, left ovary and tube also appeared normal.     Intraoperative frozen pathology: benign    Complications: None.  Implants: * No implants in log *

## 2024-06-15 NOTE — PROGRESS NOTES
"Gynecology Oncology Progress Note  Priscila 15, 2024    HD#2 Abdominal pain  POD#1 s/p laparoscopic RSO, LS (GYN oncology), I&D of submandibular abscess and dental extraction (OMFS)    Disease: Right ovarian mass (frozen: benign), torsion       Subjective:   Abdominal pain present but much improved from yesterday  Voiding without reeves  No flatus/BM  Oral pain present. (I removed gauze in mouth at bedside). TOlerating sips of water after gauze removed.     Objective:   /75 (BP Location: Left arm)   Pulse 65   Temp 98.2  F (36.8  C) (Axillary)   Resp 15   Ht 1.702 m (5' 7\")   Wt 127.4 kg (280 lb 13.9 oz)   SpO2 95%   BMI 43.99 kg/m        General: NAD, laying in bed  ENT: Oral guaze removed. No active bleeding. Area of extraction visualized. Mandibular/neck penrose drain in place, no active draining.   Abd: Soft, minimal ttp. Incisions all in tact.       Results:    Morning labs (CBC/CMP) pending     Assessment & Plan: Erica Gordon is a 33 year old currently admitted to the hospitalist service HD#2 with abdominal pain due to ovarian mass with torsion. Also with oral abscess.  Now POD#1 s/p laparoscopy right salping-oophorectomy, left salpingectomy (frozen: benign) as well as I&D of submandibular abscess and dental extraction with OMFS.     # Postoperative care.     From GYN ONC standpoint, OK to stop fluids, transition to PO pain meds (I changed order to oxy 5-10mg q 4 hrs PRN)  Reeves out  Bowel regimen  Would not continue antibiotics for ovarian torsion, so will defer to OMFS for antibiotic plan  OK for discharge home from gyn onc standpoint, likely will need another day given oral abscess/drain management. Will arrange outpatient postop followup.        Flora Pressley MD  Gynecologic Oncology  Pager [phone]           "

## 2024-06-15 NOTE — PROGRESS NOTES
[facility] Progress Note    A/P: Erica Gordon is a 33 year old year old female POD 1 s/p laparoscopic RSO, LS (gyn oncology), I and D of right submandibular abscess with placement of penrose drain and extraction of teeth #31, 32.   1. Currently progressing well with appreciable improvement in pain control and starting to tolerate PO intake  2. Continue IV antibiotics with plan to transition to Levofloxacin and flagyl post operatively for 7 days after discharge  3. Keep head of bed elevated. Ice or heat to right neck.  4. Dental soft diet, salt water (4x daily) and peridex (2x daily) rinses   5. Likely remove drain tomorrow pending continued improvement  6. OMS will continue to follow during admission    Ezekiel Mcfarland DDS, MD  [facility]  [address], [address].  [place] [address]  Clinic/On call [phone]  Clinic Fax [phone]  Cell phone [phone]      S: Erica Gordon is a 33 year old year old female s/p s/p laparoscopic RSO, LS (gyn oncology), I and D of right submandibular abscess with placement of penrose drain and extraction of teeth #31, 32.   Pt is comfortably resting in bed with appreciable improvement in pain control. No PONV and starting to tolerate PO fluids.   Pt had no significant events since last visited.     O:   Temp:  [97.6  F (36.4  C)-100  F (37.8  C)] 98.2  F (36.8  C)  Pulse:  [] 65  Resp:  [14-20] 15  BP: (112-147)/() 116/75  SpO2:  [95 %-99 %] 95 %  Temp (24hrs), Av.7  F (37.1  C), Min:97.6  F (36.4  C), Max:100  F (37.8  C)       Intake/Output Summary (Last 24 hours) at 6/15/2024 1006  Last data filed at 6/15/2024 0949  Gross per 24 hour   Intake 3106 ml   Output 1275 ml   Net 1831 ml     Date 06/15/24 0700 - 24 0659   Shift 1819-0257 4174-9644 7557-5932 24 Hour Total   INTAKE   I.V. 706   706   Shift Total(mL/kg) 706(5.54)   706(5.54)   OUTPUT   Urine 100   100   Drains 40   40   Shift Total(mL/kg) 140(1.1)   140(1.1)  "  Weight (kg) 127.4 127.4 127.4 127.4        Gen: lying in bed, alert, oriented x 3, NAD    E/O  Normocephalic; right mandibular edema with penrose drain in place. Less tenderness today. Cannot palpate inferior border.   Neck: Trachea midline, stable/improving right neck edema with penrose drain in place, cervical lymphandenopathy  TMJ: ROMERO: 35mm, no trismus   I/O  No floor of mouth elevation. No pharyngeal draping. Mallampati III.Extraction sites are hemostatic #31 and 32    Gen: lying in bed, alert, oriented x 3, NAD   Skin: no rash seen   CV: RRR  Lungs: CTAB, no wheezes or crackles   Abd: Did not palpate post op  Ext: bilateral lower edema absent  Neuro: Cranial nerve II-XII grossly intact, motor power V/V all extremities     LABS   Lab Results   Component Value Date    WBC 14.2 06/14/2024    WBC 7.6 06/21/2021     Lab Results   Component Value Date    RBC 4.58 06/14/2024    RBC 4.84 06/21/2021     Lab Results   Component Value Date    HGB 13.6 06/14/2024    HGB 14.3 06/21/2021     Lab Results   Component Value Date    HCT 41.6 06/14/2024    HCT 44.3 06/21/2021     No components found for: \"MCT\"  Lab Results   Component Value Date    MCV 91 06/14/2024    MCV 92 06/21/2021     Lab Results   Component Value Date    MCH 29.7 06/14/2024    MCH 29.5 06/21/2021     Lab Results   Component Value Date    MCHC 32.7 06/14/2024    MCHC 32.3 06/21/2021     Lab Results   Component Value Date    RDW 13.7 06/14/2024    RDW 14.9 06/21/2021     Lab Results   Component Value Date     06/14/2024     06/21/2021       Last Basic Metabolic Panel:  Lab Results   Component Value Date     06/14/2024     08/16/2020      Lab Results   Component Value Date    POTASSIUM 5.0 06/14/2024    POTASSIUM 4.4 08/16/2020     Lab Results   Component Value Date    CHLORIDE 103 06/14/2024    CHLORIDE 108 08/16/2020     Lab Results   Component Value Date    JUAN 9.8 06/14/2024    JUAN 8.5 08/16/2020     Lab Results   Component " Value Date    CO2 20 06/14/2024    CO2 25 08/16/2020     Lab Results   Component Value Date    BUN 15.3 06/14/2024    BUN 15 08/16/2020     Lab Results   Component Value Date    CR 0.81 06/14/2024    CR 0.77 08/16/2020     Lab Results   Component Value Date    GLC 83 06/14/2024    GLC 99 08/16/2020

## 2024-06-15 NOTE — PROGRESS NOTES
Brief Gyn Onc Note    Vitals:    06/14/24 2200 06/14/24 2223 06/15/24 0033 06/15/24 0135   BP: 137/78 (!) 147/118 130/86 (!) 145/88   BP Location:   Left arm Left arm   Pulse:  110 91 76   Resp: 20 20 14   Temp: 100  F (37.8  C)      TempSrc: Axillary      SpO2:       Weight:       Height:         Called patient's nurse to check in. She reports that patient had a measured void since her Zamora was removed, and has also had some urinary incontinence (due to difficulty keeping Purewick in place). Pain is not well controlled yet, and she has so far received a dose of Toradol and Dilaudid this shift. Not taking any oral medications yet, because she does not feel like she will be able to swallow them. Not taking any PO intake yet. No vomiting.     Sabina Quesada MD  OB/GYN PGY-3  06/15/2024

## 2024-06-15 NOTE — PLAN OF CARE
Date & Time: 2300-0730.  Surgery/POD#: POD 1 from a Lap R salpingo-oophorectomy, L salpingectomy, extraction of the #31& 32 teeth, & I&D of R submandibular abscess w/ penrose drain placement.  Behavior & Aggression: Pt was pretty restless d/t uncontrolled pain this shift.  Fall Risk: Yes.  Orientation: A&Ox4.  ABNL VS/O2: VSS on RA, exp HTN.  ABNL Labs: see chart. On K+,Mag, Phos protocols, recheck in am labs.  Pain Management: Pt c/o significant pain this shift and is unable to swallow oral pills- gave PRN Toradol & Dilaudid.  Bowel/Bladder: Had a couple urinary incontinent episode this shift - purewick in place, hypoactive bowel sounds, no BM, passing no gas per pt.  IV/Drains: PIV infusing LR @ 100ml/hr. Penrose drain is KHUSHI.  Wounds/incisions: Abdominal incisions are C/D/I. R mouth incisions w/ dry gauze & strings attached are C/D/I. R neck incision w/small drainage from penrose drain. Noted mild facial swelling/bruising.  Diet: Clears - not tolerated this shift  Activity Level: Ax1 w/ GB & IV pole.  Tests/Procedures: N/A - Gen surg/ Oral Surg/GYN Onc following.  Anticipated  DC Date: TBD.

## 2024-06-15 NOTE — ANESTHESIA POSTPROCEDURE EVALUATION
Patient: Erica Gordon    Procedure: Procedure(s):  LAPAROSCOPY  Right SALPINGO-OOPHORECTOMY, Left Salpingectomy, LAPAROSCOPIC  Incision and drainage of right submandibular space abcess with penrose drain  Extraction(s) dental of tooth # 31 and 32       Anesthesia Type:  No value filed.    Note:  Disposition: Inpatient   Postop Pain Control: Uneventful            Sign Out: Well controlled pain   PONV: No   Neuro/Psych: Uneventful            Sign Out: Acceptable/Baseline neuro status   Airway/Respiratory: Uneventful            Sign Out: Acceptable/Baseline resp. status   CV/Hemodynamics: Uneventful            Sign Out: Acceptable CV status   Other NRE:    DID A NON-ROUTINE EVENT OCCUR? No           Last vitals:  Vitals Value Taken Time   /82 06/14/24 2120   Temp     Pulse 81 06/14/24 2123   Resp 14 06/14/24 2123   SpO2 96 % 06/14/24 2123   Vitals shown include unfiled device data.    Electronically Signed By: Abeba Fall  June 14, 2024  9:24 PM

## 2024-06-15 NOTE — PLAN OF CARE
PT settled. VSS ex T-Max 100 axillary. Dilaudid given for pain . One time dose toradol ordered but not given. Family updated. Has not taken any fluids. Lap sites WNL. Purewick in place. Small amount urine incontinence noted on bed on arrival from PACU.

## 2024-06-15 NOTE — PROGRESS NOTES
Discussed with Oncology,     - will plan for penrose drain in the right manidibular space, which will stay in for 48 hours.   - Completed culture, so should help with sensitivity. Can remain on meropenem for now.   - labs for morning.    - soft diet.   - reeves out, due to void tonight.    - laparoscopic right salpingo-oophorectomy, left salpingectomy, frozen benign.    - For pain, can do tylenol, motrin oxcycodone prn, and dilaudid for breakthrough pain.    - low dose fluid maintenance 75cc/hr overnight

## 2024-06-15 NOTE — PROVIDER NOTIFICATION
MD Notification    Notified Person: MD    Notified Person Name: Dr. Amaro    Notification Date/Time: 6/15/24 9820    Notification Interaction: vocera    Purpose of Notification:gyn-onc said if labs look good to start lovenox if patient is staying longer in the hospital     Orders Received:    Comments:

## 2024-06-15 NOTE — ANESTHESIA CARE TRANSFER NOTE
Patient: Erica Gordon    Procedure: Procedure(s):  LAPAROSCOPY  Right SALPINGO-OOPHORECTOMY, Left Salpingectomy, LAPAROSCOPIC  Incision and drainage of right submandibular space abcess with penrose drain  Extraction(s) dental of tooth # 31 and 32       Diagnosis: Ovarian mass [N83.8]  Diagnosis Additional Information: No value filed.    Anesthesia Type:   No value filed.     Note:    Oropharynx: oropharynx clear of all foreign objects  Level of Consciousness: awake  Oxygen Supplementation: face mask    Independent Airway: airway patency satisfactory and stable  Dentition: S/P dental procedure  Vital Signs Stable: post-procedure vital signs reviewed and stable  Report to RN Given: handoff report given  Patient transferred to: PACU    Handoff Report: Identifed the Patient, Identified the Reponsible Provider, Reviewed the pertinent medical history, Discussed the surgical course, Reviewed Intra-OP anesthesia mangement and issues during anesthesia, Set expectations for post-procedure period and Allowed opportunity for questions and acknowledgement of understanding  Vitals:  Vitals Value Taken Time   BP     Temp     Pulse     Resp     SpO2         Electronically Signed By: IRWIN Castorena CRNA  June 14, 2024  8:33 PM

## 2024-06-15 NOTE — PROGRESS NOTES
[facility]    Hospitalist Progress Note      Assessment & Plan   Erica Gordon is a 33 year old female admitted on 6/13/2024 with abdominal pain, large right ovarian cystic mass and right molar abscesses.    Abdominal pain, large indeterminant complex cystic lesion in the anterior right abdomen:   -Gyn Oncology consult; .  Status post exploratory laparotomy with RSO, LS, frozen benign, ovarian torsion.  -Postop pain management and surgical management to GYN oncology.  Continues on meropenem, not indicated for ovarian torsion per GYN oncology however also see below regarding dental abscesses and antibiotic management.  -Outpatient follow-up arranged per GYN oncology.    Right submandibular abscess status post I&D 6/14/2024, dental decay status post extraction 2 lower molars on right.  See OMFS notes.  Status post I&D and extraction as above.  Continues on meropenem on discharge recommends levothyroxine and Flagyl 7 days after discharge.  Keep head of bed elevated.  Soft diet.  Salt water and Peridex swish and spit.  Likely drain removal tomorrow.  -Patient can now open mouth, swallow foods.  P.o. pain medicines.  Afebrile.     Anxiety, depression: Continue prior to admission fluoxetine      Reactive airway disease: Continue prior to admission albuterol.  No dyspnea, stridor or wheeze.     DVT Prophylaxis: Ambulate every shift  Code Status: Full Code     Expected Discharge Date: 06/17/2024               Maryuri Amaro MD  Text Page (7am - 6pm, M-F)    Total time 50 minutes for today 6/15/2024 :  time consisted of the following, examination of patient, review of records including labs, imaging results, medications, interdisciplinary notes and completing documentation and orders.  Care Management included counseling/discussion with Patient regarding current condition including right submandibular abscess, ovarian cyst and Coordination of Care time with Nursing  and Specialists, GYN  oncology, Dr. Pressley and [facility] regarding care plan, management and surveillance, as above.     Interval History   Now can open mouth, take p.o.'s.  Notes pain however at surgical sites.  No bowel movement for over 2 weeks.  Denies nausea/emesis, on bowel program.    -Data reviewed today: I reviewed all new labs and imaging results over the last 24 hours.     Physical Exam   Temp: 98.3  F (36.8  C) Temp src: Oral BP: 125/72 Pulse: 66   Resp: 18 SpO2: 93 % O2 Device: None (Room air) Oxygen Delivery: 1 LPM  Vitals:    06/13/24 1908 06/15/24 0600   Weight: 131.5 kg (290 lb) 127.4 kg (280 lb 13.9 oz)       General/Constitutional:   NAD, alert, calm, cooperative, appears stronger.  HEENT cannot now open mouth.  Speech loss garbled.  Area of submandibular right sided I&D dressed, drain in.  Chest/Respiratory: Respirations nonlabored room air  Cardiovascular:  regular,   Gastrointestinal/Abdomen:  soft, nontender, no rebound, guarding or other peritoneal signs.  Abdominal wound dressed.  Neuro.  Gross motor tested, nonfocal,  Psych oriented, affect calm      Medications   Current Facility-Administered Medications   Medication Dose Route Frequency Provider Last Rate Last Admin    lactated ringers infusion   Intravenous Continuous Dipti Quesada  mL/hr at 06/15/24 0043 New Bag at 06/15/24 0043    sodium chloride 0.9 % infusion   Intravenous Continuous Kyaw Chu DO 75 mL/hr at 06/15/24 0951 New Bag at 06/15/24 0951     Current Facility-Administered Medications   Medication Dose Route Frequency Provider Last Rate Last Admin    chlorhexidine (PERIDEX) 0.12 % solution 15 mL  15 mL Swish & Spit BID Maryuri Amaro MD   15 mL at 06/15/24 1207    ibuprofen (ADVIL/MOTRIN) tablet 600 mg  600 mg Oral Q6H Dipti Quesada MD   600 mg at 06/15/24 1204    lidocaine inhalant 4% nebulizer solution 3 mL  3 mL Nebulization Once Amna Navarro MD        meropenem (MERREM) 1 g vial to attach to  mL bag  1 g  Intravenous Q8H Lamine Wallace  mL/hr at 06/14/24 1045 1 g at 06/15/24 1021    polyethylene glycol (MIRALAX) Packet 17 g  17 g Oral Daily Maryuri Amaro MD   17 g at 06/15/24 1204    senna-docusate (SENOKOT-S/PERICOLACE) 8.6-50 MG per tablet 2 tablet  2 tablet Oral BID Maryuri Amaro MD   2 tablet at 06/15/24 1204    sodium chloride (PF) 0.9% PF flush 3 mL  3 mL Intracatheter Q8H Kyaw Chu DO   3 mL at 06/15/24 1204    sodium chloride (PF) 0.9% PF flush 3 mL  3 mL Intracatheter Q8H Dipti Quesada MD   3 mL at 06/15/24 0044       Data   Recent Labs   Lab 06/14/24  1027 06/14/24  0847 06/13/24  1934   WBC 14.2*  --  15.6*   HGB 13.6  --  14.3   MCV 91  --  90     --  405   NA  --  140 137   POTASSIUM  --  5.0 4.5   CHLORIDE  --  103 102   CO2  --  20* 21*   BUN  --  15.3 19.4   CR  --  0.81 1.28*   ANIONGAP  --  17* 14   JUAN  --  9.8 9.6   GLC  --  83 117*   ALBUMIN  --  4.0  --    PROTTOTAL  --  7.2  --    BILITOTAL  --  0.6  --    ALKPHOS  --  74  --    ALT  --  14  --    AST  --  21  --

## 2024-06-15 NOTE — PLAN OF CARE
Date & Time: 6/15 0756-6427  Surgery/POD#: POD 1 from a Lap R salpingo-oophorectomy, L salpingectomy, extraction of the #31& 32 teeth, & I&D of R submandibular abscess w/ penrose drain placement.  Behavior & Aggression: Green  Fall Risk: Yes  Orientation: A&Ox4  ABNL VS/O2: VSS  ABNL Labs: AM labs not drawn - called lab at 1230 & 1400  Pain Management: Oxycodone, tylenol & ibuprofen  Bowel/Bladder: voiding small amounts frequently, 375 mL total with PVR 5 mL. No BM PRN MOM given  Drains: Penrose drain with moderate drainage, dressing changed  Wounds/incisions: lap sites WDL  Diet: Tolerating clear liquids, took apple juice with encouragement  Activity Level: SBA to BR frequently, ambulated in halls x1  Tests/Procedures: NA  Anticipated  DC Date: Tomorrow v. Sunday  Significant Information: salt water rinses in between Peridex

## 2024-06-16 VITALS
WEIGHT: 288.4 LBS | DIASTOLIC BLOOD PRESSURE: 88 MMHG | HEART RATE: 72 BPM | BODY MASS INDEX: 45.27 KG/M2 | OXYGEN SATURATION: 98 % | TEMPERATURE: 98.7 F | RESPIRATION RATE: 18 BRPM | HEIGHT: 67 IN | SYSTOLIC BLOOD PRESSURE: 139 MMHG

## 2024-06-16 LAB
ANION GAP SERPL CALCULATED.3IONS-SCNC: 9 MMOL/L (ref 7–15)
BACTERIA ABSC ANAEROBE+AEROBE CULT: NORMAL
BUN SERPL-MCNC: 11.1 MG/DL (ref 6–20)
CALCIUM SERPL-MCNC: 8.2 MG/DL (ref 8.6–10)
CHLORIDE SERPL-SCNC: 108 MMOL/L (ref 98–107)
CREAT SERPL-MCNC: 0.68 MG/DL (ref 0.51–0.95)
DEPRECATED HCO3 PLAS-SCNC: 23 MMOL/L (ref 22–29)
EGFRCR SERPLBLD CKD-EPI 2021: >90 ML/MIN/1.73M2
ERYTHROCYTE [DISTWIDTH] IN BLOOD BY AUTOMATED COUNT: 13.5 % (ref 10–15)
GLUCOSE BLDC GLUCOMTR-MCNC: 85 MG/DL (ref 70–99)
GLUCOSE SERPL-MCNC: 83 MG/DL (ref 70–99)
HCT VFR BLD AUTO: 31 % (ref 35–47)
HGB BLD-MCNC: 10.3 G/DL (ref 11.7–15.7)
MAGNESIUM SERPL-MCNC: 2.1 MG/DL (ref 1.7–2.3)
MCH RBC QN AUTO: 30.2 PG (ref 26.5–33)
MCHC RBC AUTO-ENTMCNC: 33.2 G/DL (ref 31.5–36.5)
MCV RBC AUTO: 91 FL (ref 78–100)
PHOSPHATE SERPL-MCNC: 2.4 MG/DL (ref 2.5–4.5)
PLATELET # BLD AUTO: 257 10E3/UL (ref 150–450)
POTASSIUM SERPL-SCNC: 4.1 MMOL/L (ref 3.4–5.3)
RBC # BLD AUTO: 3.41 10E6/UL (ref 3.8–5.2)
SODIUM SERPL-SCNC: 140 MMOL/L (ref 135–145)
WBC # BLD AUTO: 8 10E3/UL (ref 4–11)

## 2024-06-16 PROCEDURE — 250N000013 HC RX MED GY IP 250 OP 250 PS 637

## 2024-06-16 PROCEDURE — 258N000003 HC RX IP 258 OP 636: Mod: JZ | Performed by: HOSPITALIST

## 2024-06-16 PROCEDURE — 80048 BASIC METABOLIC PNL TOTAL CA: CPT | Performed by: HOSPITALIST

## 2024-06-16 PROCEDURE — 250N000013 HC RX MED GY IP 250 OP 250 PS 637: Performed by: HOSPITALIST

## 2024-06-16 PROCEDURE — 36415 COLL VENOUS BLD VENIPUNCTURE: CPT | Performed by: HOSPITALIST

## 2024-06-16 PROCEDURE — 84100 ASSAY OF PHOSPHORUS: CPT | Performed by: HOSPITALIST

## 2024-06-16 PROCEDURE — 99239 HOSP IP/OBS DSCHRG MGMT >30: CPT | Performed by: HOSPITALIST

## 2024-06-16 PROCEDURE — 250N000011 HC RX IP 250 OP 636: Mod: JZ

## 2024-06-16 PROCEDURE — 85048 AUTOMATED LEUKOCYTE COUNT: CPT | Performed by: HOSPITALIST

## 2024-06-16 PROCEDURE — 83735 ASSAY OF MAGNESIUM: CPT | Performed by: HOSPITALIST

## 2024-06-16 RX ORDER — CHLORHEXIDINE GLUCONATE ORAL RINSE 1.2 MG/ML
15 SOLUTION DENTAL 2 TIMES DAILY
Qty: 300 ML | Refills: 0 | Status: SHIPPED | OUTPATIENT
Start: 2024-06-16 | End: 2024-06-26

## 2024-06-16 RX ORDER — ACETAMINOPHEN 500 MG
500-1000 TABLET ORAL EVERY 6 HOURS PRN
Qty: 20 TABLET | Refills: 0 | Status: SHIPPED | OUTPATIENT
Start: 2024-06-16

## 2024-06-16 RX ORDER — HYDROXYZINE HYDROCHLORIDE 25 MG/1
25 TABLET, FILM COATED ORAL ONCE
Status: COMPLETED | OUTPATIENT
Start: 2024-06-16 | End: 2024-06-16

## 2024-06-16 RX ORDER — POLYETHYLENE GLYCOL 3350 17 G/17G
17 POWDER, FOR SOLUTION ORAL DAILY
COMMUNITY
Start: 2024-06-16

## 2024-06-16 RX ORDER — LEVOFLOXACIN 750 MG/1
750 TABLET, FILM COATED ORAL DAILY
Qty: 7 TABLET | Refills: 0 | Status: SHIPPED | OUTPATIENT
Start: 2024-06-16 | End: 2024-06-23

## 2024-06-16 RX ORDER — BISACODYL 10 MG
10 SUPPOSITORY, RECTAL RECTAL DAILY PRN
COMMUNITY
Start: 2024-06-16

## 2024-06-16 RX ORDER — METRONIDAZOLE 500 MG/1
500 TABLET ORAL 3 TIMES DAILY
Qty: 21 TABLET | Refills: 0 | Status: SHIPPED | OUTPATIENT
Start: 2024-06-16 | End: 2024-06-23

## 2024-06-16 RX ORDER — AMOXICILLIN 250 MG
1 CAPSULE ORAL 2 TIMES DAILY PRN
COMMUNITY
Start: 2024-06-16

## 2024-06-16 RX ORDER — IBUPROFEN 600 MG/1
600 TABLET, FILM COATED ORAL EVERY 6 HOURS PRN
Qty: 20 TABLET | Refills: 0 | Status: SHIPPED | OUTPATIENT
Start: 2024-06-16

## 2024-06-16 RX ADMIN — POTASSIUM & SODIUM PHOSPHATES POWDER PACK 280-160-250 MG 1 PACKET: 280-160-250 PACK at 12:00

## 2024-06-16 RX ADMIN — POTASSIUM & SODIUM PHOSPHATES POWDER PACK 280-160-250 MG 2 PACKET: 280-160-250 PACK at 02:08

## 2024-06-16 RX ADMIN — POLYETHYLENE GLYCOL 3350 17 G: 17 POWDER, FOR SOLUTION ORAL at 08:36

## 2024-06-16 RX ADMIN — ACETAMINOPHEN 650 MG: 325 TABLET, FILM COATED ORAL at 07:34

## 2024-06-16 RX ADMIN — MEROPENEM 1 G: 1 INJECTION, POWDER, FOR SOLUTION INTRAVENOUS at 02:08

## 2024-06-16 RX ADMIN — OXYCODONE HYDROCHLORIDE 5 MG: 5 TABLET ORAL at 02:13

## 2024-06-16 RX ADMIN — MEROPENEM 1 G: 1 INJECTION, POWDER, FOR SOLUTION INTRAVENOUS at 09:45

## 2024-06-16 RX ADMIN — HYDROXYZINE HYDROCHLORIDE 25 MG: 25 TABLET, FILM COATED ORAL at 07:34

## 2024-06-16 RX ADMIN — CHLORHEXIDINE GLUCONATE 15 ML: 1.2 SOLUTION ORAL at 08:37

## 2024-06-16 RX ADMIN — POTASSIUM & SODIUM PHOSPHATES POWDER PACK 280-160-250 MG 1 PACKET: 280-160-250 PACK at 09:45

## 2024-06-16 RX ADMIN — IBUPROFEN 600 MG: 600 TABLET ORAL at 06:00

## 2024-06-16 RX ADMIN — SODIUM CHLORIDE, PRESERVATIVE FREE: 5 INJECTION INTRAVENOUS at 00:03

## 2024-06-16 RX ADMIN — IBUPROFEN 600 MG: 600 TABLET ORAL at 00:03

## 2024-06-16 ASSESSMENT — ACTIVITIES OF DAILY LIVING (ADL)
ADLS_ACUITY_SCORE: 32
ADLS_ACUITY_SCORE: 36
ADLS_ACUITY_SCORE: 32

## 2024-06-16 NOTE — DISCHARGE INSTRUCTIONS
Follow up with Dr. Mcfarland in the next 7-10 days. Call [phone] to set up appointment    Ezekiel Mcfarland DDS, MD  [facility]  [address], [address]  [place] [address]  Clinic/On call [phone]    Dr. Pressley's office number will call you to set up follow up.   [phone]

## 2024-06-16 NOTE — PROGRESS NOTES
OMS Progress Note    A/P: Erica Gordon is a 33 year old year old female s/p POD 2 s/p laparoscopic RSO, LS (gyn oncology), I and D of right submandibular abscess with placement of penrose drain and extraction of teeth #31, 32. .   1. Currently progressing well with improving symptoms and exam. Removed drain today and no further intervention from OMFS perspective  2. Plan to transition to levofloxacin and flagyl regimen for 7 days  given anaphylactic reaction to ceftriaxone. Following culures  3. Heat to right mandible on and off. Oral rinses: salt water 4x daily and peridex 2x daily  4. Dental soft diet  5. OMS will continue to follow during admission, but plan for dischrage today    Ezekiel Mcfarland DDS, MD  [facility]  [address], [address]  [place] [address]  Clinic/On call [phone]  Clinic Fax [phone]  [phone]      S: Erica Gordon is a 33 year old year old female s/p laparoscopic RSO, LS (gyn oncology), I and D of right submandibular abscess with placement of penrose drain and extraction of teeth #31, 32.   Pt is comfortably resting in bed with appreciable improvement in pain control and clinical exam. No PONV and tolerating PO intake   Pt threatening to leave AMA but able to keep in hospital to coordinate discharge and removal of drain.     O:   Temp:  [97.7  F (36.5  C)-99.3  F (37.4  C)] 98.7  F (37.1  C)  Pulse:  [61-72] 72  Resp:  [18] 18  BP: (125-161)/(72-93) 139/88  SpO2:  [93 %-99 %] 98 %  Temp (24hrs), Av.5  F (36.9  C), Min:97.7  F (36.5  C), Max:99.3  F (37.4  C)       Intake/Output Summary (Last 24 hours) at 2024 1001  Last data filed at 2024 0003  Gross per 24 hour   Intake 2498 ml   Output 275 ml   Net 2223 ml          Gen: lying in bed, alert, oriented x 3, NAD     E/O  Normocephalic; mild right mandibular edema with penrose drain in place (this was removed at bedside). Less tenderness today. Palpable inferior border and  "angle of mandible..   Neck: Trachea midline, stable/improving right neck edema with penrose drain in place (removed today), cervical lymphandenopathy  TMJ: ROMERO: 40mm, no trismus   I/O  No floor of mouth elevation. No pharyngeal draping. Mallampati III.Extraction sites are hemostatic #31 and 32    Gen: lying in bed, alert, oriented x 3, NAD   Skin: no rash seen   CV: RRR  Lungs: CTAB, no wheezes or crackles   Abd: Did not palpate post op  Ext: bilateral lower edema absent  Neuro: Cranial nerve II-XII grossly intact, motor power V/V all extremities    LABS   Lab Results   Component Value Date    WBC 8.0 06/16/2024    WBC 7.6 06/21/2021     Lab Results   Component Value Date    RBC 3.41 06/16/2024    RBC 4.84 06/21/2021     Lab Results   Component Value Date    HGB 10.3 06/16/2024    HGB 14.3 06/21/2021     Lab Results   Component Value Date    HCT 31.0 06/16/2024    HCT 44.3 06/21/2021     No components found for: \"MCT\"  Lab Results   Component Value Date    MCV 91 06/16/2024    MCV 92 06/21/2021     Lab Results   Component Value Date    MCH 30.2 06/16/2024    MCH 29.5 06/21/2021     Lab Results   Component Value Date    MCHC 33.2 06/16/2024    MCHC 32.3 06/21/2021     Lab Results   Component Value Date    RDW 13.5 06/16/2024    RDW 14.9 06/21/2021     Lab Results   Component Value Date     06/16/2024     06/21/2021       Last Basic Metabolic Panel:  Lab Results   Component Value Date     06/16/2024     08/16/2020      Lab Results   Component Value Date    POTASSIUM 4.1 06/16/2024    POTASSIUM 4.4 08/16/2020     Lab Results   Component Value Date    CHLORIDE 108 06/16/2024    CHLORIDE 108 08/16/2020     Lab Results   Component Value Date    JUAN 8.2 06/16/2024    JUAN 8.5 08/16/2020     Lab Results   Component Value Date    CO2 23 06/16/2024    CO2 25 08/16/2020     Lab Results   Component Value Date    BUN 11.1 06/16/2024    BUN 15 08/16/2020     Lab Results   Component Value Date    CR 0.68 " 06/16/2024    CR 0.77 08/16/2020     Lab Results   Component Value Date    GLC 85 06/16/2024    GLC 83 06/16/2024    GLC 99 08/16/2020

## 2024-06-16 NOTE — DISCHARGE SUMMARY
[facility]    Discharge Summary  Hospitalist    Date of Admission:  6/13/2024  Date of Discharge:  6/16/2024 12:14 PM  Discharging Provider: Maryuri Amaro MD  Date of Service (when I saw the patient): 06/16/24      History of Present Illness   Erica Gordon is a 33 year old female admitted on 6/13/2024 with abdominal pain with large right ovarian cystic mass and right molar abscess     Hospital Course   Erica Gordon was admitted on 6/13/2024.  The following problems were addressed during her hospitalization:    Abdominal pain, complex cystic lesion in the anterior right abdomen per CT  S/p Exp Lap per GYN ONC: RSO, LS, frozen benign, ovarian torsion 6/15/24.   -Gyn Oncology consult; .  Status post exploratory laparotomy with RSO, LS, frozen benign, ovarian torsion. Follow-up on hospital discharge with Dr Funes.   -Postop pain management and surgical management to GYN oncology.  Continues on meropenem, not indicated for ovarian torsion per GYN oncology however also see below regarding dental abscesses and antibiotic management.  -Outpatient follow-up arranged per GYN oncology.     Right submandibular abscess status post I&D 6/14/2024, dental decay status post extraction 2 lower molars on right.  See OMFS notes.  Status post I&D and extraction as above.  Continues on meropenem on discharge recommends levothyroxine and Flagyl 7 days after discharge.  Keep head of bed elevated.  Soft diet.  Salt water and Peridex swish and spit. .  -6/16/2023, tolerating p.o. diet.  Eager to discharge.  OMFS consult, see note 6/16/2023.  Drain removed today, no further intervention; discharged on l Levaquin and Flagyl 7 days; follow-up culture.  Heat to right mandible off and on, oral rinses with salt water and Peridex.  Soft diet.  OMFS will arrange follow-up.     Anxiety, depression: Continue prior to admission fluoxetine      Reactive airway disease: Continue prior to admission albuterol.  No  dyspnea, stridor or wheeze.    Constipation  No associated GI symptoms.  Abdominal exam benign.  Large ovarian cyst preop may have been contributory.  She has moved her bowels.  Recommend OTC bowel regimen as needed, MiraLAX, senna S    Pending Results   These results will be followed up by GYN ONC [Ordering Provider]   Unresulted Labs Ordered in the Past 30 Days of this Admission       Date and Time Order Name Status Description    6/14/2024  7:11 PM Surgical Pathology Exam Preliminary     6/14/2024  6:59 PM Cytology, non-gynecologic In process     6/14/2024  6:18 PM Anaerobic Bacterial Culture Routine Preliminary     6/14/2024  2:53 AM Inhibin A and B, Tumor Marker In process             Code Status   Full Code       Primary Care Physician   EUGENIA MEJIAS    Physical Exam   Temp: 98.7  F (37.1  C) Temp src: Oral BP: 139/88 Pulse: 72   Resp: 18 SpO2: 98 % O2 Device: None (Room air)    Vitals:    06/13/24 1908 06/15/24 0600 06/16/24 0605   Weight: 131.5 kg (290 lb) 127.4 kg (280 lb 13.9 oz) 130.8 kg (288 lb 6.4 oz)     General/Constitutional:   NAD, alert, calm, cooperative, appears stronger  HEENT opens mouth, no further garbled speech.  Swallow mechanism intact..  Area of submandibular right adenopathy decreased in size.  Dressed.    Chest/Respiratory: Respirations nonlabored room air  Cardiovascular:  regular,   Gastrointestinal/Abdomen:  soft, nontender, no rebound, guarding or other peritoneal signs.  Abdominal wound dressed.  Neuro.  Gross motor tested, nonfocal,  Psych oriented, affect calm    Discharge Disposition   Discharged to home  Condition at discharge: Fair    Consultations This Hospital Stay   GYNECOLOGIC ONCOLOGY IP CONSULT  VASCULAR ACCESS ADULT IP CONSULT  VASCULAR ACCESS ADULT IP CONSULT  DENTIST IP CONSULT  HEMATOLOGY & ONCOLOGY IP CONSULT    Time Spent on this Encounter   I, Maryuri Amaro MD, personally saw the patient today and spent greater than 30 minutes discharging this patient  discussing discharge plans with Patient and Nursing, coordinating with Specialties, GYN oncology, all OMFS regarding discharge medications and follow-up.  Completed discharge orders, medications and follow-up.    Discharge Orders      Reason for your hospital stay    Presented with large ovarian cyst and molar [teeth] and neck infection     Activity    Your activity upon discharge: as instructed by Surgery     Follow-up and recommended labs and tests     Follow-up with Gynecology and Oral Maxillary Surgeons;  they will call you for appointment date and time.     Diet    Follow this diet upon discharge:       Advance Diet as Tolerated: Mechanical/Dental Soft Diet     Discharge Medications   Discharge Medication List as of 6/16/2024 11:43 AM        START taking these medications    Details   acetaminophen (TYLENOL) 500 MG tablet Take 1-2 tablets (500-1,000 mg) by mouth every 6 hours as needed for mild pain, Disp-20 tablet, R-0, E-Prescribe      bisacodyl (DULCOLAX) 10 MG suppository Place 1 suppository (10 mg) rectally daily as needed for constipation, OTC      chlorhexidine (PERIDEX) 0.12 % solution Swish and spit 15 mLs in mouth 2 times daily for 10 days, Disp-300 mL, R-0, E-Prescribe      ibuprofen (ADVIL/MOTRIN) 600 MG tablet Take 1 tablet (600 mg) by mouth every 6 hours as needed for moderate pain, Disp-20 tablet, R-0, E-Prescribe      levofloxacin (LEVAQUIN) 750 MG tablet Take 1 tablet (750 mg) by mouth daily for 7 days, Disp-7 tablet, R-0, E-Prescribe      metroNIDAZOLE (FLAGYL) 500 MG tablet Take 1 tablet (500 mg) by mouth 3 times daily for 7 days, Disp-21 tablet, R-0, E-Prescribe      polyethylene glycol (MIRALAX) 17 GM/Dose powder Take 17 g by mouth daily, OTC      senna-docusate (SENOKOT-S/PERICOLACE) 8.6-50 MG tablet Take 1 tablet by mouth 2 times daily as needed for constipation, OTC           CONTINUE these medications which have NOT CHANGED    Details   levonorgestrel (MIRENA) 20 MCG/24HR IUD 1 each (20  mcg) by Intrauterine route continuousNo Print Out      naproxen sodium 220 MG capsule Take 220 mg by mouth 2 times daily as needed (pain), Historical           STOP taking these medications       ibuprofen (ADVIL/MOTRIN) 200 MG capsule Comments:   Reason for Stopping:             Allergies   Allergies   Allergen Reactions    Rocephin [Ceftriaxone Sodium] Anaphylaxis    Latex Hives    Penicillins Hives    Wellbutrin [Bupropion]      Irritability     Data   Most Recent 3 CBC's:  Recent Labs   Lab Test 06/16/24  0528 06/15/24  1531 06/14/24  1027   WBC 8.0 12.5* 14.2*   HGB 10.3* 10.2* 13.6   MCV 91 91 91    278 334      Most Recent 3 BMP's:  Recent Labs   Lab Test 06/16/24  0607 06/16/24  0528 06/15/24  1531 06/14/24  0847   NA  --  140 138 140   POTASSIUM  --  4.1 3.6 5.0   CHLORIDE  --  108* 103 103   CO2  --  23 24 20*   BUN  --  11.1 12.6 15.3   CR  --  0.68 0.73 0.81   ANIONGAP  --  9 11 17*   JUAN  --  8.2* 8.5* 9.8   GLC 85 83 95 83     Most Recent 2 LFT's:  Recent Labs   Lab Test 06/14/24  0847 12/03/18  1335   AST 21 17   ALT 14 26   ALKPHOS 74 52   BILITOTAL 0.6 0.2     Most Recent INR's and Anticoagulation Dosing History:  Anticoagulation Dose History          5/11/2015   Recent Dosing and Labs   INR 1.0          Details          This result is from an external source.             Most Recent 3 Troponin's:  Recent Labs   Lab Test 12/03/18  1335   TROPI <0.015     Most Recent Cholesterol Panel:No lab results found.  Most Recent 6 Bacteria Isolates From Any Culture (See EPIC Reports for Culture Details):  Recent Labs   Lab Test 04/03/18  1232 04/06/16  1619   CULT No beta hemolytic Streptococcus Group A isolated 10,000 to 50,000 colonies/mL mixed urogenital lis     Most Recent TSH, T4 and A1c Labs:No lab results found.

## 2024-06-16 NOTE — PLAN OF CARE
Date & Time: 6/15 1750-4032  Surgery/POD#: POD 2 from a Lap R salpingo-oophorectomy, L salpingectomy, extraction of the #31& 32 teeth, & I&D of R submandibular abscess w/ penrose drain placement.  Behavior & Aggression: Green  Fall Risk: Yes  Orientation: A&Ox4  ABNL VS/O2: VSS  ABNL Labs: Phos 2.4 - replaced with 2/3 doses  Pain Management: Tylenol & atarax  Bowel/Bladder: BM 6/15. Voiding adequately  Drains: Penrose drain with moderate drainage, dressing changed & drain removed by [facility] MD  Wounds/incisions: lap sites WDL  Diet: Tolerating full liquids, refused further advancement to mechanical soft. Stated eating will not be a problem.  Activity Level: Ind  Tests/Procedures: NA  Anticipated  DC Date: Home today. Very anxious to discharge this AM. Educated repetitively about importance of drain removal, discharge px & follow up appointments prior to discharge.  Significant Information: salt water rinses in between Peridex

## 2024-06-16 NOTE — PROGRESS NOTES
Pt is wanting to leave AMA, Dr. Mcfarland was notified and Dr. Mcfarland said that she is not ready to be discharged and that I should contact Dr. Pressley and the hospitalist, as they are the primary in this patient's management. MD paged.    Educated patient on importance of staying and implications of leaving AMA, including no prescription medications, drain still in place, risk for infection, non-payable by insurance.

## 2024-06-16 NOTE — PLAN OF CARE
DATE & TIME: 6/15/24 3pm-11pm    Cognitive Concerns/ Orientation : A&Ox4   BEHAVIOR & AGGRESSION TOOL COLOR: green  ABNL VS/O2: VSS/Room air  MOBILITY: Independent  PAIN MANAGMENT: Oxycodone 10mg prn and scheduled ibuprofen  DIET: clears  BOWEL/BLADDER: continent  ABNL LAB/BG: K+ and phos replaced rechecks in AM, Hgb 10.2  DRAIN/DEVICES: x2 PIV, penrose drain right neck  SKIN: lap sites, right neck   TESTS/PROCEDURES: POD #1 lap R S&O L S, extraction of teeth, I&D of right submandibular abscess with penrose drain  D/C DATE: pending to home

## 2024-06-16 NOTE — PROGRESS NOTES
Brief Gyn Onc Note    Vitals:    06/15/24 0850 06/15/24 0854 06/15/24 1120 06/15/24 1546   BP:   125/72 126/73   BP Location:   Left arm Left arm   Pulse:   66 61   Resp:   18 18   Temp:   98.3  F (36.8  C) 99.3  F (37.4  C)   TempSrc:   Oral Oral   SpO2: 97% 95% 93% 99%   Weight:       Height:         Called patient's nurse to check in. She reports patient dental related pain persists but less severe. She is tolerating CLD without nausea or vomiting, voiding spontaneously, passing flatus but has had no BM.  Vitals are within normal limits. Leukocytosis down trending. From a gyn onc standpoint, patient is meeting post operative goals. Will recommend Lovenox ppx.     Deyanira Harris MD MPH  Obstetric & Gynecology, PGY-2  June 16, 2024 , 12:06 AM

## 2024-06-17 LAB
INHIBIN A SERPL-MCNC: 6.7 PG/ML
INHIBIN B SERPL IA-MCNC: 49 PG/ML

## 2024-06-18 LAB
BACTERIA ABSC ANAEROBE+AEROBE CULT: ABNORMAL

## 2024-06-19 LAB
PATH REPORT.COMMENTS IMP SPEC: NORMAL
PATH REPORT.FINAL DX SPEC: NORMAL
PATH REPORT.FINAL DX SPEC: NORMAL
PATH REPORT.GROSS SPEC: NORMAL
PATH REPORT.GROSS SPEC: NORMAL
PATH REPORT.INTRAOP OBS SPEC DOC: NORMAL
PATH REPORT.MICROSCOPIC SPEC OTHER STN: NORMAL
PATH REPORT.MICROSCOPIC SPEC OTHER STN: NORMAL
PATH REPORT.RELEVANT HX SPEC: NORMAL
PATH REPORT.RELEVANT HX SPEC: NORMAL
PHOTO IMAGE: NORMAL

## 2024-06-19 PROCEDURE — 88112 CYTOPATH CELL ENHANCE TECH: CPT | Mod: 26 | Performed by: PATHOLOGY

## 2024-06-19 PROCEDURE — 88305 TISSUE EXAM BY PATHOLOGIST: CPT | Mod: 26 | Performed by: PATHOLOGY

## 2024-06-20 NOTE — RESULT ENCOUNTER NOTE
Pelvic washing cytology negative for malignancy, Acute inflammation present. Forwarded to Flora Ward for review.

## 2024-06-26 ENCOUNTER — ONCOLOGY VISIT (OUTPATIENT)
Dept: ONCOLOGY | Facility: CLINIC | Age: 34
End: 2024-06-26
Attending: OBSTETRICS & GYNECOLOGY
Payer: MEDICAID

## 2024-06-26 VITALS
DIASTOLIC BLOOD PRESSURE: 82 MMHG | BODY MASS INDEX: 45.04 KG/M2 | RESPIRATION RATE: 16 BRPM | WEIGHT: 287.6 LBS | TEMPERATURE: 98.2 F | OXYGEN SATURATION: 98 % | HEART RATE: 89 BPM | SYSTOLIC BLOOD PRESSURE: 124 MMHG

## 2024-06-26 DIAGNOSIS — D36.9 CYSTADENOMA: Primary | ICD-10-CM

## 2024-06-26 PROCEDURE — G0463 HOSPITAL OUTPT CLINIC VISIT: HCPCS | Performed by: OBSTETRICS & GYNECOLOGY

## 2024-06-26 PROCEDURE — 99024 POSTOP FOLLOW-UP VISIT: CPT | Performed by: OBSTETRICS & GYNECOLOGY

## 2024-06-26 ASSESSMENT — PAIN SCALES - GENERAL: PAINLEVEL: NO PAIN (0)

## 2024-06-26 NOTE — LETTER
6/26/2024      Erica Gordon  [address] [facility] [address]      Dear Colleague,    Thank you for referring your patient, Erica Gordon, to the [facility] [place]. Please see a copy of my visit note below.    Postop Gyn Onc Note     S: Doing well after surgery.   Normal GI/ function.   No bleeding  Still with some oral pain but saw the OMFS surgeon and reports things are healing well.     O:   /82 (BP Location: Left arm, Patient Position: Sitting, Cuff Size: Adult Large)   Pulse 89   Temp 98.2  F (36.8  C) (Oral)   Resp 16   Wt 130.5 kg (287 lb 9.6 oz)   SpO2 98%   BMI 45.04 kg/m     Gen: NAD  Abd: Soft, NT, ND. Incisions c/d/I.   : deferred      Pathology:         Component    Case Report   Surgical Pathology Report                         Case: [id number]                                   Authorizing Provider:  Flora Pressley MD      Collected:           06/14/2024 07:10 PM           Ordering Location:     [facility]          Received:            06/14/2024 07:47 PM                                  [facility] OR                                                             Pathologist:           Renee Rivera MD                                                           Intraop:               Priscila Pastrana MD                                                             Specimens:   A) - Fallopian Tube, Left, left fallopian tube                                                      B) - Ovary and Fallopian Tube, Right, RIGHT FALLOPIAN TUBE AND OVARY                      Final Diagnosis   A.  Fallopian tube, left, salpingectomy:  -Unremarkable fallopian tube with paratubal cysts.     B.  Fallopian tube and ovary, right, salpingo-oophorectomy:  -Ovarian parenchyma with hemorrhagic infarction, consistent with ovarian torsion.  -Ovarian cyst with denuded epithelium with acute inflammation and abscess formation, see comment.   Electronically signed by  Renee Rivera MD on 6/19/2024 at 10:59 AM          A/P:     33 year old  Year old Woman Postop from [facility] RSO with left salpingectom. Doing well. Reviewed benign path. Mouth also healing well    -Followup Gyn Onc PRN    Flora Pressley MD  Gynecologic Oncology  Pager [phone]     Again, thank you for allowing me to participate in the care of your patient.        Sincerely,        Flora Pressley MD

## 2024-06-26 NOTE — LETTER
2024      Erica Gordon  [address] [facility] [address]              To: Whom it May Concern      Ms. Erica Gordon,  1990, had abdominal surgery on .  Ms. Gordon is released to return to work on  with a 15 pound lifting restriction through 2024.  If you need any further information or documentation please contact my clinic directly at ph. [phone] fax [phone].      Sincerely,        Dr. Flora Pressley MD  [facility]  [address]  [place] [address]

## 2024-06-28 NOTE — PROGRESS NOTES
Postop Gyn Onc Note     S: Doing well after surgery.   Normal GI/ function.   No bleeding  Still with some oral pain but saw the OMFS surgeon and reports things are healing well.     O:   /82 (BP Location: Left arm, Patient Position: Sitting, Cuff Size: Adult Large)   Pulse 89   Temp 98.2  F (36.8  C) (Oral)   Resp 16   Wt 130.5 kg (287 lb 9.6 oz)   SpO2 98%   BMI 45.04 kg/m     Gen: NAD  Abd: Soft, NT, ND. Incisions c/d/I.   : deferred      Pathology:         Component    Case Report   Surgical Pathology Report                         Case: [id number]                                   Authorizing Provider:  Flora Pressley MD      Collected:           06/14/2024 07:10 PM           Ordering Location:     [facility]          Received:            06/14/2024 07:47 PM                                  [facility] OR                                                             Pathologist:           Renee Rivera MD                                                           Intraop:               Priscila Pastrana MD                                                             Specimens:   A) - Fallopian Tube, Left, left fallopian tube                                                      B) - Ovary and Fallopian Tube, Right, RIGHT FALLOPIAN TUBE AND OVARY                      Final Diagnosis   A.  Fallopian tube, left, salpingectomy:  -Unremarkable fallopian tube with paratubal cysts.     B.  Fallopian tube and ovary, right, salpingo-oophorectomy:  -Ovarian parenchyma with hemorrhagic infarction, consistent with ovarian torsion.  -Ovarian cyst with denuded epithelium with acute inflammation and abscess formation, see comment.   Electronically signed by Renee Rivera MD on 6/19/2024 at 10:59 AM          A/P:     33 year old  Year old Woman Postop from [facility] with left salpingectom. Doing well. Reviewed benign path. Mouth also healing well    -Followup Gyn Onc PRN    Flora SEYMOUR. Huan  MD  Gynecologic Oncology  Pager [phone]

## 2025-06-15 ENCOUNTER — HEALTH MAINTENANCE LETTER (OUTPATIENT)
Age: 35
End: 2025-06-15

## (undated) DEVICE — SU VICRYL+ 0 27 UR6 VLT VCP603H

## (undated) DEVICE — TAPE UMBILICAL COTTON 30X1/16IN 2 STRAND 8619-03A 8886861903

## (undated) DEVICE — SU DERMABOND ADVANCED .7ML DNX12

## (undated) DEVICE — ESU GROUND PAD E7506

## (undated) DEVICE — ENDO TROCAR FIRST ENTRY KII FIOS Z-THRD 11X100MM CTF33

## (undated) DEVICE — GLOVE BIOGEL PI MICRO SZ 6.5 48565

## (undated) DEVICE — SOL ADH LIQUID BENZOIN SWAB 0.6ML C1544

## (undated) DEVICE — SUCTION IRR STRYKERFLOW II W/TIP 250-070-520

## (undated) DEVICE — DRAIN PENROSE 0.50"X18" LATEX FREE GR203

## (undated) DEVICE — NDL INSUFFLATION 13GA 120MM C2201

## (undated) DEVICE — SU VICRYL 0 CT-1 27" UND J260H

## (undated) DEVICE — KIT CULTURE ESWAB AEROBE/ANAEROBE WHITE TOP R723480

## (undated) DEVICE — SU VICRYL 0 TIE 12X18" J906G

## (undated) DEVICE — SU SILK 2-0 SH 30" K833H

## (undated) DEVICE — CATH TRAY FOLEY SURESTEP 16FR W/URINE MTR STATLK LF A303416A

## (undated) DEVICE — ESU LIGASURE LAPAROSCOPIC BLUNT TIP SEALER 5MMX37CM LF1837

## (undated) DEVICE — SU PDS II 0 CTX 60" Z990G

## (undated) DEVICE — ESU CORD MONOPOLAR 10'  E0510

## (undated) DEVICE — PROTECTOR ARM ONE-STEP TRENDELENBURG 40418

## (undated) DEVICE — WIPES FOLEY CARE SURESTEP PROVON DFC100

## (undated) DEVICE — ESU HOLDER LAP INST DISP PURPLE LONG 330MM H-PRO-330

## (undated) DEVICE — LINEN TOWEL PACK X5 5464

## (undated) DEVICE — SU VICRYL 0 CT-2 27" J334H

## (undated) DEVICE — SU VICRYL 3-0 SH 27" J316H

## (undated) DEVICE — SU VICRYL 3-0 FS-1 27" J442H

## (undated) DEVICE — SU VICRYL 4-0 PS-2 18" UND J496H

## (undated) DEVICE — DECANTER BAG 2002S

## (undated) DEVICE — ENDO TROCAR FIRST ENTRY KII FIOS Z-THRD 05X100MM CTF03

## (undated) DEVICE — BLADE KNIFE SURG 15 371115

## (undated) DEVICE — KIT PATIENT POSITIONING PIGAZZI LATEX FREE 40580

## (undated) DEVICE — Device

## (undated) DEVICE — BLADE CLIPPER SGL USE 9680

## (undated) DEVICE — ESU ENDO SCISSORS 5MM CVD 5DCS

## (undated) DEVICE — SU VICRYL 3-0 CT-1 36" J338H

## (undated) DEVICE — POUCH TISSUE RETRIEVAL 15MM 6.00" INTRO TRS190SB2

## (undated) DEVICE — SU MONOCRYL 4-0 PS-2 27" UND Y426H

## (undated) DEVICE — ENDO TROCAR SLEEVE KII Z-THREADED 05X100MM CTS02

## (undated) DEVICE — SU PDS II 1 TP-1 54" Z879G

## (undated) DEVICE — SU MONOCRYL 4-0 PS-2 18" UND Y496G

## (undated) DEVICE — SOL NACL 0.9% IRRIG 1000ML BOTTLE 2F7124

## (undated) DEVICE — SOL WATER IRRIG 1000ML BOTTLE 2F7114

## (undated) DEVICE — SOL NACL 0.9% INJ 1000ML BAG 2B1324X

## (undated) RX ORDER — LIDOCAINE HCL/EPINEPHRINE/PF 2%-1:200K
VIAL (ML) INJECTION
Status: DISPENSED
Start: 2024-06-14

## (undated) RX ORDER — ONDANSETRON 2 MG/ML
INJECTION INTRAMUSCULAR; INTRAVENOUS
Status: DISPENSED
Start: 2024-06-14

## (undated) RX ORDER — FENTANYL CITRATE 0.05 MG/ML
INJECTION, SOLUTION INTRAMUSCULAR; INTRAVENOUS
Status: DISPENSED
Start: 2024-06-14

## (undated) RX ORDER — FENTANYL CITRATE 50 UG/ML
INJECTION, SOLUTION INTRAMUSCULAR; INTRAVENOUS
Status: DISPENSED
Start: 2024-06-14

## (undated) RX ORDER — CEFAZOLIN SODIUM/WATER 2 G/20 ML
SYRINGE (ML) INTRAVENOUS
Status: DISPENSED
Start: 2024-06-14

## (undated) RX ORDER — BUPIVACAINE HYDROCHLORIDE 2.5 MG/ML
INJECTION, SOLUTION EPIDURAL; INFILTRATION; INTRACAUDAL
Status: DISPENSED
Start: 2024-06-14

## (undated) RX ORDER — HYDROMORPHONE HCL IN WATER/PF 6 MG/30 ML
PATIENT CONTROLLED ANALGESIA SYRINGE INTRAVENOUS
Status: DISPENSED
Start: 2024-06-14

## (undated) RX ORDER — METRONIDAZOLE 500 MG/100ML
INJECTION, SOLUTION INTRAVENOUS
Status: DISPENSED
Start: 2024-06-14